# Patient Record
Sex: MALE | Race: OTHER | NOT HISPANIC OR LATINO | Employment: FULL TIME | URBAN - METROPOLITAN AREA
[De-identification: names, ages, dates, MRNs, and addresses within clinical notes are randomized per-mention and may not be internally consistent; named-entity substitution may affect disease eponyms.]

---

## 2017-02-21 RX ORDER — LOSARTAN POTASSIUM 50 MG/1
TABLET ORAL
Qty: 30 TAB | Refills: 9 | Status: SHIPPED | OUTPATIENT
Start: 2017-02-21 | End: 2017-10-17 | Stop reason: SDUPTHER

## 2017-03-13 DIAGNOSIS — I10 HTN (HYPERTENSION), BENIGN: ICD-10-CM

## 2017-03-13 RX ORDER — HYDROCHLOROTHIAZIDE 25 MG/1
25 TABLET ORAL DAILY
Qty: 30 TAB | Refills: 8 | Status: SHIPPED | OUTPATIENT
Start: 2017-03-13 | End: 2017-10-17 | Stop reason: SDUPTHER

## 2017-09-29 ENCOUNTER — HOSPITAL ENCOUNTER (OUTPATIENT)
Dept: LAB | Facility: MEDICAL CENTER | Age: 47
End: 2017-09-29
Attending: NURSE PRACTITIONER
Payer: COMMERCIAL

## 2017-09-29 DIAGNOSIS — I10 HTN (HYPERTENSION), BENIGN: ICD-10-CM

## 2017-09-29 LAB
ALBUMIN SERPL BCP-MCNC: 4.1 G/DL (ref 3.2–4.9)
ALBUMIN/GLOB SERPL: 1.4 G/DL
ALP SERPL-CCNC: 47 U/L (ref 30–99)
ALT SERPL-CCNC: 43 U/L (ref 2–50)
ANION GAP SERPL CALC-SCNC: 7 MMOL/L (ref 0–11.9)
AST SERPL-CCNC: 26 U/L (ref 12–45)
BILIRUB SERPL-MCNC: 0.4 MG/DL (ref 0.1–1.5)
BUN SERPL-MCNC: 13 MG/DL (ref 8–22)
CALCIUM SERPL-MCNC: 9.1 MG/DL (ref 8.5–10.5)
CHLORIDE SERPL-SCNC: 102 MMOL/L (ref 96–112)
CHOLEST SERPL-MCNC: 128 MG/DL (ref 100–199)
CO2 SERPL-SCNC: 28 MMOL/L (ref 20–33)
CREAT SERPL-MCNC: 1.03 MG/DL (ref 0.5–1.4)
GFR SERPL CREATININE-BSD FRML MDRD: >60 ML/MIN/1.73 M 2
GLOBULIN SER CALC-MCNC: 3 G/DL (ref 1.9–3.5)
GLUCOSE SERPL-MCNC: 84 MG/DL (ref 65–99)
HDLC SERPL-MCNC: 35 MG/DL
LDLC SERPL CALC-MCNC: 61 MG/DL
POTASSIUM SERPL-SCNC: 3.6 MMOL/L (ref 3.6–5.5)
PROT SERPL-MCNC: 7.1 G/DL (ref 6–8.2)
SODIUM SERPL-SCNC: 137 MMOL/L (ref 135–145)
TRIGL SERPL-MCNC: 158 MG/DL (ref 0–149)
TSH SERPL DL<=0.005 MIU/L-ACNC: 1.39 UIU/ML (ref 0.3–3.7)

## 2017-09-29 PROCEDURE — 36415 COLL VENOUS BLD VENIPUNCTURE: CPT

## 2017-09-29 PROCEDURE — 84443 ASSAY THYROID STIM HORMONE: CPT

## 2017-09-29 PROCEDURE — 82652 VIT D 1 25-DIHYDROXY: CPT

## 2017-09-29 PROCEDURE — 80061 LIPID PANEL: CPT

## 2017-09-29 PROCEDURE — 80053 COMPREHEN METABOLIC PANEL: CPT

## 2017-09-30 LAB — 1,25(OH)2D3 SERPL-MCNC: 36.5 PG/ML (ref 19.9–79.3)

## 2017-10-17 ENCOUNTER — OFFICE VISIT (OUTPATIENT)
Dept: MEDICAL GROUP | Facility: MEDICAL CENTER | Age: 47
End: 2017-10-17
Payer: COMMERCIAL

## 2017-10-17 VITALS
DIASTOLIC BLOOD PRESSURE: 78 MMHG | WEIGHT: 269 LBS | BODY MASS INDEX: 42.22 KG/M2 | OXYGEN SATURATION: 95 % | HEART RATE: 90 BPM | TEMPERATURE: 97.8 F | SYSTOLIC BLOOD PRESSURE: 118 MMHG | HEIGHT: 67 IN | RESPIRATION RATE: 16 BRPM

## 2017-10-17 DIAGNOSIS — I10 HTN (HYPERTENSION), BENIGN: ICD-10-CM

## 2017-10-17 DIAGNOSIS — E66.01 MORBID OBESITY WITH BMI OF 40.0-44.9, ADULT (HCC): ICD-10-CM

## 2017-10-17 DIAGNOSIS — Z23 NEED FOR TDAP VACCINATION: ICD-10-CM

## 2017-10-17 PROCEDURE — 90715 TDAP VACCINE 7 YRS/> IM: CPT | Performed by: NURSE PRACTITIONER

## 2017-10-17 PROCEDURE — 90471 IMMUNIZATION ADMIN: CPT | Performed by: NURSE PRACTITIONER

## 2017-10-17 PROCEDURE — 99213 OFFICE O/P EST LOW 20 MIN: CPT | Mod: 25 | Performed by: NURSE PRACTITIONER

## 2017-10-17 RX ORDER — HYDROCHLOROTHIAZIDE 25 MG/1
25 TABLET ORAL DAILY
Qty: 90 TAB | Refills: 3 | Status: SHIPPED | OUTPATIENT
Start: 2017-10-17 | End: 2018-11-02 | Stop reason: SDUPTHER

## 2017-10-17 RX ORDER — LOSARTAN POTASSIUM 50 MG/1
50 TABLET ORAL
Qty: 90 TAB | Refills: 3 | Status: SHIPPED | OUTPATIENT
Start: 2017-10-17 | End: 2018-10-24 | Stop reason: SDUPTHER

## 2017-10-17 RX ORDER — AMLODIPINE BESYLATE 10 MG/1
10 TABLET ORAL DAILY
Qty: 90 TAB | Refills: 3 | Status: SHIPPED | OUTPATIENT
Start: 2017-10-17 | End: 2018-11-02 | Stop reason: SDUPTHER

## 2017-10-17 ASSESSMENT — PATIENT HEALTH QUESTIONNAIRE - PHQ9: CLINICAL INTERPRETATION OF PHQ2 SCORE: 0

## 2017-10-17 NOTE — PROGRESS NOTES
"Subjective:      Jim Neves is a 47 y.o. male who presents with Annual Exam            HPI Jim NevesIs here today for yearly follow-up on hypertension.    Patient reports he feels well and has been taking his amlodipine, HCTZ, and losartan as prescribed. His blood pressure in the office today is normotensive and he believes it is good outside the office as well. His recent blood work also was good and his liver functions were no longer elevated and his potassium was low normal. Kidney functions were good. His cholesterol did show a mildly decreased HDL and mildly increased triglycerides with good LDL and total cholesterol. He admits he does not exercise and his BMI is at 42.        Social History   Substance Use Topics   • Smoking status: Never Smoker   • Smokeless tobacco: Never Used   • Alcohol use Yes      Comment: rare     Current Outpatient Prescriptions   Medication Sig Dispense Refill   • amlodipine (NORVASC) 10 MG Tab Take 1 Tab by mouth every day. 90 Tab 3   • hydrochlorothiazide (HYDRODIURIL) 25 MG Tab Take 1 Tab by mouth every day. 90 Tab 3   • losartan (COZAAR) 50 MG Tab Take 1 Tab by mouth every day. 90 Tab 3   • Cetirizine HCl 10 MG Cap Take  by mouth.     • tacrolimus (PROTOPIC) 0.1 % Ointment By derm 1 Tube 3     No current facility-administered medications for this visit.      Family History   Problem Relation Age of Onset   • Arthritis Mother    • Heart Disease Father    No past medical history on file.    Review of Systems   All other systems reviewed and are negative.         Objective:     /78   Pulse 90   Temp 36.6 °C (97.8 °F)   Resp 16   Ht 1.702 m (5' 7\")   Wt 122 kg (269 lb)   SpO2 95%   BMI 42.13 kg/m²      Physical Exam   Constitutional: He is oriented to person, place, and time. He appears well-developed and well-nourished. No distress.   HENT:   Head: Normocephalic and atraumatic.   Right Ear: External ear normal.   Left Ear: External ear normal.   Nose: Nose " normal.   Mouth/Throat: Oropharynx is clear and moist.   Eyes: Conjunctivae are normal. Right eye exhibits no discharge. Left eye exhibits no discharge.   Neck: Normal range of motion. Neck supple. No tracheal deviation present. No thyromegaly present.   Cardiovascular: Normal rate, regular rhythm and normal heart sounds.    No murmur heard.  Pulmonary/Chest: Effort normal and breath sounds normal. No respiratory distress. He has no wheezes. He has no rales.   Lymphadenopathy:     He has no cervical adenopathy.   Neurological: He is alert and oriented to person, place, and time. Coordination normal.   Skin: Skin is warm and dry. No rash noted. He is not diaphoretic. No erythema.   Psychiatric: He has a normal mood and affect. His behavior is normal. Judgment and thought content normal.   Nursing note and vitals reviewed.              Assessment/Plan:     1. HTN (hypertension), benign  Patient will continue on his 3 antihypertensive medicines and they were refilled today. I advised him the potassium rich foods because his potassium levels are low normal with his HCTZ. I advised him that he needs to lose weight and exercise or he will start having more problems as he ages. He will do his yearly blood work before his next visit.  - amlodipine (NORVASC) 10 MG Tab; Take 1 Tab by mouth every day.  Dispense: 90 Tab; Refill: 3  - hydrochlorothiazide (HYDRODIURIL) 25 MG Tab; Take 1 Tab by mouth every day.  Dispense: 90 Tab; Refill: 3  - COMP METABOLIC PANEL; Future  - LIPID PROFILE; Future  - TSH; Future  - CBC WITHOUT DIFFERENTIAL; Future    2. Need for Tdap vaccination  I have placed the below orders and discussed them with an approved delegating provider. The MA is performing the below orders under the direction of Dr. Salmeron    - Tdap =>8yo IM    3. Morbid obesity with BMI of 40.0-44.9, adult (CMS-[facility])    - Patient identified as having weight management issue.  Appropriate orders and counseling given.

## 2018-10-17 ENCOUNTER — HOSPITAL ENCOUNTER (OUTPATIENT)
Dept: LAB | Facility: MEDICAL CENTER | Age: 48
End: 2018-10-17
Attending: NURSE PRACTITIONER
Payer: COMMERCIAL

## 2018-10-17 DIAGNOSIS — I10 HTN (HYPERTENSION), BENIGN: ICD-10-CM

## 2018-10-17 LAB
ALBUMIN SERPL BCP-MCNC: 4.1 G/DL (ref 3.2–4.9)
ALBUMIN/GLOB SERPL: 1.3 G/DL
ALP SERPL-CCNC: 34 U/L (ref 30–99)
ALT SERPL-CCNC: 119 U/L (ref 2–50)
ANION GAP SERPL CALC-SCNC: 8 MMOL/L (ref 0–11.9)
AST SERPL-CCNC: 316 U/L (ref 12–45)
BILIRUB SERPL-MCNC: 0.6 MG/DL (ref 0.1–1.5)
BUN SERPL-MCNC: 20 MG/DL (ref 8–22)
CALCIUM SERPL-MCNC: 9.2 MG/DL (ref 8.5–10.5)
CHLORIDE SERPL-SCNC: 102 MMOL/L (ref 96–112)
CHOLEST SERPL-MCNC: 152 MG/DL (ref 100–199)
CO2 SERPL-SCNC: 30 MMOL/L (ref 20–33)
CREAT SERPL-MCNC: 1.23 MG/DL (ref 0.5–1.4)
FASTING STATUS PATIENT QL REPORTED: NORMAL
GLOBULIN SER CALC-MCNC: 3.2 G/DL (ref 1.9–3.5)
GLUCOSE SERPL-MCNC: 99 MG/DL (ref 65–99)
HDLC SERPL-MCNC: 37 MG/DL
LDLC SERPL CALC-MCNC: 72 MG/DL
POTASSIUM SERPL-SCNC: 3.8 MMOL/L (ref 3.6–5.5)
PROT SERPL-MCNC: 7.3 G/DL (ref 6–8.2)
SODIUM SERPL-SCNC: 140 MMOL/L (ref 135–145)
TRIGL SERPL-MCNC: 217 MG/DL (ref 0–149)
TSH SERPL DL<=0.005 MIU/L-ACNC: 1.51 UIU/ML (ref 0.38–5.33)

## 2018-10-17 PROCEDURE — 84443 ASSAY THYROID STIM HORMONE: CPT

## 2018-10-17 PROCEDURE — 80061 LIPID PANEL: CPT

## 2018-10-17 PROCEDURE — 80053 COMPREHEN METABOLIC PANEL: CPT

## 2018-10-17 PROCEDURE — 36415 COLL VENOUS BLD VENIPUNCTURE: CPT

## 2018-10-18 DIAGNOSIS — R74.01 ELEVATED TRANSAMINASE LEVEL: ICD-10-CM

## 2018-11-02 DIAGNOSIS — I10 HTN (HYPERTENSION), BENIGN: ICD-10-CM

## 2018-11-02 RX ORDER — AMLODIPINE BESYLATE 10 MG/1
TABLET ORAL
Qty: 30 TAB | Refills: 0 | Status: SHIPPED | OUTPATIENT
Start: 2018-11-02 | End: 2018-11-26 | Stop reason: SDUPTHER

## 2018-11-02 RX ORDER — HYDROCHLOROTHIAZIDE 25 MG/1
TABLET ORAL
Qty: 30 TAB | Refills: 0 | Status: SHIPPED | OUTPATIENT
Start: 2018-11-02 | End: 2018-11-26 | Stop reason: SDUPTHER

## 2018-11-26 DIAGNOSIS — I10 HTN (HYPERTENSION), BENIGN: ICD-10-CM

## 2018-11-26 RX ORDER — LOSARTAN POTASSIUM 50 MG/1
TABLET ORAL
Qty: 30 TAB | Refills: 0 | Status: SHIPPED | OUTPATIENT
Start: 2018-11-26 | End: 2018-12-12 | Stop reason: SDUPTHER

## 2018-11-26 RX ORDER — HYDROCHLOROTHIAZIDE 25 MG/1
25 TABLET ORAL
Qty: 30 TAB | Refills: 0 | Status: SHIPPED | OUTPATIENT
Start: 2018-11-26 | End: 2018-12-12 | Stop reason: SDUPTHER

## 2018-11-26 RX ORDER — AMLODIPINE BESYLATE 10 MG/1
10 TABLET ORAL
Qty: 30 TAB | Refills: 0 | Status: SHIPPED | OUTPATIENT
Start: 2018-11-26 | End: 2018-12-12 | Stop reason: SDUPTHER

## 2018-11-26 RX ORDER — LOSARTAN POTASSIUM 50 MG/1
50 TABLET ORAL
Qty: 30 TAB | Refills: 0 | Status: SHIPPED | OUTPATIENT
Start: 2018-11-26 | End: 2018-12-12

## 2018-12-07 ENCOUNTER — HOSPITAL ENCOUNTER (OUTPATIENT)
Dept: LAB | Facility: MEDICAL CENTER | Age: 48
End: 2018-12-07
Attending: NURSE PRACTITIONER
Payer: COMMERCIAL

## 2018-12-07 DIAGNOSIS — R74.01 ELEVATED TRANSAMINASE LEVEL: ICD-10-CM

## 2018-12-07 PROCEDURE — 80076 HEPATIC FUNCTION PANEL: CPT

## 2018-12-07 PROCEDURE — 36415 COLL VENOUS BLD VENIPUNCTURE: CPT

## 2018-12-07 PROCEDURE — 80074 ACUTE HEPATITIS PANEL: CPT

## 2018-12-08 LAB
ALBUMIN SERPL BCP-MCNC: 4.3 G/DL (ref 3.2–4.9)
ALP SERPL-CCNC: 41 U/L (ref 30–99)
ALT SERPL-CCNC: 43 U/L (ref 2–50)
AST SERPL-CCNC: 25 U/L (ref 12–45)
BILIRUB CONJ SERPL-MCNC: 0.1 MG/DL (ref 0.1–0.5)
BILIRUB INDIRECT SERPL-MCNC: 0.5 MG/DL (ref 0–1)
BILIRUB SERPL-MCNC: 0.6 MG/DL (ref 0.1–1.5)
HAV IGM SERPL QL IA: NEGATIVE
HBV CORE IGM SER QL: NEGATIVE
HBV SURFACE AG SER QL: NEGATIVE
HCV AB SER QL: NEGATIVE
PROT SERPL-MCNC: 7.4 G/DL (ref 6–8.2)

## 2018-12-12 ENCOUNTER — OFFICE VISIT (OUTPATIENT)
Dept: MEDICAL GROUP | Facility: MEDICAL CENTER | Age: 48
End: 2018-12-12
Payer: COMMERCIAL

## 2018-12-12 VITALS
BODY MASS INDEX: 42.22 KG/M2 | WEIGHT: 269 LBS | RESPIRATION RATE: 16 BRPM | HEART RATE: 82 BPM | SYSTOLIC BLOOD PRESSURE: 114 MMHG | HEIGHT: 67 IN | TEMPERATURE: 96.4 F | DIASTOLIC BLOOD PRESSURE: 64 MMHG | OXYGEN SATURATION: 100 %

## 2018-12-12 DIAGNOSIS — Z91.09 ENVIRONMENTAL ALLERGIES: ICD-10-CM

## 2018-12-12 DIAGNOSIS — I10 HTN (HYPERTENSION), BENIGN: ICD-10-CM

## 2018-12-12 DIAGNOSIS — R74.01 ELEVATED TRANSAMINASE LEVEL: ICD-10-CM

## 2018-12-12 DIAGNOSIS — E66.01 MORBID OBESITY WITH BMI OF 40.0-44.9, ADULT (HCC): ICD-10-CM

## 2018-12-12 DIAGNOSIS — Z00.00 ROUTINE GENERAL MEDICAL EXAMINATION AT A HEALTH CARE FACILITY: ICD-10-CM

## 2018-12-12 PROCEDURE — 99214 OFFICE O/P EST MOD 30 MIN: CPT | Performed by: NURSE PRACTITIONER

## 2018-12-12 RX ORDER — FLUTICASONE PROPIONATE 50 MCG
2 SPRAY, SUSPENSION (ML) NASAL DAILY
Qty: 16 G | Refills: 11 | Status: SHIPPED | OUTPATIENT
Start: 2018-12-12 | End: 2018-12-12 | Stop reason: SDUPTHER

## 2018-12-12 RX ORDER — AMLODIPINE BESYLATE 10 MG/1
10 TABLET ORAL
Qty: 90 TAB | Refills: 3 | Status: SHIPPED | OUTPATIENT
Start: 2018-12-12 | End: 2019-03-05 | Stop reason: SDUPTHER

## 2018-12-12 RX ORDER — LOSARTAN POTASSIUM 50 MG/1
50 TABLET ORAL
Qty: 90 TAB | Refills: 3 | Status: SHIPPED | OUTPATIENT
Start: 2018-12-12 | End: 2019-03-05 | Stop reason: SDUPTHER

## 2018-12-12 RX ORDER — HYDROCHLOROTHIAZIDE 25 MG/1
25 TABLET ORAL
Qty: 90 TAB | Refills: 3 | Status: SHIPPED | OUTPATIENT
Start: 2018-12-12 | End: 2019-03-05 | Stop reason: SDUPTHER

## 2018-12-12 RX ORDER — FLUTICASONE PROPIONATE 50 MCG
SPRAY, SUSPENSION (ML) NASAL
Qty: 3 BOTTLE | Refills: 11 | Status: SHIPPED | OUTPATIENT
Start: 2018-12-12 | End: 2020-06-21

## 2018-12-12 ASSESSMENT — ACTIVITIES OF DAILY LIVING (ADL): BATHING_REQUIRES_ASSISTANCE: 0

## 2018-12-12 ASSESSMENT — ENCOUNTER SYMPTOMS: GENERAL WELL-BEING: GOOD

## 2018-12-12 ASSESSMENT — PATIENT HEALTH QUESTIONNAIRE - PHQ9: CLINICAL INTERPRETATION OF PHQ2 SCORE: 0

## 2018-12-12 NOTE — PROGRESS NOTES
Subjective:      Jim Neves is a 48 y.o. male who presents with Annual Exam        CC: Patient here today for yearly follow-up as well as for history of elevated liver enzymes and hypertension and problem with allergies.    HPI Jim Neves        .1. Elevated transaminase level  Patient had previous lab work done in October which showed AST of 316 and ALT of 119.  He was advised to come in for follow-up on this and lab work and imaging was ordered.  He states he does have an appointment for a liver ultrasound still pending but his hepatitis panel came back negative and his hepatic function panel done just recently came back normal.  He does not know why his levels were elevated in October and he states he does not drink alcohol but is morbidly obese.  He states he did do some heavy exercise a few days prior to blood work.  He has no abdominal pain.    2. HTN (hypertension), benign  Patient needs refills on losartan, HCTZ and amlodipine which she has been using for a while for his blood pressure and it is well controlled in the office and he reports no side effects.  GFR and electrolytes have remained normal.    3. Routine general medical examination at a health care facility  Patient will need blood work done before his next visit in 1 year.    4. BMI 40.0-44.9, adult (HCC)  Patient continues to be morbidly obese and states he would be willing to talk with the dietitian for treatment.    5. Morbid obesity with BMI of 40.0-44.9, adult (HCC)  Patient morbidly obese.    6. Environmental allergies  Patient noted to be sniffling during the office visit and states he does tend to have sinus issues at different times of year.  He states his current symptoms have been present for about a month or so.  It is mostly sinus congestion and rhinorrhea.  He has had no pain in his sinuses.  He is treating with over-the-counter Zyrtec.  Current Outpatient Prescriptions   Medication Sig Dispense Refill   • losartan (COZAAR) 50  "MG Tab Take 1 Tab by mouth every day. 90 Tab 3   • hydroCHLOROthiazide (HYDRODIURIL) 25 MG Tab Take 1 Tab by mouth every day. 90 Tab 3   • amLODIPine (NORVASC) 10 MG Tab Take 1 Tab by mouth every day. 90 Tab 3   • fluticasone (FLONASE) 50 MCG/ACT nasal spray Spray 2 Sprays in nose every day. 16 g 11   • Cetirizine HCl 10 MG Cap Take  by mouth.       No current facility-administered medications for this visit.      Social History   Substance Use Topics   • Smoking status: Never Smoker   • Smokeless tobacco: Never Used   • Alcohol use Yes      Comment: rare     Family History   Problem Relation Age of Onset   • Arthritis Mother    • Heart Disease Father    History reviewed. No pertinent past medical history.    Review of Systems   HENT: Positive for congestion.    Endo/Heme/Allergies: Positive for environmental allergies.   All other systems reviewed and are negative.         Objective:     /64   Pulse 82   Temp (!) 35.8 °C (96.4 °F)   Resp 16   Ht 1.702 m (5' 7\")   Wt 122 kg (269 lb)   SpO2 100%   BMI 42.13 kg/m²      Physical Exam   Constitutional: He is oriented to person, place, and time. He appears well-developed and well-nourished. No distress.   HENT:   Head: Normocephalic and atraumatic.   Right Ear: External ear normal.   Left Ear: External ear normal.   Nose: Mucosal edema and rhinorrhea present.   Mouth/Throat: Oropharynx is clear and moist.   Eyes: Conjunctivae are normal. Right eye exhibits no discharge. Left eye exhibits no discharge.   Neck: Normal range of motion. Neck supple. No tracheal deviation present. No thyromegaly present.   Cardiovascular: Normal rate, regular rhythm and normal heart sounds.    No murmur heard.  Pulmonary/Chest: Effort normal and breath sounds normal. No respiratory distress. He has no wheezes. He has no rales.   Lymphadenopathy:     He has no cervical adenopathy.   Neurological: He is alert and oriented to person, place, and time. Coordination normal.   Skin: " Skin is warm and dry. No rash noted. He is not diaphoretic. No erythema.   Psychiatric: He has a normal mood and affect. His behavior is normal. Judgment and thought content normal.   Nursing note and vitals reviewed.              Assessment/Plan:     1. Elevated transaminase level  Patient's liver enzymes have returned to normal and I did recommend he continue his plan to have his ultrasound in case in the future they elevate again.  I advised him to avoid all hepatotoxins to prevent elevation in the future.  Fatty liver may play some part in this.  - COMP METABOLIC PANEL; Future    2. HTN (hypertension), benign  Patient doing well on his 3 medications and I will refill these today and have him do yearly blood work.  - losartan (COZAAR) 50 MG Tab; Take 1 Tab by mouth every day.  Dispense: 90 Tab; Refill: 3  - hydroCHLOROthiazide (HYDRODIURIL) 25 MG Tab; Take 1 Tab by mouth every day.  Dispense: 90 Tab; Refill: 3  - amLODIPine (NORVASC) 10 MG Tab; Take 1 Tab by mouth every day.  Dispense: 90 Tab; Refill: 3  - COMP METABOLIC PANEL; Future  - Lipid Profile; Future  - CBC WITHOUT DIFFERENTIAL; Future    3. Routine general medical examination at a health care facility  Patient will do his lab work before his yearly follow-up.  - COMP METABOLIC PANEL; Future  - Lipid Profile; Future  - CBC WITHOUT DIFFERENTIAL; Future    4. BMI 40.0-44.9, adult (HCC)  Patient will be referred to the weight management program if covered under his insurance.  - Patient identified as having weight management issue.  Appropriate orders and counseling given.  - REFERRAL TO [facility] IMPROVEMENT PROGRAMS (HIP) Services Requested: Weight Management Program; Reason for Visit: Overweight/Obesity; Future    5. Morbid obesity with BMI of 40.0-44.9, adult (HCC)  Patient would like to lose weight since it affects many aspects of his health but he is unsure if it is covered by his insurance.  - Patient identified as having weight management issue.   Appropriate orders and counseling given.  - REFERRAL TO [facility] IMPROVEMENT PROGRAMS (HIP) Services Requested: Weight Management Program; Reason for Visit: Overweight/Obesity; Future    6. Environmental allergies  I advised patient to start using Flonase nasal spray daily with his antihistamine because of the persisting sinus congestion and rhinorrhea despite use of just antihistamine.  - fluticasone (FLONASE) 50 MCG/ACT nasal spray; Spray 2 Sprays in nose every day.  Dispense: 16 g; Refill: 11

## 2018-12-16 ENCOUNTER — HOSPITAL ENCOUNTER (OUTPATIENT)
Dept: RADIOLOGY | Facility: MEDICAL CENTER | Age: 48
End: 2018-12-16
Attending: NURSE PRACTITIONER
Payer: COMMERCIAL

## 2018-12-16 DIAGNOSIS — R74.01 ELEVATED TRANSAMINASE LEVEL: ICD-10-CM

## 2018-12-16 PROCEDURE — 76705 ECHO EXAM OF ABDOMEN: CPT

## 2019-03-05 DIAGNOSIS — I10 HTN (HYPERTENSION), BENIGN: ICD-10-CM

## 2019-03-05 RX ORDER — AMLODIPINE BESYLATE 10 MG/1
10 TABLET ORAL
Qty: 90 TAB | Refills: 3 | Status: SHIPPED | OUTPATIENT
Start: 2019-03-05 | End: 2020-03-02

## 2019-03-05 RX ORDER — LOSARTAN POTASSIUM 50 MG/1
50 TABLET ORAL
Qty: 90 TAB | Refills: 3 | Status: SHIPPED | OUTPATIENT
Start: 2019-03-05 | End: 2020-03-02

## 2019-03-05 RX ORDER — HYDROCHLOROTHIAZIDE 25 MG/1
25 TABLET ORAL
Qty: 90 TAB | Refills: 3 | Status: SHIPPED | OUTPATIENT
Start: 2019-03-05 | End: 2019-06-19

## 2019-03-05 RX ORDER — HYDROCHLOROTHIAZIDE 25 MG/1
25 TABLET ORAL
Qty: 90 TAB | Refills: 3 | Status: SHIPPED | OUTPATIENT
Start: 2019-03-05 | End: 2019-03-05 | Stop reason: SDUPTHER

## 2019-06-19 ENCOUNTER — OFFICE VISIT (OUTPATIENT)
Dept: MEDICAL GROUP | Facility: MEDICAL CENTER | Age: 49
End: 2019-06-19
Payer: COMMERCIAL

## 2019-06-19 ENCOUNTER — HOSPITAL ENCOUNTER (OUTPATIENT)
Dept: LAB | Facility: MEDICAL CENTER | Age: 49
End: 2019-06-19
Attending: NURSE PRACTITIONER
Payer: COMMERCIAL

## 2019-06-19 VITALS
OXYGEN SATURATION: 96 % | BODY MASS INDEX: 40.81 KG/M2 | RESPIRATION RATE: 16 BRPM | HEIGHT: 67 IN | DIASTOLIC BLOOD PRESSURE: 72 MMHG | HEART RATE: 89 BPM | SYSTOLIC BLOOD PRESSURE: 124 MMHG | WEIGHT: 260 LBS

## 2019-06-19 DIAGNOSIS — I10 HTN (HYPERTENSION), BENIGN: ICD-10-CM

## 2019-06-19 DIAGNOSIS — Z87.39 H/O: GOUT: ICD-10-CM

## 2019-06-19 DIAGNOSIS — K76.9 LIVER LESION: ICD-10-CM

## 2019-06-19 DIAGNOSIS — M10.9 ACUTE GOUT INVOLVING TOE OF RIGHT FOOT, UNSPECIFIED CAUSE: ICD-10-CM

## 2019-06-19 DIAGNOSIS — R74.01 ELEVATED TRANSAMINASE LEVEL: ICD-10-CM

## 2019-06-19 LAB — URATE SERPL-MCNC: 8.9 MG/DL (ref 2.5–8.3)

## 2019-06-19 PROCEDURE — 84550 ASSAY OF BLOOD/URIC ACID: CPT

## 2019-06-19 PROCEDURE — 36415 COLL VENOUS BLD VENIPUNCTURE: CPT

## 2019-06-19 PROCEDURE — 99214 OFFICE O/P EST MOD 30 MIN: CPT | Performed by: NURSE PRACTITIONER

## 2019-06-19 RX ORDER — METHYLPREDNISOLONE 4 MG/1
TABLET ORAL
Qty: 21 TAB | Refills: 0 | Status: SHIPPED | OUTPATIENT
Start: 2019-06-19 | End: 2019-09-18

## 2019-06-19 RX ORDER — ALLOPURINOL 100 MG/1
100 TABLET ORAL DAILY
Qty: 30 TAB | Refills: 11 | Status: SHIPPED | OUTPATIENT
Start: 2019-06-19 | End: 2019-06-19 | Stop reason: SDUPTHER

## 2019-06-19 ASSESSMENT — PATIENT HEALTH QUESTIONNAIRE - PHQ9: CLINICAL INTERPRETATION OF PHQ2 SCORE: 0

## 2019-06-19 NOTE — PROGRESS NOTES
Subjective:      Jim Neves is a 48 y.o. male who presents with Gout        CC: Patient is here today mainly for gout concerns but also needs follow-up ultrasound and lab work.    HPI Jim Neves    1. Acute gout involving toe of right foot, unspecified cause  Patient reports he has history of gout which occurs a few times per year.  This is been going on for a while and he does not know what triggers it although he admits that it may be associated with his HCTZ.  He usually affects the joints of his feet on either side.  His current symptoms started about 2 to 3 days ago with swelling and tenderness in the right large toe at the base.  He has been using some over-the-counter medicines for treatment.  He does not know if his uric acid levels have been elevated in the past.    2. Liver lesion  Patient had liver ultrasound done in December because of elevated liver functions and the radiologist did recommend a follow-up ultrasound surveillance because of a small solid target appearing lesion in the right lobe of the liver.  Patient states he would be willing to go back for ultrasound.    3. Elevated transaminase level  Patient's previous lab work from October showed very high AST and ALT with negative testing for hepatitis and liver ultrasound showing fatty liver.  His repeat testing on a hepatic function panel was completely normal and he has not completed his labs ordered after that as of yet.    4. HTN (hypertension), benign  Blood pressure remains well controlled on his HCTZ, amlodipine, losartan combination.      Social History   Substance Use Topics   • Smoking status: Never Smoker   • Smokeless tobacco: Never Used   • Alcohol use Yes      Comment: rare     Current Outpatient Prescriptions   Medication Sig Dispense Refill   • methylPREDNISolone (MEDROL DOSEPAK) 4 MG Tablet Therapy Pack As directed on the packaging label. 21 Tab 0   • metoprolol (LOPRESSOR) 25 MG Tab Take 1 Tab by mouth 2 times a day. 60  "Tab 11   • losartan (COZAAR) 50 MG Tab Take 1 Tab by mouth every day. 90 Tab 3   • amLODIPine (NORVASC) 10 MG Tab Take 1 Tab by mouth every day. 90 Tab 3   • Cetirizine HCl 10 MG Cap Take  by mouth.     • fluticasone (FLONASE) 50 MCG/ACT nasal spray SHAKE LIQUID AND USE 2 SPRAYS IN EACH NOSTRIL EVERY DAY 3 Bottle 11     No current facility-administered medications for this visit.    History reviewed. No pertinent past medical history.   Family History   Problem Relation Age of Onset   • Arthritis Mother    • Heart Disease Father        Review of Systems   Musculoskeletal: Positive for joint pain.   All other systems reviewed and are negative.         Objective:     /72 (BP Location: Right arm, Patient Position: Sitting, BP Cuff Size: Adult)   Pulse 89   Resp 16   Ht 1.702 m (5' 7\")   Wt 117.9 kg (260 lb)   SpO2 96%   BMI 40.72 kg/m²      Physical Exam   Constitutional: He is oriented to person, place, and time. He appears well-developed and well-nourished. No distress.   HENT:   Head: Normocephalic and atraumatic.   Right Ear: External ear normal.   Left Ear: External ear normal.   Nose: Nose normal.   Mouth/Throat: Oropharynx is clear and moist.   Eyes: Conjunctivae are normal. Right eye exhibits no discharge. Left eye exhibits no discharge.   Neck: Normal range of motion. Neck supple. No tracheal deviation present. No thyromegaly present.   Cardiovascular: Normal rate, regular rhythm and normal heart sounds.    No murmur heard.  Pulmonary/Chest: Effort normal and breath sounds normal. No respiratory distress. He has no wheezes. He has no rales.   Musculoskeletal:   Tenderness at the base of the right large toe with no redness or obvious swelling.  Pulses are good and no ulceration present.   Lymphadenopathy:     He has no cervical adenopathy.   Neurological: He is alert and oriented to person, place, and time. Coordination normal.   Skin: Skin is warm and dry. No rash noted. He is not diaphoretic. No " erythema.   Psychiatric: He has a normal mood and affect. His behavior is normal. Judgment and thought content normal.   Nursing note and vitals reviewed.              Assessment/Plan:     1. Acute gout involving toe of right foot, unspecified cause  Patient gives history of gout and symptoms are consistent with possible gout so I will put him on a Medrol Dosepak.  He will go downstairs today to get a uric acid level.  He states he is not drinking alcohol except for rarely now and my concern is that his HCTZ may be making him more prone to this so we agreed to stop the medication and then I will recheck HCTZ again with his follow-up blood work in 2 months.  - URIC ACID; Future  - methylPREDNISolone (MEDROL DOSEPAK) 4 MG Tablet Therapy Pack; As directed on the packaging label.  Dispense: 21 Tab; Refill: 0    2. Liver lesion  Patient to get ultrasound to check this area and we will follow the radiologist advice.  - US-RUQ; Future    3. Elevated transaminase level  Patient reminded to do his lab work ordered in December.  - US-RUQ; Future    4. HTN (hypertension), benign  I am taking patient off HCTZ since he is reporting recurring gout and will try him on metoprolol twice a day instead.  I explained we need to watch his blood pressure and pulse rate on the new medicine.  If he is not getting adequate control I may increase his losartan.  - metoprolol (LOPRESSOR) 25 MG Tab; Take 1 Tab by mouth 2 times a day.  Dispense: 60 Tab; Refill: 11

## 2019-06-20 RX ORDER — ALLOPURINOL 100 MG/1
TABLET ORAL
Qty: 90 TAB | Refills: 3 | Status: SHIPPED | OUTPATIENT
Start: 2019-06-20 | End: 2019-08-01 | Stop reason: SDUPTHER

## 2019-06-27 DIAGNOSIS — Z87.39 H/O: GOUT: ICD-10-CM

## 2019-07-16 ENCOUNTER — HOSPITAL ENCOUNTER (OUTPATIENT)
Dept: RADIOLOGY | Facility: MEDICAL CENTER | Age: 49
End: 2019-07-16
Attending: NURSE PRACTITIONER
Payer: COMMERCIAL

## 2019-07-16 DIAGNOSIS — K76.9 LIVER LESION: ICD-10-CM

## 2019-07-16 DIAGNOSIS — R74.01 ELEVATED TRANSAMINASE LEVEL: ICD-10-CM

## 2019-07-16 PROCEDURE — 76705 ECHO EXAM OF ABDOMEN: CPT

## 2019-08-01 DIAGNOSIS — Z87.39 H/O: GOUT: ICD-10-CM

## 2019-08-01 RX ORDER — ALLOPURINOL 100 MG/1
100 TABLET ORAL
Qty: 90 TAB | Refills: 3 | Status: SHIPPED | OUTPATIENT
Start: 2019-08-01 | End: 2019-08-12 | Stop reason: SDUPTHER

## 2019-08-01 NOTE — TELEPHONE ENCOUNTER
Received fax requesting 90 day supply for allopurinol to [facility] vs local pharmacy (where rx was sent in June 2019). Pended medication with [facility].

## 2019-08-05 DIAGNOSIS — J06.9 UPPER RESPIRATORY TRACT INFECTION, UNSPECIFIED TYPE: ICD-10-CM

## 2019-08-05 RX ORDER — AZITHROMYCIN 250 MG/1
TABLET, FILM COATED ORAL
Qty: 6 TAB | Refills: 1 | Status: SHIPPED | OUTPATIENT
Start: 2019-08-05 | End: 2019-09-18

## 2019-08-12 DIAGNOSIS — Z87.39 H/O: GOUT: ICD-10-CM

## 2019-08-12 RX ORDER — ALLOPURINOL 100 MG/1
300 TABLET ORAL
Qty: 270 TAB | Refills: 3 | Status: SHIPPED | OUTPATIENT
Start: 2019-08-12 | End: 2019-09-10 | Stop reason: SDUPTHER

## 2019-09-10 DIAGNOSIS — Z87.39 H/O: GOUT: ICD-10-CM

## 2019-09-10 RX ORDER — ALLOPURINOL 100 MG/1
300 TABLET ORAL
Qty: 270 TAB | Refills: 3 | Status: SHIPPED | OUTPATIENT
Start: 2019-09-10 | End: 2019-09-12 | Stop reason: SDUPTHER

## 2019-09-12 RX ORDER — ALLOPURINOL 100 MG/1
300 TABLET ORAL
Qty: 270 TAB | Refills: 3 | Status: SHIPPED | OUTPATIENT
Start: 2019-09-12 | End: 2019-09-18 | Stop reason: SDUPTHER

## 2019-09-12 NOTE — TELEPHONE ENCOUNTER
Rx being requested to [facility] vs local pharmacy where it originally went. Med pended with pharmacy update.

## 2019-09-18 DIAGNOSIS — Z87.39 H/O: GOUT: ICD-10-CM

## 2019-09-18 RX ORDER — ALLOPURINOL 100 MG/1
300 TABLET ORAL
Qty: 270 TAB | Refills: 3 | Status: SHIPPED | OUTPATIENT
Start: 2019-09-18 | End: 2019-09-19 | Stop reason: SDUPTHER

## 2019-09-18 RX ORDER — ALLOPURINOL 100 MG/1
300 TABLET ORAL
Qty: 270 TAB | Refills: 3 | Status: SHIPPED | OUTPATIENT
Start: 2019-09-18 | End: 2019-09-18 | Stop reason: SDUPTHER

## 2019-09-18 NOTE — PROGRESS NOTES
90-day prescription for the allopurinol is sent to [facility] as requested by the patient.

## 2019-09-19 RX ORDER — ALLOPURINOL 100 MG/1
300 TABLET ORAL
Qty: 270 TAB | Refills: 3 | Status: ON HOLD | OUTPATIENT
Start: 2019-09-19 | End: 2020-07-01

## 2019-10-08 DIAGNOSIS — I10 HTN (HYPERTENSION), BENIGN: ICD-10-CM

## 2019-11-15 ENCOUNTER — HOSPITAL ENCOUNTER (OUTPATIENT)
Dept: LAB | Facility: MEDICAL CENTER | Age: 49
End: 2019-11-15
Attending: NURSE PRACTITIONER
Payer: COMMERCIAL

## 2019-11-15 DIAGNOSIS — I10 HTN (HYPERTENSION), BENIGN: ICD-10-CM

## 2019-11-15 DIAGNOSIS — M10.9 ACUTE GOUT INVOLVING TOE OF RIGHT FOOT, UNSPECIFIED CAUSE: ICD-10-CM

## 2019-11-15 DIAGNOSIS — Z00.00 ROUTINE GENERAL MEDICAL EXAMINATION AT A HEALTH CARE FACILITY: ICD-10-CM

## 2019-11-15 DIAGNOSIS — R74.01 ELEVATED TRANSAMINASE LEVEL: ICD-10-CM

## 2019-11-15 LAB
ALBUMIN SERPL BCP-MCNC: 4.2 G/DL (ref 3.2–4.9)
ALBUMIN/GLOB SERPL: 1.6 G/DL
ALP SERPL-CCNC: 56 U/L (ref 30–99)
ALT SERPL-CCNC: 35 U/L (ref 2–50)
ANION GAP SERPL CALC-SCNC: 7 MMOL/L (ref 0–11.9)
AST SERPL-CCNC: 20 U/L (ref 12–45)
BILIRUB SERPL-MCNC: 0.4 MG/DL (ref 0.1–1.5)
BUN SERPL-MCNC: 14 MG/DL (ref 8–22)
CALCIUM SERPL-MCNC: 9.1 MG/DL (ref 8.5–10.5)
CHLORIDE SERPL-SCNC: 105 MMOL/L (ref 96–112)
CHOLEST SERPL-MCNC: 139 MG/DL (ref 100–199)
CO2 SERPL-SCNC: 26 MMOL/L (ref 20–33)
CREAT SERPL-MCNC: 1.03 MG/DL (ref 0.5–1.4)
FASTING STATUS PATIENT QL REPORTED: NORMAL
GLOBULIN SER CALC-MCNC: 2.7 G/DL (ref 1.9–3.5)
GLUCOSE SERPL-MCNC: 96 MG/DL (ref 65–99)
HDLC SERPL-MCNC: 34 MG/DL
LDLC SERPL CALC-MCNC: 80 MG/DL
POTASSIUM SERPL-SCNC: 3.9 MMOL/L (ref 3.6–5.5)
PROT SERPL-MCNC: 6.9 G/DL (ref 6–8.2)
SODIUM SERPL-SCNC: 138 MMOL/L (ref 135–145)
TRIGL SERPL-MCNC: 124 MG/DL (ref 0–149)
URATE SERPL-MCNC: 4.7 MG/DL (ref 2.5–8.3)

## 2019-11-15 PROCEDURE — 80061 LIPID PANEL: CPT

## 2019-11-15 PROCEDURE — 36415 COLL VENOUS BLD VENIPUNCTURE: CPT

## 2019-11-15 PROCEDURE — 80053 COMPREHEN METABOLIC PANEL: CPT

## 2019-11-15 PROCEDURE — 84550 ASSAY OF BLOOD/URIC ACID: CPT

## 2019-11-19 ENCOUNTER — OFFICE VISIT (OUTPATIENT)
Dept: MEDICAL GROUP | Facility: MEDICAL CENTER | Age: 49
End: 2019-11-19
Payer: COMMERCIAL

## 2019-11-19 VITALS
OXYGEN SATURATION: 95 % | HEIGHT: 66 IN | SYSTOLIC BLOOD PRESSURE: 118 MMHG | WEIGHT: 266 LBS | DIASTOLIC BLOOD PRESSURE: 74 MMHG | HEART RATE: 80 BPM | BODY MASS INDEX: 42.75 KG/M2 | RESPIRATION RATE: 16 BRPM

## 2019-11-19 DIAGNOSIS — Z00.00 ROUTINE GENERAL MEDICAL EXAMINATION AT A HEALTH CARE FACILITY: ICD-10-CM

## 2019-11-19 DIAGNOSIS — Z87.39 H/O: GOUT: ICD-10-CM

## 2019-11-19 DIAGNOSIS — I10 HTN (HYPERTENSION), BENIGN: ICD-10-CM

## 2019-11-19 PROCEDURE — 99396 PREV VISIT EST AGE 40-64: CPT | Performed by: NURSE PRACTITIONER

## 2019-11-19 NOTE — PROGRESS NOTES
Subjective:      Jim Neves is a 49 y.o. male who presents with Annual Exam (reviw lab results)        CC: Patient here today for annual wellness visit.    HPI       1. 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  He reports no side effects from medicine and recent lab work is come back reassuring with lipid panel showing ASCVD score of only 2.2% and normal chemistry panel and GFR.    2. HTN (hypertension), benign  Blood pressure is controlled but patient admits today that he has been taking his metoprolol short acting only once a day by accident.  He states he does take his losartan and amlodipine correctly.    3. H/O: gout  Patient's previous uric acid levels were up to 8.9 and he states he is taking his 300 mg of allopurinol daily and subsequently his numbers improved to 4.7 and GFR continues to be normal.  History reviewed. No pertinent past medical history.  Social History     Socioeconomic History   • Marital status:      Spouse name: Not on file   • Number of children: Not on file   • Years of education: Not on file   • Highest education level: Not on file   Occupational History   • Not on file   Social Needs   • Financial resource strain: Not on file   • Food insecurity:     Worry: Not on file     Inability: Not on file   • Transportation needs:     Medical: Not on file     Non-medical: Not on file   Tobacco Use   • Smoking status: Never Smoker   • Smokeless tobacco: Never Used   Substance and Sexual Activity   • Alcohol use: Yes     Comment: rare   • Drug use: No   • Sexual activity: Yes     Partners: Female   Lifestyle   • Physical activity:     Days per week: Not on file     Minutes per session: Not on file   • Stress: Not on file   Relationships   • Social connections:     Talks on phone: Not on file     Gets together: Not on file     Attends [employer] service: Not on file     Active member of  "club or organization: Not on file     Attends meetings of clubs or organizations: Not on file     Relationship status: Not on file   • Intimate partner violence:     Fear of current or ex partner: Not on file     Emotionally abused: Not on file     Physically abused: Not on file     Forced sexual activity: Not on file   Other Topics Concern   • Not on file   Social History Narrative   • Not on file     Current Outpatient Medications   Medication Sig Dispense Refill   • metoprolol (LOPRESSOR) 25 MG Tab TAKE 1 TABLET BY MOUTH TWICE DAILY 180 Tab 2   • allopurinol (ZYLOPRIM) 100 MG Tab Take 3 Tabs by mouth every day. 270 Tab 3   • metoprolol (LOPRESSOR) 25 MG Tab TAKE 1 TABLET BY MOUTH TWICE DAILY 180 Tab 3   • losartan (COZAAR) 50 MG Tab Take 1 Tab by mouth every day. 90 Tab 3   • amLODIPine (NORVASC) 10 MG Tab Take 1 Tab by mouth every day. 90 Tab 3   • fluticasone (FLONASE) 50 MCG/ACT nasal spray SHAKE LIQUID AND USE 2 SPRAYS IN EACH NOSTRIL EVERY DAY 3 Bottle 11   • Cetirizine HCl 10 MG Cap Take  by mouth.       No current facility-administered medications for this visit.      Family History   Problem Relation Age of Onset   • Arthritis Mother    • Heart Disease Father          Review of Systems   All other systems reviewed and are negative.         Objective:     /74 (BP Location: Right arm, Patient Position: Sitting, BP Cuff Size: Adult)   Pulse 80   Resp 16   Ht 1.676 m (5' 6\")   Wt 120.7 kg (266 lb)   SpO2 95%   BMI 42.93 kg/m²      Physical Exam  Vitals signs and nursing note reviewed.   Constitutional:       General: He is not in acute distress.     Appearance: He is well-developed. He is not diaphoretic.   HENT:      Head: Normocephalic and atraumatic.      Right Ear: External ear normal.      Left Ear: External ear normal.      Nose: Nose normal.   Eyes:      General:         Right eye: No discharge.         Left eye: No discharge.      Conjunctiva/sclera: Conjunctivae normal.   Neck:      " Musculoskeletal: Normal range of motion and neck supple.      Thyroid: No thyromegaly.      Trachea: No tracheal deviation.   Cardiovascular:      Rate and Rhythm: Normal rate and regular rhythm.      Heart sounds: Normal heart sounds. No murmur.   Pulmonary:      Effort: Pulmonary effort is normal. No respiratory distress.      Breath sounds: Normal breath sounds. No wheezing or rales.   Abdominal:      General: Bowel sounds are normal. There is no distension.      Palpations: Abdomen is soft. There is no mass.      Tenderness: There is no tenderness. There is no guarding or rebound.      Hernia: No hernia is present.   Lymphadenopathy:      Cervical: No cervical adenopathy.   Skin:     General: Skin is warm and dry.      Findings: No erythema or rash.   Neurological:      Mental Status: He is alert and oriented to person, place, and time.      Coordination: Coordination normal.      Deep Tendon Reflexes:      Reflex Scores:       Patellar reflexes are 2+ on the right side and 2+ on the left side.  Psychiatric:         Behavior: Behavior normal.         Thought Content: Thought content normal.         Judgment: Judgment normal.                 Assessment/Plan:       1. Routine general medical examination at a health care facility  Patient doing well today and we reviewed his lab work which came back normal.  I did remind him he still needs to lose weight.  He will do lab work before his next visit  - Comp Metabolic Panel; Future  - URIC ACID; Future  - Lipid Profile; Future  - TSH; Future    2. HTN (hypertension), benign  Blood pressure doing well although I explained his metoprolol is short acting so does need to be used twice a day.  - Comp Metabolic Panel; Future  - URIC ACID; Future  - Lipid Profile; Future  - TSH; Future    3. H/O: gout  Uric acid levels have returned to normal with his allopurinol when he has had no side effects so he will continue this.  - URIC ACID; Future

## 2019-11-21 DIAGNOSIS — I10 HTN (HYPERTENSION), BENIGN: ICD-10-CM

## 2020-02-25 ENCOUNTER — OFFICE VISIT (OUTPATIENT)
Dept: MEDICAL GROUP | Facility: MEDICAL CENTER | Age: 50
End: 2020-02-25
Payer: COMMERCIAL

## 2020-02-25 VITALS
HEART RATE: 76 BPM | TEMPERATURE: 97.6 F | HEIGHT: 66 IN | SYSTOLIC BLOOD PRESSURE: 128 MMHG | WEIGHT: 271 LBS | BODY MASS INDEX: 43.55 KG/M2 | DIASTOLIC BLOOD PRESSURE: 72 MMHG | RESPIRATION RATE: 16 BRPM | OXYGEN SATURATION: 96 %

## 2020-02-25 DIAGNOSIS — M54.50 ACUTE LEFT-SIDED LOW BACK PAIN WITHOUT SCIATICA: ICD-10-CM

## 2020-02-25 PROCEDURE — 99214 OFFICE O/P EST MOD 30 MIN: CPT | Performed by: NURSE PRACTITIONER

## 2020-02-25 RX ORDER — MELOXICAM 15 MG/1
15 TABLET ORAL DAILY
Qty: 14 TAB | Refills: 0 | Status: SHIPPED | OUTPATIENT
Start: 2020-02-25 | End: 2020-02-26 | Stop reason: SDUPTHER

## 2020-02-25 RX ORDER — TIZANIDINE 4 MG/1
4 TABLET ORAL EVERY 6 HOURS PRN
Qty: 30 TAB | Refills: 0 | Status: SHIPPED | OUTPATIENT
Start: 2020-02-25 | End: 2020-06-21

## 2020-02-25 RX ORDER — MELOXICAM 15 MG/1
15 TABLET ORAL DAILY
Qty: 14 TAB | Refills: 0 | Status: SHIPPED | OUTPATIENT
Start: 2020-02-25 | End: 2020-02-25 | Stop reason: SDUPTHER

## 2020-02-25 ASSESSMENT — PATIENT HEALTH QUESTIONNAIRE - PHQ9: CLINICAL INTERPRETATION OF PHQ2 SCORE: 0

## 2020-02-25 ASSESSMENT — ENCOUNTER SYMPTOMS: BACK PAIN: 1

## 2020-02-25 NOTE — PROGRESS NOTES
Subjective:      Jim Neves is a 49 y.o. male who presents with Back Pain (lower back pain)        CC: Patient here today for left lower back pain.    HPI       1. Acute left-sided low back pain without sciatica  Patient comes in today because he is concerned about a possible kidney stone.  He states in 2003 he passed a kidney stone and had much pain at that time.  This time his pain is not as severe but he is still concerned about a possible stone.  He states symptoms started about 6 days ago in the left mid to lower back.  Since then the pain has lessened in severity but is still present.  He states it comes and goes and is worse with sitting to standing and certain movement.  There is no radiation of pain except occasionally slightly lower on the left side of the back but not into the legs.  There is no numbness or tingling.  He has not noticed any hematuria or change in urination.  He has had no abdominal pain.  He denies fever or chills.  History reviewed. No pertinent past medical history.  Social History     Socioeconomic History   • Marital status:      Spouse name: Not on file   • Number of children: Not on file   • Years of education: Not on file   • Highest education level: Not on file   Occupational History   • Not on file   Social Needs   • Financial resource strain: Not on file   • Food insecurity     Worry: Not on file     Inability: Not on file   • Transportation needs     Medical: Not on file     Non-medical: Not on file   Tobacco Use   • Smoking status: Never Smoker   • Smokeless tobacco: Never Used   Substance and Sexual Activity   • Alcohol use: Yes     Comment: rare   • Drug use: No   • Sexual activity: Yes     Partners: Female   Lifestyle   • Physical activity     Days per week: Not on file     Minutes per session: Not on file   • Stress: Not on file   Relationships   • Social connections     Talks on phone: Not on file     Gets together: Not on file     Attends Church service:  "Not on file     Active member of club or organization: Not on file     Attends meetings of clubs or organizations: Not on file     Relationship status: Not on file   • Intimate partner violence     Fear of current or ex partner: Not on file     Emotionally abused: Not on file     Physically abused: Not on file     Forced sexual activity: Not on file   Other Topics Concern   • Not on file   Social History Narrative   • Not on file     Current Outpatient Medications   Medication Sig Dispense Refill   • metoprolol (LOPRESSOR) 25 MG Tab TAKE 1 TABLET BY MOUTH TWICE DAILY 180 Tab 3   • allopurinol (ZYLOPRIM) 100 MG Tab Take 3 Tabs by mouth every day. 270 Tab 3   • losartan (COZAAR) 50 MG Tab Take 1 Tab by mouth every day. 90 Tab 3   • amLODIPine (NORVASC) 10 MG Tab Take 1 Tab by mouth every day. 90 Tab 3   • fluticasone (FLONASE) 50 MCG/ACT nasal spray SHAKE LIQUID AND USE 2 SPRAYS IN EACH NOSTRIL EVERY DAY 3 Bottle 11   • Cetirizine HCl 10 MG Cap Take  by mouth.       No current facility-administered medications for this visit.      Family History   Problem Relation Age of Onset   • Arthritis Mother    • Heart Disease Father          Review of Systems   Musculoskeletal: Positive for back pain.   All other systems reviewed and are negative.         Objective:     /72 (BP Location: Right arm, Patient Position: Sitting, BP Cuff Size: Adult)   Pulse 76   Temp 36.4 °C (97.6 °F) (Temporal)   Resp 16   Ht 1.676 m (5' 6\")   Wt 122.9 kg (271 lb)   SpO2 96%   BMI 43.74 kg/m²      Physical Exam  Vitals signs and nursing note reviewed.   Constitutional:       General: He is not in acute distress.     Appearance: He is well-developed. He is not diaphoretic.   HENT:      Head: Normocephalic and atraumatic.      Right Ear: External ear normal.      Left Ear: External ear normal.      Nose: Nose normal.   Eyes:      General:         Right eye: No discharge.         Left eye: No discharge.      Conjunctiva/sclera: " Conjunctivae normal.   Neck:      Musculoskeletal: Normal range of motion and neck supple.      Thyroid: No thyromegaly.      Trachea: No tracheal deviation.   Cardiovascular:      Rate and Rhythm: Normal rate and regular rhythm.      Heart sounds: Normal heart sounds. No murmur.   Pulmonary:      Effort: Pulmonary effort is normal. No respiratory distress.      Breath sounds: Normal breath sounds. No wheezing or rales.   Musculoskeletal:      Comments: Patient is pointing mostly to his left lumbar area as to where the pain is occurring but there is no CVA tenderness or lower back tenderness.  There is no radiation of pain.  Pain is elicited with bending at the waist and turning side to side.   Lymphadenopathy:      Cervical: No cervical adenopathy.   Skin:     General: Skin is warm and dry.      Findings: No erythema or rash.   Neurological:      Mental Status: He is alert and oriented to person, place, and time.      Coordination: Coordination normal.   Psychiatric:         Behavior: Behavior normal.         Thought Content: Thought content normal.         Judgment: Judgment normal.                 Assessment/Plan:     1. Acute left-sided low back pain without sciatica  Patient unfortunately was unable to provide us a urine sample despite multiple tries.  However, his symptoms sound more suspicious for musculoskeletal cause.  His pain is lower than the left flank and is precipitated with movement and bending.  I will therefore have him start on meloxicam and tizanidine.  I told him not to use the muscle relaxer when he drinks to be alert.  I did however order a CT for renal colic to do if symptoms become more like his previous renal colic symptoms from years ago or does not improve.  He is to keep well-hydrated.  He is to go to the ER should symptoms worsen or develops hematuria or fever.  - POCT Urinalysis  - CT-RENAL COLIC EVALUATION(A/P W/O); Future  - meloxicam (MOBIC) 15 MG tablet; Take 1 Tab by mouth every  day.  Dispense: 14 Tab; Refill: 0  - tizanidine (ZANAFLEX) 4 MG Tab; Take 1 Tab by mouth every 6 hours as needed.  Dispense: 30 Tab; Refill: 0

## 2020-02-26 DIAGNOSIS — M54.50 ACUTE LEFT-SIDED LOW BACK PAIN WITHOUT SCIATICA: ICD-10-CM

## 2020-02-26 RX ORDER — MELOXICAM 15 MG/1
15 TABLET ORAL DAILY
Qty: 14 TAB | Refills: 0 | Status: SHIPPED | OUTPATIENT
Start: 2020-02-26 | End: 2020-03-11 | Stop reason: SDUPTHER

## 2020-02-28 DIAGNOSIS — I10 HTN (HYPERTENSION), BENIGN: ICD-10-CM

## 2020-03-02 RX ORDER — AMLODIPINE BESYLATE 10 MG/1
TABLET ORAL
Qty: 90 TAB | Refills: 4 | Status: SHIPPED | OUTPATIENT
Start: 2020-03-02 | End: 2021-06-08 | Stop reason: SDUPTHER

## 2020-03-02 RX ORDER — LOSARTAN POTASSIUM 50 MG/1
TABLET ORAL
Qty: 90 TAB | Refills: 4 | Status: SHIPPED | OUTPATIENT
Start: 2020-03-02 | End: 2021-06-08 | Stop reason: SDUPTHER

## 2020-03-10 DIAGNOSIS — M54.50 ACUTE LEFT-SIDED LOW BACK PAIN WITHOUT SCIATICA: ICD-10-CM

## 2020-03-11 RX ORDER — MELOXICAM 15 MG/1
15 TABLET ORAL DAILY
Qty: 14 TAB | Refills: 0 | Status: SHIPPED | OUTPATIENT
Start: 2020-03-11 | End: 2020-06-21

## 2020-05-28 RX ORDER — ALBUTEROL SULFATE 90 UG/1
2 AEROSOL, METERED RESPIRATORY (INHALATION) EVERY 6 HOURS PRN
Qty: 8.5 G | Refills: 11 | Status: SHIPPED | OUTPATIENT
Start: 2020-05-28 | End: 2020-11-11

## 2020-06-20 ENCOUNTER — APPOINTMENT (OUTPATIENT)
Dept: RADIOLOGY | Facility: MEDICAL CENTER | Age: 50
DRG: 177 | End: 2020-06-20
Attending: EMERGENCY MEDICINE
Payer: COMMERCIAL

## 2020-06-20 ENCOUNTER — HOSPITAL ENCOUNTER (INPATIENT)
Facility: MEDICAL CENTER | Age: 50
LOS: 10 days | DRG: 177 | End: 2020-07-01
Attending: EMERGENCY MEDICINE | Admitting: HOSPITALIST
Payer: COMMERCIAL

## 2020-06-20 LAB
BASOPHILS # BLD AUTO: 0.3 % (ref 0–1.8)
BASOPHILS # BLD: 0.02 K/UL (ref 0–0.12)
COVID ORDER STATUS COVID19: NORMAL
EOSINOPHIL # BLD AUTO: 0.02 K/UL (ref 0–0.51)
EOSINOPHIL NFR BLD: 0.3 % (ref 0–6.9)
ERYTHROCYTE [DISTWIDTH] IN BLOOD BY AUTOMATED COUNT: 41.6 FL (ref 35.9–50)
HCT VFR BLD AUTO: 46.3 % (ref 42–52)
HGB BLD-MCNC: 15.2 G/DL (ref 14–18)
IMM GRANULOCYTES # BLD AUTO: 0.03 K/UL (ref 0–0.11)
IMM GRANULOCYTES NFR BLD AUTO: 0.5 % (ref 0–0.9)
LACTATE BLD-SCNC: 1.5 MMOL/L (ref 0.5–2)
LYMPHOCYTES # BLD AUTO: 1.52 K/UL (ref 1–4.8)
LYMPHOCYTES NFR BLD: 25.8 % (ref 22–41)
MCH RBC QN AUTO: 27.2 PG (ref 27–33)
MCHC RBC AUTO-ENTMCNC: 32.8 G/DL (ref 33.7–35.3)
MCV RBC AUTO: 83 FL (ref 81.4–97.8)
MONOCYTES # BLD AUTO: 0.5 K/UL (ref 0–0.85)
MONOCYTES NFR BLD AUTO: 8.5 % (ref 0–13.4)
NEUTROPHILS # BLD AUTO: 3.81 K/UL (ref 1.82–7.42)
NEUTROPHILS NFR BLD: 64.6 % (ref 44–72)
NRBC # BLD AUTO: 0 K/UL
NRBC BLD-RTO: 0 /100 WBC
PLATELET # BLD AUTO: 164 K/UL (ref 164–446)
PMV BLD AUTO: 9.4 FL (ref 9–12.9)
RBC # BLD AUTO: 5.58 M/UL (ref 4.7–6.1)
WBC # BLD AUTO: 5.9 K/UL (ref 4.8–10.8)

## 2020-06-20 PROCEDURE — 85652 RBC SED RATE AUTOMATED: CPT

## 2020-06-20 PROCEDURE — 83735 ASSAY OF MAGNESIUM: CPT

## 2020-06-20 PROCEDURE — C9803 HOPD COVID-19 SPEC COLLECT: HCPCS | Performed by: EMERGENCY MEDICINE

## 2020-06-20 PROCEDURE — 83605 ASSAY OF LACTIC ACID: CPT

## 2020-06-20 PROCEDURE — 94760 N-INVAS EAR/PLS OXIMETRY 1: CPT

## 2020-06-20 PROCEDURE — 84484 ASSAY OF TROPONIN QUANT: CPT

## 2020-06-20 PROCEDURE — 82728 ASSAY OF FERRITIN: CPT

## 2020-06-20 PROCEDURE — 93005 ELECTROCARDIOGRAM TRACING: CPT | Performed by: EMERGENCY MEDICINE

## 2020-06-20 PROCEDURE — 85610 PROTHROMBIN TIME: CPT

## 2020-06-20 PROCEDURE — 84100 ASSAY OF PHOSPHORUS: CPT

## 2020-06-20 PROCEDURE — 85025 COMPLETE CBC W/AUTO DIFF WBC: CPT

## 2020-06-20 PROCEDURE — 85730 THROMBOPLASTIN TIME PARTIAL: CPT

## 2020-06-20 PROCEDURE — 84145 PROCALCITONIN (PCT): CPT

## 2020-06-20 PROCEDURE — 85379 FIBRIN DEGRADATION QUANT: CPT

## 2020-06-20 PROCEDURE — 71045 X-RAY EXAM CHEST 1 VIEW: CPT

## 2020-06-20 PROCEDURE — 36415 COLL VENOUS BLD VENIPUNCTURE: CPT

## 2020-06-20 PROCEDURE — 83615 LACTATE (LD) (LDH) ENZYME: CPT

## 2020-06-20 PROCEDURE — 82550 ASSAY OF CK (CPK): CPT

## 2020-06-20 PROCEDURE — 83880 ASSAY OF NATRIURETIC PEPTIDE: CPT

## 2020-06-20 PROCEDURE — 99285 EMERGENCY DEPT VISIT HI MDM: CPT

## 2020-06-20 PROCEDURE — 83520 IMMUNOASSAY QUANT NOS NONAB: CPT

## 2020-06-20 PROCEDURE — 86140 C-REACTIVE PROTEIN: CPT

## 2020-06-20 PROCEDURE — 87040 BLOOD CULTURE FOR BACTERIA: CPT

## 2020-06-20 PROCEDURE — U0004 COV-19 TEST NON-CDC HGH THRU: HCPCS

## 2020-06-20 PROCEDURE — 80053 COMPREHEN METABOLIC PANEL: CPT

## 2020-06-20 RX ORDER — DOXYCYCLINE 100 MG/1
100 TABLET ORAL ONCE
Status: COMPLETED | OUTPATIENT
Start: 2020-06-20 | End: 2020-06-21

## 2020-06-20 RX ORDER — ALBUTEROL SULFATE 90 UG/1
2 AEROSOL, METERED RESPIRATORY (INHALATION) ONCE
Status: COMPLETED | OUTPATIENT
Start: 2020-06-20 | End: 2020-06-20

## 2020-06-20 RX ADMIN — ALBUTEROL SULFATE 2 PUFF: 90 AEROSOL, METERED RESPIRATORY (INHALATION) at 23:45

## 2020-06-20 ASSESSMENT — PAIN SCALES - WONG BAKER: WONGBAKER_NUMERICALRESPONSE: HURTS JUST A LITTLE BIT

## 2020-06-21 PROBLEM — A41.9 SEPSIS (HCC): Status: ACTIVE | Noted: 2020-06-21

## 2020-06-21 PROBLEM — J45.901 REACTIVE AIRWAY DISEASE WITH ACUTE EXACERBATION: Status: ACTIVE | Noted: 2020-06-21

## 2020-06-21 PROBLEM — J96.01 ACUTE HYPOXEMIC RESPIRATORY FAILURE (HCC): Status: ACTIVE | Noted: 2020-06-21

## 2020-06-21 PROBLEM — J12.82 PNEUMONIA DUE TO COVID-19 VIRUS: Status: ACTIVE | Noted: 2020-06-21

## 2020-06-21 PROBLEM — U07.1 PNEUMONIA DUE TO COVID-19 VIRUS: Status: ACTIVE | Noted: 2020-06-21

## 2020-06-21 PROBLEM — R79.89 ABNORMAL LFTS: Status: ACTIVE | Noted: 2020-06-21

## 2020-06-21 PROBLEM — E87.1 HYPONATREMIA: Status: ACTIVE | Noted: 2020-06-21

## 2020-06-21 LAB
ABO GROUP BLD: NORMAL
ALBUMIN SERPL BCP-MCNC: 3.3 G/DL (ref 3.2–4.9)
ALBUMIN SERPL BCP-MCNC: 3.9 G/DL (ref 3.2–4.9)
ALBUMIN/GLOB SERPL: 0.9 G/DL
ALBUMIN/GLOB SERPL: 1.1 G/DL
ALP SERPL-CCNC: 42 U/L (ref 30–99)
ALP SERPL-CCNC: 50 U/L (ref 30–99)
ALT SERPL-CCNC: 47 U/L (ref 2–50)
ALT SERPL-CCNC: 53 U/L (ref 2–50)
ANION GAP SERPL CALC-SCNC: 14 MMOL/L (ref 7–16)
ANION GAP SERPL CALC-SCNC: 16 MMOL/L (ref 7–16)
APTT PPP: 32.3 SEC (ref 24.7–36)
AST SERPL-CCNC: 49 U/L (ref 12–45)
AST SERPL-CCNC: 55 U/L (ref 12–45)
BILIRUB SERPL-MCNC: 0.2 MG/DL (ref 0.1–1.5)
BILIRUB SERPL-MCNC: 0.4 MG/DL (ref 0.1–1.5)
BUN SERPL-MCNC: 17 MG/DL (ref 8–22)
BUN SERPL-MCNC: 20 MG/DL (ref 8–22)
CALCIUM SERPL-MCNC: 8.2 MG/DL (ref 8.5–10.5)
CALCIUM SERPL-MCNC: 8.4 MG/DL (ref 8.5–10.5)
CHLORIDE SERPL-SCNC: 98 MMOL/L (ref 96–112)
CHLORIDE SERPL-SCNC: 99 MMOL/L (ref 96–112)
CK SERPL-CCNC: 556 U/L (ref 0–154)
CO2 SERPL-SCNC: 16 MMOL/L (ref 20–33)
CO2 SERPL-SCNC: 20 MMOL/L (ref 20–33)
CREAT SERPL-MCNC: 0.99 MG/DL (ref 0.5–1.4)
CREAT SERPL-MCNC: 1.26 MG/DL (ref 0.5–1.4)
CRP SERPL HS-MCNC: 4.14 MG/DL (ref 0–0.75)
D DIMER PPP IA.FEU-MCNC: 0.75 UG/ML (FEU) (ref 0–0.5)
EKG IMPRESSION: NORMAL
ERYTHROCYTE [SEDIMENTATION RATE] IN BLOOD BY WESTERGREN METHOD: 20 MM/HOUR (ref 0–15)
FERRITIN SERPL-MCNC: 3152 NG/ML (ref 22–322)
GLOBULIN SER CALC-MCNC: 3.5 G/DL (ref 1.9–3.5)
GLOBULIN SER CALC-MCNC: 3.7 G/DL (ref 1.9–3.5)
GLUCOSE SERPL-MCNC: 109 MG/DL (ref 65–99)
GLUCOSE SERPL-MCNC: 110 MG/DL (ref 65–99)
INR PPP: 0.88 (ref 0.87–1.13)
LACTATE BLD-SCNC: 1.4 MMOL/L (ref 0.5–2)
LACTATE BLD-SCNC: 1.5 MMOL/L (ref 0.5–2)
LDH SERPL L TO P-CCNC: 370 U/L (ref 107–266)
MAGNESIUM SERPL-MCNC: 2 MG/DL (ref 1.5–2.5)
NT-PROBNP SERPL IA-MCNC: 13 PG/ML (ref 0–125)
PHOSPHATE SERPL-MCNC: 4.1 MG/DL (ref 2.5–4.5)
POTASSIUM SERPL-SCNC: 3.9 MMOL/L (ref 3.6–5.5)
POTASSIUM SERPL-SCNC: 4.1 MMOL/L (ref 3.6–5.5)
PROCALCITONIN SERPL-MCNC: 0.41 NG/ML
PROT SERPL-MCNC: 7 G/DL (ref 6–8.2)
PROT SERPL-MCNC: 7.4 G/DL (ref 6–8.2)
PROTHROMBIN TIME: 12.2 SEC (ref 12–14.6)
RH BLD: NORMAL
SARS-COV-2 RNA RESP QL NAA+PROBE: DETECTED
SODIUM SERPL-SCNC: 129 MMOL/L (ref 135–145)
SODIUM SERPL-SCNC: 134 MMOL/L (ref 135–145)
SPECIMEN SOURCE: ABNORMAL
TROPONIN T SERPL-MCNC: 14 NG/L (ref 6–19)

## 2020-06-21 PROCEDURE — 700105 HCHG RX REV CODE 258: Performed by: INTERNAL MEDICINE

## 2020-06-21 PROCEDURE — 80053 COMPREHEN METABOLIC PANEL: CPT

## 2020-06-21 PROCEDURE — 700102 HCHG RX REV CODE 250 W/ 637 OVERRIDE(OP): Performed by: HOSPITALIST

## 2020-06-21 PROCEDURE — 700105 HCHG RX REV CODE 258: Performed by: EMERGENCY MEDICINE

## 2020-06-21 PROCEDURE — A9270 NON-COVERED ITEM OR SERVICE: HCPCS | Performed by: HOSPITALIST

## 2020-06-21 PROCEDURE — 86900 BLOOD TYPING SEROLOGIC ABO: CPT

## 2020-06-21 PROCEDURE — 94640 AIRWAY INHALATION TREATMENT: CPT

## 2020-06-21 PROCEDURE — 700111 HCHG RX REV CODE 636 W/ 250 OVERRIDE (IP): Performed by: EMERGENCY MEDICINE

## 2020-06-21 PROCEDURE — A9270 NON-COVERED ITEM OR SERVICE: HCPCS | Performed by: EMERGENCY MEDICINE

## 2020-06-21 PROCEDURE — 700102 HCHG RX REV CODE 250 W/ 637 OVERRIDE(OP): Performed by: INTERNAL MEDICINE

## 2020-06-21 PROCEDURE — 36430 TRANSFUSION BLD/BLD COMPNT: CPT

## 2020-06-21 PROCEDURE — 86901 BLOOD TYPING SEROLOGIC RH(D): CPT

## 2020-06-21 PROCEDURE — P9017 PLASMA 1 DONOR FRZ W/IN 8 HR: HCPCS

## 2020-06-21 PROCEDURE — 36415 COLL VENOUS BLD VENIPUNCTURE: CPT

## 2020-06-21 PROCEDURE — 770021 HCHG ROOM/CARE - ISO PRIVATE

## 2020-06-21 PROCEDURE — 700105 HCHG RX REV CODE 258: Performed by: HOSPITALIST

## 2020-06-21 PROCEDURE — 99255 IP/OBS CONSLTJ NEW/EST HI 80: CPT | Mod: CS | Performed by: INTERNAL MEDICINE

## 2020-06-21 PROCEDURE — 99223 1ST HOSP IP/OBS HIGH 75: CPT | Mod: CS | Performed by: HOSPITALIST

## 2020-06-21 PROCEDURE — 96365 THER/PROPH/DIAG IV INF INIT: CPT

## 2020-06-21 PROCEDURE — 94760 N-INVAS EAR/PLS OXIMETRY 1: CPT

## 2020-06-21 PROCEDURE — 700111 HCHG RX REV CODE 636 W/ 250 OVERRIDE (IP): Performed by: HOSPITALIST

## 2020-06-21 PROCEDURE — A9270 NON-COVERED ITEM OR SERVICE: HCPCS | Performed by: INTERNAL MEDICINE

## 2020-06-21 PROCEDURE — 83605 ASSAY OF LACTIC ACID: CPT

## 2020-06-21 PROCEDURE — 700102 HCHG RX REV CODE 250 W/ 637 OVERRIDE(OP): Performed by: EMERGENCY MEDICINE

## 2020-06-21 PROCEDURE — 99233 SBSQ HOSP IP/OBS HIGH 50: CPT | Mod: CS | Performed by: HOSPITALIST

## 2020-06-21 RX ORDER — AMLODIPINE BESYLATE 5 MG/1
10 TABLET ORAL
Status: DISCONTINUED | OUTPATIENT
Start: 2020-06-21 | End: 2020-07-01 | Stop reason: HOSPADM

## 2020-06-21 RX ORDER — ONDANSETRON 2 MG/ML
4 INJECTION INTRAMUSCULAR; INTRAVENOUS EVERY 6 HOURS PRN
Status: DISCONTINUED | OUTPATIENT
Start: 2020-06-21 | End: 2020-07-01 | Stop reason: HOSPADM

## 2020-06-21 RX ORDER — LOSARTAN POTASSIUM 50 MG/1
50 TABLET ORAL
Status: DISCONTINUED | OUTPATIENT
Start: 2020-06-21 | End: 2020-07-01 | Stop reason: HOSPADM

## 2020-06-21 RX ORDER — AZITHROMYCIN 500 MG/5ML
500 INJECTION, POWDER, LYOPHILIZED, FOR SOLUTION INTRAVENOUS EVERY 24 HOURS
Status: DISCONTINUED | OUTPATIENT
Start: 2020-06-21 | End: 2020-06-21

## 2020-06-21 RX ORDER — ONDANSETRON 4 MG/1
4 TABLET, ORALLY DISINTEGRATING ORAL EVERY 6 HOURS PRN
Status: DISCONTINUED | OUTPATIENT
Start: 2020-06-21 | End: 2020-07-01 | Stop reason: HOSPADM

## 2020-06-21 RX ORDER — ALBUTEROL SULFATE 90 UG/1
2 AEROSOL, METERED RESPIRATORY (INHALATION)
Status: DISCONTINUED | OUTPATIENT
Start: 2020-06-21 | End: 2020-06-25

## 2020-06-21 RX ORDER — FLUTICASONE PROPIONATE 50 MCG
1 SPRAY, SUSPENSION (ML) NASAL
COMMUNITY

## 2020-06-21 RX ORDER — AZITHROMYCIN 250 MG/1
500 TABLET, FILM COATED ORAL DAILY
Status: COMPLETED | OUTPATIENT
Start: 2020-06-21 | End: 2020-06-23

## 2020-06-21 RX ORDER — SODIUM CHLORIDE 9 MG/ML
INJECTION, SOLUTION INTRAVENOUS
Status: ACTIVE
Start: 2020-06-21 | End: 2020-06-22

## 2020-06-21 RX ORDER — ACETAMINOPHEN 325 MG/1
650 TABLET ORAL EVERY 4 HOURS PRN
Status: DISCONTINUED | OUTPATIENT
Start: 2020-06-21 | End: 2020-07-01 | Stop reason: HOSPADM

## 2020-06-21 RX ADMIN — CEFTRIAXONE SODIUM 2000 MG: 2 INJECTION, POWDER, FOR SOLUTION INTRAMUSCULAR; INTRAVENOUS at 17:20

## 2020-06-21 RX ADMIN — AZITHROMYCIN MONOHYDRATE 500 MG: 250 TABLET ORAL at 02:02

## 2020-06-21 RX ADMIN — DOXYCYCLINE 100 MG: 100 TABLET, FILM COATED ORAL at 00:28

## 2020-06-21 RX ADMIN — REMDESIVIR 200 MG: 5 INJECTION INTRAVENOUS at 12:26

## 2020-06-21 RX ADMIN — ACETAMINOPHEN 650 MG: 325 TABLET, FILM COATED ORAL at 15:54

## 2020-06-21 RX ADMIN — METOPROLOL TARTRATE 25 MG: 25 TABLET, FILM COATED ORAL at 17:20

## 2020-06-21 RX ADMIN — METOPROLOL TARTRATE 25 MG: 25 TABLET, FILM COATED ORAL at 08:06

## 2020-06-21 RX ADMIN — CEFTRIAXONE SODIUM 2 G: 2 INJECTION, POWDER, FOR SOLUTION INTRAMUSCULAR; INTRAVENOUS at 00:31

## 2020-06-21 RX ADMIN — ACETAMINOPHEN 650 MG: 325 TABLET, FILM COATED ORAL at 20:52

## 2020-06-21 RX ADMIN — ACETAMINOPHEN 650 MG: 325 TABLET, FILM COATED ORAL at 03:00

## 2020-06-21 RX ADMIN — ENOXAPARIN SODIUM 40 MG: 100 INJECTION SUBCUTANEOUS at 05:47

## 2020-06-21 RX ADMIN — ALBUTEROL SULFATE 2 PUFF: 90 AEROSOL, METERED RESPIRATORY (INHALATION) at 14:34

## 2020-06-21 RX ADMIN — ALBUTEROL SULFATE 2 PUFF: 90 AEROSOL, METERED RESPIRATORY (INHALATION) at 20:24

## 2020-06-21 ASSESSMENT — ENCOUNTER SYMPTOMS
FLANK PAIN: 0
HEMOPTYSIS: 0
SENSORY CHANGE: 0
SPEECH CHANGE: 0
BRUISES/BLEEDS EASILY: 0
NAUSEA: 0
WEAKNESS: 0
DIZZINESS: 0
CHILLS: 1
DIARRHEA: 0
PALPITATIONS: 0
EYE DISCHARGE: 0
SHORTNESS OF BREATH: 1
DOUBLE VISION: 0
HEARTBURN: 0
MYALGIAS: 0
BLURRED VISION: 0
SPUTUM PRODUCTION: 1
FOCAL WEAKNESS: 0
MYALGIAS: 1
SORE THROAT: 0
HEADACHES: 0
FEVER: 1
VOMITING: 0
HALLUCINATIONS: 0
ABDOMINAL PAIN: 0
FEVER: 0
BACK PAIN: 0
LOSS OF CONSCIOUSNESS: 0
CHILLS: 0
COUGH: 1
DEPRESSION: 0
SPUTUM PRODUCTION: 0

## 2020-06-21 ASSESSMENT — LIFESTYLE VARIABLES
ALCOHOL_USE: YES
TOTAL SCORE: 0
ON A TYPICAL DAY WHEN YOU DRINK ALCOHOL HOW MANY DRINKS DO YOU HAVE: 0
TOTAL SCORE: 0
CONSUMPTION TOTAL: NEGATIVE
HAVE YOU EVER FELT YOU SHOULD CUT DOWN ON YOUR DRINKING: NO
DOES PATIENT WANT TO STOP DRINKING: NO
AVERAGE NUMBER OF DAYS PER WEEK YOU HAVE A DRINK CONTAINING ALCOHOL: 0
EVER HAD A DRINK FIRST THING IN THE MORNING TO STEADY YOUR NERVES TO GET RID OF A HANGOVER: NO
TOTAL SCORE: 0
HOW MANY TIMES IN THE PAST YEAR HAVE YOU HAD 5 OR MORE DRINKS IN A DAY: 0
SUBSTANCE_ABUSE: 0
EVER FELT BAD OR GUILTY ABOUT YOUR DRINKING: NO
HAVE PEOPLE ANNOYED YOU BY CRITICIZING YOUR DRINKING: NO

## 2020-06-21 ASSESSMENT — COGNITIVE AND FUNCTIONAL STATUS - GENERAL
SUGGESTED CMS G CODE MODIFIER MOBILITY: CH
DAILY ACTIVITIY SCORE: 24
SUGGESTED CMS G CODE MODIFIER DAILY ACTIVITY: CH
MOBILITY SCORE: 24

## 2020-06-21 ASSESSMENT — FIBROSIS 4 INDEX: FIB4 SCORE: 2.17

## 2020-06-21 ASSESSMENT — PATIENT HEALTH QUESTIONNAIRE - PHQ9
1. LITTLE INTEREST OR PLEASURE IN DOING THINGS: NOT AT ALL
SUM OF ALL RESPONSES TO PHQ9 QUESTIONS 1 AND 2: 0
SUM OF ALL RESPONSES TO PHQ9 QUESTIONS 1 AND 2: 0
2. FEELING DOWN, DEPRESSED, IRRITABLE, OR HOPELESS: NOT AT ALL
1. LITTLE INTEREST OR PLEASURE IN DOING THINGS: NOT AT ALL
2. FEELING DOWN, DEPRESSED, IRRITABLE, OR HOPELESS: NOT AT ALL

## 2020-06-21 NOTE — PROGRESS NOTES
Monitor summary: SR 90-92, AZ 0.20, QRS 0.08, QT 0.40, with no ectopy per strip from monitor room.

## 2020-06-21 NOTE — ASSESSMENT & PLAN NOTE
DC tele  Wean 02 as tolerated  Cont with empiric IV abx   Follow up covid 19 labs ordered   Blood cx neg  Remdesivir D2  S/p convalescent plasma 6/21  ID following   Clinically is improved    6/23 DDimer>1: increaseing crp, change to full dose anticoagulation    6/24 increasing sob with tachypnea, on 4L face mask sat 90%  ABG 7.41/ PO2 20  pulm consulted, Dr. Bautista, appreciate input  - CT PE ordered    6/25 sob improving, now neutropenic  Improving CRP and Ddimer  -LDH elevated, monitor  On decadron per pulm x 10 days or dc  - repeat convalescent serum, ID to assist  - on Remdesivir    Per pulm, ABG, PO2 reviewed    6/26 monitor CRP, d-dimer and LDH to 48 hours  Respiratory symptoms improving, intermittently requiring 4 to 6 L oxygen supplementation  Clinically improving  Monitor closely  Encourage I-S use  Status post plasma x2, completed Remdesivir on June 25 6/27 decreasing CRP, d-dimer, CPK and LDH  Still requiring 6 L oxygen supplementation, continue prone positioning    6/28 improving, 4L    6/29 improving inflammatory markers, mild leukocytosis today  No new signs of infection  Encourage I-S use  On room air    6/30 leukocytosis, follow-up CBC in a.m.

## 2020-06-21 NOTE — CARE PLAN
Problem: Communication  Goal: The ability to communicate needs accurately and effectively will improve  Outcome: PROGRESSING AS EXPECTED     Problem: Safety  Goal: Will remain free from injury  Outcome: PROGRESSING AS EXPECTED     Problem: Pain Management  Goal: Pain level will decrease to patient's comfort goal  Outcome: PROGRESSING AS EXPECTED     Problem: Respiratory:  Goal: Respiratory status will improve  Outcome: PROGRESSING SLOWER THAN EXPECTED

## 2020-06-21 NOTE — H&P
Hospital Medicine History & Physical Note    Date of Service  6/21/2020    Primary Care Physician  LC Miller    Code Status  Full Code    Chief Complaint  Chief Complaint   Patient presents with   • Shortness of Breath   • Fever     PT reported that after returning from trip to [place] that he has had a fever and SOB without relief from OTC meds.  PT also with Covid test done on 6/15       History of Presenting Illness  49 y.o. male who presented 6/20/2020 with past medical history of hypertension who presents with cough and shortness of breath.  This patient recently had COVID-19 test done on 6/15/20 which was negative.  He does however have sick contacts in his family members.  Who have tested positive for COVID-19.  He has had worsening shortness of breath and cough.  No relief with over-the-counter medications.  Progressively worsening over the last 5 days.  Otherwise no known alleviating or exacerbating factors to his symptoms.  In the emergency department is tested positive for COVID-19 will be admitted to the hospital for further management.    Review of Systems  Review of Systems   Constitutional: Positive for chills, fever and malaise/fatigue.   HENT: Negative for congestion, hearing loss and tinnitus.    Eyes: Negative for blurred vision, double vision and discharge.   Respiratory: Positive for cough, sputum production and shortness of breath. Negative for hemoptysis.    Cardiovascular: Negative for chest pain, palpitations and leg swelling.   Gastrointestinal: Negative for abdominal pain, heartburn, nausea and vomiting.   Genitourinary: Negative for dysuria and flank pain.   Musculoskeletal: Negative for joint pain and myalgias.   Skin: Negative for rash.   Neurological: Negative for dizziness, sensory change, speech change, focal weakness and weakness.   Endo/Heme/Allergies: Negative for environmental allergies. Does not bruise/bleed easily.   Psychiatric/Behavioral: Negative for  depression, hallucinations and substance abuse.       Past Medical History  Obesity, htn    Surgical History  Reviewed and not pertinent    Family History  family history includes Arthritis in his mother; Heart Disease in his father.     Social History   reports that he has never smoked. He has never used smokeless tobacco. He reports current alcohol use. He reports that he does not use drugs.    Allergies  No Known Allergies    Medications  Prior to Admission Medications   Prescriptions Last Dose Informant Patient Reported? Taking?   Cetirizine HCl 10 MG Cap 6/20/2020 at AM Family Member Yes No   Sig: Take 10 mg by mouth every day.   albuterol 108 (90 Base) MCG/ACT Aero Soln inhalation aerosol PRN at PRN Family Member No No   Sig: Inhale 2 Puffs by mouth every 6 hours as needed.   allopurinol (ZYLOPRIM) 100 MG Tab 6/20/2020 at AM Family Member No No   Sig: Take 3 Tabs by mouth every day.   amLODIPine (NORVASC) 10 MG Tab 6/20/2020 at AM Family Member No No   Sig: TAKE 1 TABLET DAILY   fluticasone (FLONASE) 50 MCG/ACT nasal spray PRN at PRN Family Member Yes Yes   Sig: Spray 1 Spray in nose.   losartan (COZAAR) 50 MG Tab 6/20/2020 at AM Family Member No No   Sig: TAKE 1 TABLET DAILY   metoprolol (LOPRESSOR) 25 MG Tab 6/21/2020 at AM Family Member No No   Sig: TAKE 1 TABLET BY MOUTH TWICE DAILY      Facility-Administered Medications: None       Physical Exam  Temp:  [39 °C (102.2 °F)] 39 °C (102.2 °F)  Pulse:  [84] 84  Resp:  [20] 20  BP: (118)/(72) 118/72  SpO2:  [87 %] 87 %    Physical Exam  Vitals signs reviewed.   Constitutional:       General: He is not in acute distress.     Appearance: He is ill-appearing.   HENT:      Head: Normocephalic and atraumatic.      Nose: No congestion.      Mouth/Throat:      Mouth: Mucous membranes are dry.   Eyes:      Extraocular Movements: Extraocular movements intact.      Pupils: Pupils are equal, round, and reactive to light.   Neck:      Musculoskeletal: Neck supple.    Cardiovascular:      Rate and Rhythm: Normal rate and regular rhythm.      Pulses: Normal pulses.      Heart sounds: Normal heart sounds.   Pulmonary:      Effort: Pulmonary effort is normal. No respiratory distress.      Breath sounds: Rhonchi present.   Abdominal:      General: Bowel sounds are normal. There is no distension.      Palpations: Abdomen is soft.      Tenderness: There is no abdominal tenderness.   Musculoskeletal:         General: No swelling.   Skin:     General: Skin is warm and dry.      Capillary Refill: Capillary refill takes 2 to 3 seconds.   Neurological:      General: No focal deficit present.      Mental Status: He is alert and oriented to person, place, and time. Mental status is at baseline.   Psychiatric:         Mood and Affect: Mood normal.         Behavior: Behavior normal.         Thought Content: Thought content normal.         Judgment: Judgment normal.         Laboratory:  Recent Labs     06/20/20  2321   WBC 5.9   RBC 5.58   HEMOGLOBIN 15.2   HEMATOCRIT 46.3   MCV 83.0   MCH 27.2   MCHC 32.8*   RDW 41.6   PLATELETCT 164   MPV 9.4     Recent Labs     06/20/20  2321   SODIUM 134*   POTASSIUM 3.9   CHLORIDE 98   CO2 20   GLUCOSE 110*   BUN 20   CREATININE 1.26   CALCIUM 8.4*     Recent Labs     06/20/20  2321   ALTSGPT 53*   ASTSGOT 55*   ALKPHOSPHAT 50   TBILIRUBIN 0.4   GLUCOSE 110*     Recent Labs     06/20/20  2321   APTT 32.3   INR 0.88     Recent Labs     06/20/20  2321   NTPROBNP 13         Recent Labs     06/20/20  2321   TROPONINT 14       Imaging:  DX-CHEST-PORTABLE (1 VIEW)   Final Result         Patchy bilateral lower lung opacities, atelectasis or multifocal pneumonia.            Assessment/Plan:  Anticipate greater than 2 midnight hospital stay    * Pneumonia due to COVID-19 virus  Assessment & Plan  Admit to telemetry   Wean 02 as tolerated  Cont with empiric IV abx   Follow up covid 19 labs ordered   Prn tylenol ordered  Contact precautions       Abnormal  LFTs  Assessment & Plan  Mild, likely due to covid   Cont ot trend    Hyponatremia  Assessment & Plan  Mild, cont to trend    Acute hypoxemic respiratory failure (HCC)  Assessment & Plan  Wean 02 as tolerated    BMI 40.0-44.9, adult (HCC)- (present on admission)  Assessment & Plan  Encourage weight loss    HTN (hypertension), benign- (present on admission)  Assessment & Plan  Resume home CCB, BB and arb     Ppx: lovenox

## 2020-06-21 NOTE — PROGRESS NOTES
PRIOR TO administering remdesivir, I informed the patient or parent/caregiver of the following information:  ·        I provided them the  “Fact Sheet for Patients and Parents/Caregivers”  ·        I informed them of therapeutic alternatives to remdesivir and the risks and benefits of those alternatives  ·        I informed them that they have the option to accept or refuse remdesivir  ·        I informed them that remdesivir is not FDA approved, but that it is authorized for use under emergency by the FDA  ·        I informed them of the known risks and benefits of remdesivir and discussed the extent to which such risks and benefits are unknown  All information provided and communicated was consistent with the “Fact Sheet for Patients and Parents/Caregivers”.

## 2020-06-21 NOTE — CONSULTS
INFECTIOUS DISEASES INPATIENT CONSULT NOTE     Date of Service: 6/21/2020    Consult Requested By: Martin Mata D.O.    Reason for Consultation: COVID-19 pneumonia    History of Present Illness:   Jim Neves is a 49 y.o. man with a history of seasonal allergies and hypertension admitted 6/20/2020 for worsening shortness of breath and hypoxia.  Patient recently traveled with family to [place] over a week ago.  He was in his usual state of health until approximately 1 week ago when he started to develop fevers and myalgias.  Over the course of this past week, his fevers persisted and he developed a worsening shortness of breath with a nonproductive cough.  His wife purchased a pulse oximeter and he was noted to be satting 88% on room air.  Denies any loss of smell or taste.  His family members who are also in [place] have been ill with similar symptoms and several of his family members have tested positive for COVID-19 and are hospitalized.  Given his worsening symptoms, he presented to the ED for further evaluation and management.  On presentation, he was febrile to 102.2 and a normal white count.  Chest x-ray showed patchy bilateral lower lung opacities.  COVID-19 testing was positive.  He was also found to have an elevated inflammatory markers.  Hypoxic on room air and placed on 4 L nasal cannula.  This morning his oxygen requirements have increased to 5.  He continues to have significant labored breathing exacerbated with minimal exertion.  Patient was started on antibiotics given an elevated procalcitonin.  Infectious disease service consulted for remdesivir and convalescent plasma.      Review of Systems   Constitutional: Positive for fever and malaise/fatigue.   Respiratory: Positive for cough and shortness of breath. Negative for hemoptysis.    Cardiovascular: Negative for chest pain.   Gastrointestinal: Negative for abdominal pain, diarrhea, nausea and vomiting.   Genitourinary:  Negative for dysuria.   Musculoskeletal: Positive for myalgias.   Neurological: Negative for dizziness and headaches.   All other systems reviewed and are negative.      PMH:   Seasonal allergies  Hypertension    PSH:  Lasik surgery    FAMILY HX:  Family History   Problem Relation Age of Onset   • Arthritis Mother    • Heart Disease Father        SOCIAL HX:  Social History     Socioeconomic History   • Marital status:      Spouse name: Not on file   • Number of children: Not on file   • Years of education: Not on file   • Highest education level: Not on file   Occupational History   • Not on file   Social Needs   • Financial resource strain: Not on file   • Food insecurity     Worry: Not on file     Inability: Not on file   • Transportation needs     Medical: Not on file     Non-medical: Not on file   Tobacco Use   • Smoking status: Never Smoker   • Smokeless tobacco: Never Used   Substance and Sexual Activity   • Alcohol use: Yes     Comment: rare   • Drug use: No   • Sexual activity: Yes     Partners: Female   Lifestyle   • Physical activity     Days per week: Not on file     Minutes per session: Not on file   • Stress: Not on file   Relationships   • Social connections     Talks on phone: Not on file     Gets together: Not on file     Attends [employer] service: Not on file     Active member of club or organization: Not on file     Attends meetings of clubs or organizations: Not on file     Relationship status: Not on file   • Intimate partner violence     Fear of current or ex partner: Not on file     Emotionally abused: Not on file     Physically abused: Not on file     Forced sexual activity: Not on file   Other Topics Concern   • Not on file   Social History Narrative   • Not on file     Social History     Tobacco Use   Smoking Status Never Smoker   Smokeless Tobacco Never Used     Social History     Substance and Sexual Activity   Alcohol Use Yes    Comment: rare       Allergies/Intolerances:  No Known  "Allergies    History reviewed with the patient     Other Current Medications:    Current Facility-Administered Medications:   •  ondansetron (ZOFRAN) syringe/vial injection 4 mg, 4 mg, Intravenous, Q6HRS PRN, Murali Ohara M.D.  •  ondansetron (ZOFRAN ODT) dispertab 4 mg, 4 mg, Oral, Q6HRS PRN, Murali Ohara M.D.  •  enoxaparin (LOVENOX) inj 40 mg, 40 mg, Subcutaneous, DAILY, Murali Ohara M.D., 40 mg at 20 0547  •  cefTRIAXone (ROCEPHIN) 2,000 mg in  mL IVPB, 2,000 mg, Intravenous, Q24HRS, Murali Ohara M.D.  •  amLODIPine (NORVASC) tablet 10 mg, 10 mg, Oral, Q DAY, Martin Mata D.O., Stopped at 20 0800  •  losartan (COZAAR) tablet 50 mg, 50 mg, Oral, Q DAY, Martin Mata D.O., Stopped at 20 0800  •  metoprolol (LOPRESSOR) tablet 25 mg, 25 mg, Oral, TWICE DAILY, Martin Mata D.O., 25 mg at 20 0806  •  acetaminophen (TYLENOL) tablet 650 mg, 650 mg, Oral, Q4HRS PRN, Murali Ohara M.D., 650 mg at 20 0300  •  azithromycin (ZITHROMAX) tablet 500 mg, 500 mg, Oral, DAILY, Murali Ohara M.D., 500 mg at 20 0202  •  albuterol inhaler 2 Puff, 2 Puff, Inhalation, Q4HRS (RT), Martin Mata D.O.  [unfilled]    Most Recent Vital Signs:  /67   Pulse 95   Temp 37.6 °C (99.7 °F) (Temporal)   Resp 20   Ht 1.676 m (5' 6\")   Wt 122.2 kg (269 lb 8 oz)   SpO2 90%   BMI 43.50 kg/m²   Temp  Av.7 °C (99.9 °F)  Min: 36.8 °C (98.2 °F)  Max: 39 °C (102.2 °F)    Physical Exam:  General: well nourished, diaphoretic, ill-appearing, no acute distress, obese  HEENT: sclera anicteric, PERRL, extraocular muscles intact, mucous membranes moist, oropharynx clear and moist, no oral lesions or exudate  Neck: supple, no lymphadenopathy  Chest: Coarse breath sounds bilaterally, no rales, rhonchi or wheezes, slightly tachypneic with conversation  Cardiac: Tachycardic rate and rhythm, normal S1 S2, no murmurs, rubs or " "gallops  Abdomen: + bowel sounds, soft, non-tender, non-distended, no hepatosplenomegaly  Extremities: WWP, no edema, 2+ pedal pulses  Skin: warm and dry, no rashes or worrisome lesions  Neuro: Alert and oriented times 3, non-focal exam, speech fluent, full range of motion to bilateral upper and lower extremities  Psych: normal mood and behavior, pleasant; memory intact, normal judgement    Pertinent Lab Results:  Recent Labs     06/20/20 2321   WBC 5.9      Recent Labs     06/20/20 2321   HEMOGLOBIN 15.2   HEMATOCRIT 46.3   MCV 83.0   MCH 27.2   PLATELETCT 164         Recent Labs     06/20/20 2321   SODIUM 134*   POTASSIUM 3.9   CHLORIDE 98   CO2 20   CREATININE 1.26        Recent Labs     06/20/20 2321   ALBUMIN 3.9        Pertinent Micro:  Results     Procedure Component Value Units Date/Time    SARS-CoV-2, PCR (In-House) [[id number]]  (Abnormal) Collected:  06/20/20 2325    Order Status:  Completed Updated:  06/21/20 0041     SARS-CoV-2 Source NP Swab     SARS-CoV-2 by PCR DETECTED     Comment: **The [facility] GeneXpert Xpress SARS-CoV-2 Test has been made available for  use under the Emergency Use Authorization (EUA) only.         Narrative:       Droplet, Contact, and Eye Protection  Rule-out COVID-19 panel, includes droplet/contact/eye  isolation order.  Check influenza to order this test only if  specifically indicated.  Expected Turn around time?->Rush (Cepheid 2-4 hours)    Blood Culture [[id number]] Collected:  06/20/20 2321    Order Status:  Completed Specimen:  Blood from Peripheral Updated:  06/21/20 0010    Narrative:       Droplet, Contact, and Eye Protection  1 of 2 for Blood Culture x 2 sites order. Per Hospital  Policy: Only change Specimen Src: to \"Line\" if specified by  physician order.    Blood Culture [[id number]] Collected:  06/20/20 2321    Order Status:  Completed Specimen:  Blood from Peripheral Updated:  06/21/20 0009    Narrative:       Droplet, Contact, and Eye Protection  2 of 2 blood " "culture x2  Sites order. Per Hospital Policy:  Only change Specimen Src: to \"Line\" if specified by physician  order.    COVID/SARS CoV-2 PCR [[id number]] Collected:  06/20/20 2325    Order Status:  Completed Specimen:  Respirate from Nasopharyngeal Updated:  06/20/20 2333     COVID Order Status Received    Narrative:       Droplet, Contact, and Eye Protection  Rule-out COVID-19 panel, includes droplet/contact/eye  isolation order.  Check influenza to order this test only if  specifically indicated.  Expected Turn around time?->Rush (Cepheid 2-4 hours)        No results found for: BLOODCULTU, BLDCULT, BCHOLD     Studies:  Dx-chest-portable (1 View)    Result Date: 6/20/2020 6/20/2020 11:28 PM HISTORY/REASON FOR EXAM:  Shortness of Breath Fever. Suspect possible Covid-19 exposure rule out TECHNIQUE/EXAM DESCRIPTION AND NUMBER OF VIEWS: Single portable view of the chest. COMPARISON: None FINDINGS: Patchy bilateral lower lung opacities No pleural effusion. No pneumothorax. Normal cardiopericardial silhouette.     Patchy bilateral lower lung opacities, atelectasis or multifocal pneumonia.      IMPRESSION:   1.  Acute hypoxic respiratory failure    2.  COVID-19 pneumonia  3.  Hypertension      PLAN:   Jim Neves is a 49 y.o. man with a history of hypertension admitted for progressive shortness of breath and fevers.  Patient found to be hypoxic with bilateral lung opacities in the setting of positive COVID-19 test.  Patient's oxygen requirements have increased overnight.  He is a candidate for both remdesivir and convalescent plasma.    -Continue supportive care.  Self proning, oxygen supplementation, judicious use of IV fluids  -Discussed extensively with patient regarding remdesivir. Patient agrees and verbal consent obtained..  Will start IV remdesivir 200mg x1, followed by 100 mg daily for a 5-day course.  -Monitor LFTs  -Convalescent plasma discussed extensively with the patient.  Patient signed consent and a copy " given to the patient with an additional copy placed in the patient's chart.  -Agree with empiric 5-day course of antibiotics given elevated procalcitonin.    -Inflammatory markers:  CRP: 4.14  Ferritin:3152  LDH: 370  D-dimer: 0.75  Procalcitonin: 0.41  IL-6: Pending  Will recheck above labs and monitor every 48 hours       Plan of care discussed with DAVID Mata D.O.. Will continue to follow    Yolis Lazo M.D.      Please note that this dictation was created using voice recognition software. I have worked with technical experts from [employer] to optimize the interface.  I have made every reasonable attempt to correct obvious errors, but there may be errors of grammar and possibly content that I did not discover before finalizing the note.  w

## 2020-06-21 NOTE — PROGRESS NOTES
2 person skin check done with JOANNA Harrison.     Devices in place: O2 tubing, telemetry monitoring   Skin intact, no pressure injuries noted.   Wound consult: N/A     Pt up independent and able to turn self, educated on no need to wear mask in room to avoid injury to ears, silicone oxygen tubing in place.

## 2020-06-21 NOTE — ASSESSMENT & PLAN NOTE
Pt states he only has sx's with exercise or with his allergies.  Start scheduled BD therapy  Holding on steroids but will have a low thershold to initiate if any worsening of his COVID disease or worsening of his resp exam

## 2020-06-21 NOTE — PROGRESS NOTES
"Convalescent plasma consent    Jim Neves was consented in person to discuss Jim Neves's ([facility] patient code [id number]) enrollment into \"EAP to Convalescent Plasma for the Treatment of Patient's with COVID-19\", Protocol #20-488853 given. He was found to meet all inclusion and no exclusion criteria for enrollment. Jim Neves consented to participation in the study, signed and dated/timed the informed consent form. I signed and dated/timed the consent form in return and a copy was placed in Jim Neves's chart. All study related procedures (administration of convalescent plasma) will take place after the patient signed the informed consent.     Jim Merinos information was entered into [facility] RedCAP website and [facility] transfusion services were contacted by phone regarding the request who will communicate with [facility] whether a compatible convalescent plasma donation is available in the area for administration. [facility] Research coordinator was included in [facility] enrollment and will coordinate follow-up assessments and  with the submitting PI/Physician in compliance with the protocol procedures.  A signed copy of the consent was provided to the patient as well.     "

## 2020-06-21 NOTE — DIETARY
NUTRITION SERVICES: Pt with morbid obesity as evidenced by BMI >40 (43.5). Weight loss counseling not appropriate in acute care setting. Recommend referral to outpatient nutrition services for weight management after D/C, if pt desires.

## 2020-06-21 NOTE — ED PROVIDER NOTES
ED Provider Note    CHIEF COMPLAINT  Chief Complaint   Patient presents with   • Shortness of Breath   • Fever     PT reported that after returning from trip to [place] that he has had a fever and SOB without relief from OTC meds.  PT also with Covid test done on 6/15       HPI  Jim Neves is a 49 y.o. male who presents to the emergency department chief complaint of worsening shortness of breath.  The patient states that he recently did some traveling with his family and his mother and sister who live in [place] have been positive for COVID his mother was admitted to the hospital few days ago.  He has had upper respiratory symptoms since Sunday's are about 6 days.  He endorses body aches nasal congestion fevers and chills states that fevers are currently getting a little better in the sense when he thought however the shortness of breath is getting worse as well as the cough.  He does not smoke he is not on oxygen at home and states that he thought he was getting some exercise-induced asthma so his primary care provider prescribed him an albuterol but he is never used it.    REVIEW OF SYSTEMS  Positives as above. Pertinent negatives include chest pain nausea vomiting diarrhea abdominal pain flank pain dysuria hematuria easy bleeding or bruising weakness numbness or tingling lower extremity edema rash  All other review of systems are negative    PAST MEDICAL HISTORY       SOCIAL HISTORY  Social History     Tobacco Use   • Smoking status: Never Smoker   • Smokeless tobacco: Never Used   Substance and Sexual Activity   • Alcohol use: Yes     Comment: rare   • Drug use: No   • Sexual activity: Yes     Partners: Female       SURGICAL HISTORY  patient denies any surgical history    CURRENT MEDICATIONS  Home Medications    **Home medications have not yet been reviewed for this encounter**         ALLERGIES  No Known Allergies    PHYSICAL EXAM  VITAL SIGNS: /72   Pulse 84   Temp (!) 39 °C (102.2 °F) (Oral)  Would recommend trying Cymbalta 30 mg daily for follow up in 4 weeks.   "  Resp 20   Ht 1.676 m (5' 6\")   Wt 120.2 kg (265 lb)   SpO2 (!) 87%   BMI 42.77 kg/m²    Pulse ox interpretation: I interpret this pulse ox as normal.  Constitutional: Alert in no apparent distress.  Obese male  HENT: Normocephalic atraumatic, MMM  Eyes: PER, Conjunctiva normal, Non-icteric.   Neck: Normal range of motion, No tenderness, Supple, No stridor.   Cardiovascular: Regular rate and rhythm, no murmurs.   Thorax & Lungs: Diminished breath sounds at the bases with faint wheezing no respiratory distress, No chest tenderness.   Abdomen: Bowel sounds normal, Soft, No tenderness, No pulsatile masses. No peritoneal signs.  Skin: Warm, Dry, No erythema, No rash.   Back: No bony tenderness, No CVA tenderness.   Extremities/MSK: Intact distal pulses, No edema, No tenderness, No cyanosis, no major deformities noted  Neurologic: Alert and oriented x3, No focal deficits noted.       DIFFERENTIAL DIAGNOSIS AND WORK UP PLAN    This is a 49 y.o. male who presents with likely COVID infection he was tested earlier this week as an outpatient but has not received the results as of yet.  We will treat him with handheld albuterol he has been hypoxic for EMS and was on oxygen prior to arrival on my evaluation he satting around 90% on room air without severe respiratory distress.  Will observe him after albuterol will see if he requires oxygen.    DIAGNOSTIC STUDIES / PROCEDURES    EKG  Results for orders placed or performed during the hospital encounter of 20   EKG   Result Value Ref Range    Report       [facility] Emergency Dept.    Test Date:  2020  Pt Name:    LISSY ASTUDILLO               Department: ER  MRN:        [id number]                      Room:       [facility]  Gender:     Male                         Technician: [id number]  :        1970                   Requested By:ER TRIAGE PROTOCOL  Order #:    [id number]                    Reading MD: Celeste Seals, " "MD    Measurements  Intervals                                Axis  Rate:       92                           P:          21  SD:         148                          QRS:        96  QRSD:       92                           T:          46  QT:         356  QTc:        441    Interpretive Statements  Sinus rhythm at a rate of 92 no ST elevations or ST depressions no abnormal T    wave inversions no pathognomonic Q waves normal intervals normal axis  No previous ECG available for comparison  Electronically Signed On 6- 1:05:14 PDT by Celeste Seals MD         LABS  Pertinent Lab Findings  COVID order set sent patient CBC within normal limits, CMP with mildly elevated LFTs ferritin level elevated CPK mildly elevated d-dimer also mildly elevated CRP elevated LDH elevated COVID is detected      RADIOLOGY  DX-CHEST-PORTABLE (1 VIEW)   Final Result         Patchy bilateral lower lung opacities, atelectasis or multifocal pneumonia.        The radiologist's interpretation of all radiological studies have been reviewed by me.      COURSE & MEDICAL DECISION MAKING  Pertinent Labs & Imaging studies reviewed. (See chart for details)    12:01 AM  I reassessed patient at the bedside he is requiring nasal cannula oxygenation secondary to desaturation, he is not in severe respiratory distress but understands that he will be hospitalized.  His COVID order set is still resulting.  Secondary to concern for sepsis he was treated with IV antibiotics    I spoke with Dr. Ohara with the hospitalist service and he is accepted the patient    /86   Pulse 84   Temp 36.8 °C (98.2 °F) (Temporal)   Resp 18   Ht 1.676 m (5' 6\")   Wt 120.2 kg (265 lb)   SpO2 96%   BMI 42.77 kg/m²       I verified that the patient was wearing a mask and I was wearing appropriate PPE every time I entered the room. The patient's mask was on the patient at all times during my encounter except for a brief view of the " oropharynx.    DISPOSITION:  Patient will be evaluated for hospitalization by Dr Ohara in guarded condition.        FINAL IMPRESSION  1. COVID 19 pneumonia  2. Hypoxia      Electronically signed by: Celeste Seals M.D., 6/20/2020 11:24 PM    This dictation has been created using voice recognition software and/or scribes. The accuracy of the dictation is limited by the abilities of the software and the expertise of the scribes. I expect there may be some errors of grammar and possibly content. I made every attempt to manually correct the errors within my dictation. However, errors related to voice recognition software and/or scribes may still exist and should be interpreted within the appropriate context.

## 2020-06-21 NOTE — ED TRIAGE NOTES
"Chief Complaint   Patient presents with   • Shortness of Breath   • Fever     PT reported that after returning from trip to [place] that he has had a fever and SOB without relief from OTC meds.  PT also with Covid test done on 6/15   Blood Pressure 118/72   Pulse 84   Temperature (Abnormal) 39 °C (102.2 °F) (Oral)   Respiration 20   Height 1.676 m (5' 6\")   Weight 120.2 kg (265 lb)   Oxygen Saturation (Abnormal) 87%   Body Mass Index 42.77 kg/m²     "

## 2020-06-21 NOTE — PROGRESS NOTES
Assumed care of patient at 0130. Report received from ED RN and pt transported to the floor on tele monitor. A/O x4. Educated on room, oxygen, call light use, fall risk, and plan of care. Bed locked and low, pt ambulating independently. Needs addressed.

## 2020-06-21 NOTE — ASSESSMENT & PLAN NOTE
Mild, likely due to covid   Cont to trend    6/27 increasing LFT, monitor closely    6/28 increasing LFT, monitor  S/p RDV    6/29 improving LFT  We will monitor every 48 hours    6/30 improving, monitor

## 2020-06-21 NOTE — PROGRESS NOTES
[facility] Medicine Daily Progress Note    Date of Service  6/21/2020    Chief Complaint  49 y.o. male admitted 6/20/2020 with fever and SOB    Hospital Course    PMHx HTN.  Presenting with cough and SOB.  outpt COVID neg on 6/15.  He does however have known + exposure to family members.  Sx's developed and worsened over 5 days. In ED temp 102 and 87% on RA.   CXR showing B patchy infiltrates.  Admitted on C3/Zithro for community acquired pneumonia and acute hypoxic resp failure      Interval Problem Update  O2 demand 5 LNC, down from 6 LNC on admission  Pt states he feels the same, still very SOB especially when he tries to walk.  cont's to have body aches and HA.  No N or V.      Consultants/Specialty      Code Status  full    Disposition  Cont in house    Review of Systems  Review of Systems   Constitutional: Positive for malaise/fatigue. Negative for chills and fever.   HENT: Negative for nosebleeds and sore throat.    Eyes: Negative for blurred vision and double vision.   Respiratory: Positive for cough and shortness of breath. Negative for sputum production.    Cardiovascular: Negative for chest pain, palpitations and leg swelling.   Gastrointestinal: Negative for abdominal pain, diarrhea, nausea and vomiting.   Genitourinary: Negative for dysuria and urgency.   Musculoskeletal: Positive for myalgias. Negative for back pain.   Skin: Negative for rash.   Neurological: Negative for dizziness, loss of consciousness and headaches.        Physical Exam  Temp:  [36.8 °C (98.2 °F)-39 °C (102.2 °F)] 37.9 °C (100.3 °F)  Pulse:  [84-91] 91  Resp:  [18-20] 18  BP: (100-118)/(59-86) 100/59  SpO2:  [87 %-96 %] 91 %    Physical Exam  Vitals signs reviewed.   Constitutional:       General: He is not in acute distress.     Appearance: He is well-developed. He is not diaphoretic.   HENT:      Head: Normocephalic and atraumatic.   Eyes:      Conjunctiva/sclera: Conjunctivae normal.   Neck:      Vascular: No JVD.   Cardiovascular:       Rate and Rhythm: Normal rate.      Heart sounds: No murmur. No gallop.    Pulmonary:      Effort: Pulmonary effort is normal. No respiratory distress.      Breath sounds: No stridor. No wheezing or rales.      Comments: Diminished breath sounds  Abdominal:      Palpations: Abdomen is soft.      Tenderness: There is no abdominal tenderness. There is no guarding or rebound.   Musculoskeletal:         General: No tenderness.      Right lower leg: No edema.      Left lower leg: No edema.   Skin:     General: Skin is warm and dry.      Findings: No rash.   Neurological:      Mental Status: He is oriented to person, place, and time.   Psychiatric:         Thought Content: Thought content normal.         Fluids    Intake/Output Summary (Last 24 hours) at 6/21/2020 0607  Last data filed at 6/21/2020 0135  Gross per 24 hour   Intake 800 ml   Output --   Net 800 ml       Laboratory  Recent Labs     06/20/20  2321   WBC 5.9   RBC 5.58   HEMOGLOBIN 15.2   HEMATOCRIT 46.3   MCV 83.0   MCH 27.2   MCHC 32.8*   RDW 41.6   PLATELETCT 164   MPV 9.4     Recent Labs     06/20/20  2321   SODIUM 134*   POTASSIUM 3.9   CHLORIDE 98   CO2 20   GLUCOSE 110*   BUN 20   CREATININE 1.26   CALCIUM 8.4*     Recent Labs     06/20/20  2321   APTT 32.3   INR 0.88               Imaging  DX-CHEST-PORTABLE (1 VIEW)   Final Result         Patchy bilateral lower lung opacities, atelectasis or multifocal pneumonia.           Assessment/Plan  * Pneumonia due to COVID-19 virus  Assessment & Plan  DC tele  Wean 02 as tolerated  Cont with empiric IV abx   Follow up covid 19 labs ordered   Follow cultures  Prn tylenol ordered  Contact precautions   Low threshold for steroids in setting of reactive airway disease      Reactive airway disease with acute exacerbation  Assessment & Plan  Pt states he only has sx's with exercise or with his allergies.  Start scheduled BD therapy  Holding on steroids but will have a low thershold to initiate if any worsening of  his COVID disease or worsening of his resp exam    Abnormal LFTs  Assessment & Plan  Mild, likely due to covid   Cont ot trend    Hyponatremia  Assessment & Plan  Mild, cont to trend    Acute hypoxemic respiratory failure (HCC)  Assessment & Plan  Wean 02 as tolerated  COVID +, Hx reactive airway disease, possible bacterial superinfection    BMI 40.0-44.9, adult (HCC)- (present on admission)  Assessment & Plan  Encourage weight loss    HTN (hypertension), benign- (present on admission)  Assessment & Plan  Resume home CCB, BB and ARB with parameters       VTE prophylaxis: enoxaparin

## 2020-06-22 LAB
ALBUMIN SERPL BCP-MCNC: 3.9 G/DL (ref 3.2–4.9)
ALBUMIN/GLOB SERPL: 1.2 G/DL
ALP SERPL-CCNC: 45 U/L (ref 30–99)
ALT SERPL-CCNC: 45 U/L (ref 2–50)
ANION GAP SERPL CALC-SCNC: 13 MMOL/L (ref 7–16)
AST SERPL-CCNC: 45 U/L (ref 12–45)
BASOPHILS # BLD AUTO: 0.4 % (ref 0–1.8)
BASOPHILS # BLD: 0.02 K/UL (ref 0–0.12)
BILIRUB SERPL-MCNC: 0.3 MG/DL (ref 0.1–1.5)
BUN SERPL-MCNC: 18 MG/DL (ref 8–22)
CALCIUM SERPL-MCNC: 8.4 MG/DL (ref 8.5–10.5)
CHLORIDE SERPL-SCNC: 100 MMOL/L (ref 96–112)
CO2 SERPL-SCNC: 23 MMOL/L (ref 20–33)
CREAT SERPL-MCNC: 1.14 MG/DL (ref 0.5–1.4)
EOSINOPHIL # BLD AUTO: 0.01 K/UL (ref 0–0.51)
EOSINOPHIL NFR BLD: 0.2 % (ref 0–6.9)
ERYTHROCYTE [DISTWIDTH] IN BLOOD BY AUTOMATED COUNT: 42.5 FL (ref 35.9–50)
GLOBULIN SER CALC-MCNC: 3.3 G/DL (ref 1.9–3.5)
GLUCOSE SERPL-MCNC: 112 MG/DL (ref 65–99)
HCT VFR BLD AUTO: 43.7 % (ref 42–52)
HGB BLD-MCNC: 13.9 G/DL (ref 14–18)
IMM GRANULOCYTES # BLD AUTO: 0.02 K/UL (ref 0–0.11)
IMM GRANULOCYTES NFR BLD AUTO: 0.4 % (ref 0–0.9)
LYMPHOCYTES # BLD AUTO: 1.37 K/UL (ref 1–4.8)
LYMPHOCYTES NFR BLD: 28 % (ref 22–41)
MCH RBC QN AUTO: 27 PG (ref 27–33)
MCHC RBC AUTO-ENTMCNC: 31.8 G/DL (ref 33.7–35.3)
MCV RBC AUTO: 84.9 FL (ref 81.4–97.8)
MONOCYTES # BLD AUTO: 0.48 K/UL (ref 0–0.85)
MONOCYTES NFR BLD AUTO: 9.8 % (ref 0–13.4)
NEUTROPHILS # BLD AUTO: 2.99 K/UL (ref 1.82–7.42)
NEUTROPHILS NFR BLD: 61.2 % (ref 44–72)
NRBC # BLD AUTO: 0 K/UL
NRBC BLD-RTO: 0 /100 WBC
PLATELET # BLD AUTO: 184 K/UL (ref 164–446)
PMV BLD AUTO: 10 FL (ref 9–12.9)
POTASSIUM SERPL-SCNC: 4.1 MMOL/L (ref 3.6–5.5)
PROT SERPL-MCNC: 7.2 G/DL (ref 6–8.2)
RBC # BLD AUTO: 5.15 M/UL (ref 4.7–6.1)
SODIUM SERPL-SCNC: 136 MMOL/L (ref 135–145)
WBC # BLD AUTO: 4.9 K/UL (ref 4.8–10.8)

## 2020-06-22 PROCEDURE — 770021 HCHG ROOM/CARE - ISO PRIVATE

## 2020-06-22 PROCEDURE — A9270 NON-COVERED ITEM OR SERVICE: HCPCS | Performed by: HOSPITALIST

## 2020-06-22 PROCEDURE — A9270 NON-COVERED ITEM OR SERVICE: HCPCS | Performed by: INTERNAL MEDICINE

## 2020-06-22 PROCEDURE — 700102 HCHG RX REV CODE 250 W/ 637 OVERRIDE(OP): Performed by: HOSPITALIST

## 2020-06-22 PROCEDURE — 700111 HCHG RX REV CODE 636 W/ 250 OVERRIDE (IP): Performed by: HOSPITALIST

## 2020-06-22 PROCEDURE — 700105 HCHG RX REV CODE 258: Performed by: INTERNAL MEDICINE

## 2020-06-22 PROCEDURE — 700105 HCHG RX REV CODE 258

## 2020-06-22 PROCEDURE — 94760 N-INVAS EAR/PLS OXIMETRY 1: CPT

## 2020-06-22 PROCEDURE — 99233 SBSQ HOSP IP/OBS HIGH 50: CPT | Mod: CS | Performed by: INTERNAL MEDICINE

## 2020-06-22 PROCEDURE — 700102 HCHG RX REV CODE 250 W/ 637 OVERRIDE(OP): Performed by: INTERNAL MEDICINE

## 2020-06-22 PROCEDURE — 99232 SBSQ HOSP IP/OBS MODERATE 35: CPT | Mod: CS | Performed by: HOSPITALIST

## 2020-06-22 PROCEDURE — 36415 COLL VENOUS BLD VENIPUNCTURE: CPT

## 2020-06-22 PROCEDURE — 94640 AIRWAY INHALATION TREATMENT: CPT

## 2020-06-22 PROCEDURE — 80053 COMPREHEN METABOLIC PANEL: CPT

## 2020-06-22 PROCEDURE — 85025 COMPLETE CBC W/AUTO DIFF WBC: CPT

## 2020-06-22 PROCEDURE — 700111 HCHG RX REV CODE 636 W/ 250 OVERRIDE (IP): Performed by: INTERNAL MEDICINE

## 2020-06-22 RX ORDER — SODIUM CHLORIDE 9 MG/ML
INJECTION, SOLUTION INTRAVENOUS
Status: COMPLETED
Start: 2020-06-22 | End: 2020-06-22

## 2020-06-22 RX ADMIN — ALBUTEROL SULFATE 2 PUFF: 90 AEROSOL, METERED RESPIRATORY (INHALATION) at 14:44

## 2020-06-22 RX ADMIN — SODIUM CHLORIDE 40 ML: 9 INJECTION, SOLUTION INTRAVENOUS at 17:45

## 2020-06-22 RX ADMIN — AMPICILLIN AND SULBACTAM 3 G: 2; 1 INJECTION, POWDER, FOR SOLUTION INTRAVENOUS at 17:29

## 2020-06-22 RX ADMIN — METOPROLOL TARTRATE 25 MG: 25 TABLET, FILM COATED ORAL at 04:52

## 2020-06-22 RX ADMIN — ALBUTEROL SULFATE 2 PUFF: 90 AEROSOL, METERED RESPIRATORY (INHALATION) at 07:16

## 2020-06-22 RX ADMIN — METOPROLOL TARTRATE 25 MG: 25 TABLET, FILM COATED ORAL at 17:28

## 2020-06-22 RX ADMIN — ALBUTEROL SULFATE 2 PUFF: 90 AEROSOL, METERED RESPIRATORY (INHALATION) at 20:50

## 2020-06-22 RX ADMIN — AZITHROMYCIN MONOHYDRATE 500 MG: 250 TABLET ORAL at 04:52

## 2020-06-22 RX ADMIN — AMLODIPINE BESYLATE 10 MG: 5 TABLET ORAL at 04:52

## 2020-06-22 RX ADMIN — REMDESIVIR 100 MG: 5 INJECTION INTRAVENOUS at 13:40

## 2020-06-22 RX ADMIN — LOSARTAN POTASSIUM 50 MG: 50 TABLET, FILM COATED ORAL at 04:51

## 2020-06-22 RX ADMIN — ACETAMINOPHEN 650 MG: 325 TABLET, FILM COATED ORAL at 04:52

## 2020-06-22 RX ADMIN — ACETAMINOPHEN 650 MG: 325 TABLET, FILM COATED ORAL at 21:30

## 2020-06-22 RX ADMIN — ALBUTEROL SULFATE 2 PUFF: 90 AEROSOL, METERED RESPIRATORY (INHALATION) at 10:24

## 2020-06-22 RX ADMIN — ENOXAPARIN SODIUM 40 MG: 100 INJECTION SUBCUTANEOUS at 04:52

## 2020-06-22 ASSESSMENT — ENCOUNTER SYMPTOMS
CHILLS: 0
DIAPHORESIS: 1
COUGH: 1
VOMITING: 0
LOSS OF CONSCIOUSNESS: 0
HEMOPTYSIS: 0
FEVER: 0
BACK PAIN: 0
ABDOMINAL PAIN: 0
NAUSEA: 0
BLURRED VISION: 0
MYALGIAS: 0
DIZZINESS: 0
PALPITATIONS: 0
MYALGIAS: 1
SHORTNESS OF BREATH: 1
DOUBLE VISION: 0
SPUTUM PRODUCTION: 0
DIARRHEA: 0
HEADACHES: 0
FEVER: 1
SORE THROAT: 0

## 2020-06-22 NOTE — CARE PLAN
Problem: Bronchoconstriction:  Goal: Improve in air movement and diminished wheezing  Outcome: PROGRESSING AS EXPECTED   Albuterol MDI Q4

## 2020-06-22 NOTE — CARE PLAN
Assumed day shift care at start of shift  Patient a+o x 4  Denies pain  Denies sob / +mckeon / 93% on 4lpm of oxygen via oxymask / ls dim throughout / IS up to 1250ml  Patient encouraged to cough and deep breath  No needs at this time, wctm    Fall precautions/hourly rounding maintained, call light within reach and functioning, all items within reach.  Patient encouraged to call for assistance, poc reviewed with patient, ?'s/concerns answered.       Problem: Infection  Goal: Will remain free from infection  Outcome: PROGRESSING AS EXPECTED     Problem: Knowledge Deficit  Goal: Knowledge of disease process/condition, treatment plan, diagnostic tests, and medications will improve  Outcome: PROGRESSING AS EXPECTED  Goal: Knowledge of the prescribed therapeutic regimen will improve  Outcome: PROGRESSING AS EXPECTED     Problem: Pain Management  Goal: Pain level will decrease to patient's comfort goal  Outcome: PROGRESSING AS EXPECTED     Problem: Respiratory:  Goal: Respiratory status will improve  Outcome: PROGRESSING AS EXPECTED

## 2020-06-22 NOTE — CARE PLAN
Problem: Bronchoconstriction:  Goal: Improve in air movement and diminished wheezing  Outcome: PROGRESSING AS EXPECTED

## 2020-06-22 NOTE — PROGRESS NOTES
Infectious Disease Progress Note    Author: Yolis Lazo M.D. Date & Time of service: 2020  8:50 AM    Chief Complaint:  Follow-up for COVID-19 pneumonia    Interval History:   T-max 99.5 no CBC done today.  Fever curve slightly improved.  Down to 4 L nasal cannula.  Received convalescent plasma and first dose of remdesivir yesterday and tolerated them.  Feels as if his fevers are improving.  Still struggles with shortness of breath    Labs Reviewed and Medications Reviewed.    Review of Systems:  Review of Systems   Constitutional: Positive for diaphoresis, fever and malaise/fatigue.   Respiratory: Positive for cough and shortness of breath. Negative for hemoptysis.    Cardiovascular: Negative for chest pain.   Gastrointestinal: Negative for abdominal pain, diarrhea, nausea and vomiting.   Musculoskeletal: Positive for myalgias.   Neurological: Negative for dizziness and headaches.       Hemodynamics:  Temp (24hrs), Av.4 °C (99.3 °F), Min:35.9 °C (96.6 °F), Max:38 °C (100.4 °F)  Temperature: 35.9 °C (96.6 °F)  Pulse  Av.1  Min: 84  Max: 107   Blood Pressure: 103/59       Physical Exam:  Physical Exam  Constitutional:       Appearance: He is obese. He is ill-appearing.   HENT:      Mouth/Throat:      Mouth: Mucous membranes are moist.      Pharynx: No oropharyngeal exudate.   Eyes:      Extraocular Movements: Extraocular movements intact.      Conjunctiva/sclera: Conjunctivae normal.      Pupils: Pupils are equal, round, and reactive to light.   Neck:      Musculoskeletal: Normal range of motion and neck supple.   Cardiovascular:      Rate and Rhythm: Regular rhythm. Tachycardia present.   Pulmonary:      Effort: Pulmonary effort is normal.   Abdominal:      Palpations: Abdomen is soft.      Tenderness: There is no abdominal tenderness.   Musculoskeletal: Normal range of motion.   Skin:     General: Skin is warm and dry.   Neurological:      General: No focal deficit present.      Mental  Status: He is alert and oriented to person, place, and time.      Cranial Nerves: No cranial nerve deficit.   Psychiatric:         Mood and Affect: Mood normal.         Behavior: Behavior normal.         Meds:    Current Facility-Administered Medications:   •  ondansetron  •  ondansetron  •  enoxaparin  •  amLODIPine  •  losartan  •  metoprolol  •  acetaminophen  •  azithromycin  •  albuterol  •  [COMPLETED] Remdesivir in NS IVPB **FOLLOWED BY** Remdesivir in NS IVPB  •  ampicillin-sulbactam (UNASYN) IV    Labs:  Recent Labs     06/20/20 2321   WBC 5.9   RBC 5.58   HEMOGLOBIN 15.2   HEMATOCRIT 46.3   MCV 83.0   MCH 27.2   RDW 41.6   PLATELETCT 164   MPV 9.4   NEUTSPOLYS 64.60   LYMPHOCYTES 25.80   MONOCYTES 8.50   EOSINOPHILS 0.30   BASOPHILS 0.30     Recent Labs     06/20/20 2321 06/21/20 0339   SODIUM 134* 129*   POTASSIUM 3.9 4.1   CHLORIDE 98 99   CO2 20 16*   GLUCOSE 110* 109*   BUN 20 17   CPKTOTAL 556*  --      Recent Labs     06/20/20 2321 06/21/20 0339   ALBUMIN 3.9 3.3   TBILIRUBIN 0.4 0.2   ALKPHOSPHAT 50 42   TOTPROTEIN 7.4 7.0   ALTSGPT 53* 47   ASTSGOT 55* 49*   CREATININE 1.26 0.99       Imaging:  Dx-chest-portable (1 View)    Result Date: 6/20/2020 6/20/2020 11:28 PM HISTORY/REASON FOR EXAM:  Shortness of Breath Fever. Suspect possible Covid-19 exposure rule out TECHNIQUE/EXAM DESCRIPTION AND NUMBER OF VIEWS: Single portable view of the chest. COMPARISON: None FINDINGS: Patchy bilateral lower lung opacities No pleural effusion. No pneumothorax. Normal cardiopericardial silhouette.     Patchy bilateral lower lung opacities, atelectasis or multifocal pneumonia.      Micro:  Results     Procedure Component Value Units Date/Time    Blood Culture [[id number]] Collected:  06/20/20 2321    Order Status:  Completed Specimen:  Blood from Peripheral Updated:  06/22/20 0726     Significant Indicator NEG     Source BLD     Site PERIPHERAL     Culture Result No Growth  Note: Blood cultures are incubated for  "5 days and  are monitored continuously.Positive blood cultures  are called to the RN and reported as soon as  they are identified.      Narrative:       Droplet, Contact, and Eye Protection  2 of 2 blood culture x2  Sites order. Per Hospital Policy:  Only change Specimen Src: to \"Line\" if specified by physician  order.  No site indicated    Blood Culture [[id number]] Collected:  06/20/20 2321    Order Status:  Completed Specimen:  Blood from Peripheral Updated:  06/22/20 0726     Significant Indicator NEG     Source BLD     Site PERIPHERAL     Culture Result No Growth  Note: Blood cultures are incubated for 5 days and  are monitored continuously.Positive blood cultures  are called to the RN and reported as soon as  they are identified.      Narrative:       Droplet, Contact, and Eye Protection  1 of 2 for Blood Culture x 2 sites order. Per Hospital  Policy: Only change Specimen Src: to \"Line\" if specified by  physician order.  No site indicated    SARS-CoV-2, PCR (In-House) [[id number]]  (Abnormal) Collected:  06/20/20 2325    Order Status:  Completed Updated:  06/21/20 0041     SARS-CoV-2 Source NP Swab     SARS-CoV-2 by PCR DETECTED     Comment: **[facility] GeneXpert Xpress SARS-CoV-2 Test has been made available for  use under the Emergency Use Authorization (EUA) only.         Narrative:       Droplet, Contact, and Eye Protection  Rule-out COVID-19 panel, includes droplet/contact/eye  isolation order.  Check influenza to order this test only if  specifically indicated.  Expected Turn around time?->Rush (Cepheid 2-4 hours)    COVID/SARS CoV-2 PCR [[id number]] Collected:  06/20/20 2325    Order Status:  Completed Specimen:  Respirate from Nasopharyngeal Updated:  06/20/20 2333     COVID Order Status Received    Narrative:       Droplet, Contact, and Eye Protection  Rule-out COVID-19 panel, includes droplet/contact/eye  isolation order.  Check influenza to order this test only if  specifically indicated.  Expected " Turn around time?->Rush (Cepheid 2-4 hours)          Assessment:  Active Hospital Problems    Diagnosis   • *Pneumonia due to COVID-19 virus [U07.1, J12.89]   • Acute hypoxemic respiratory failure (HCC) [J96.01]   • Hyponatremia [E87.1]   • Abnormal LFTs [R94.5]   • Reactive airway disease with acute exacerbation [J45.901]   • HTN (hypertension), benign [I10]   • BMI 40.0-44.9, adult (HCC) [Z68.41]       Plan:  Acute hypoxic respiratory failure  COVID-19 pneumonia  Fever curve improving  Now on 4 L nasal cannula    -Continue supportive care.  Self proning, oxygen supplementation, judicious use of IV fluids  -Continue IV remdesivir 200mg x1, followed by 100 mg daily for a 5-day course.  Stop date 6/26/2020  -Monitor LFTs.  Mildly elevated  -Convalescent plasma given on 6/21/2020  -Follow blood cultures from 6/20, no growth till date  -Agree with 5-day course of empiric antibiotics given elevated procalcitonin.    -Inflammatory markers:  CRP: 4.14  Ferritin:3152  LDH: 370  D-dimer: 0.75  Procalcitonin: 0.41  IL-6: Pending    Will recheck above labs and monitor every 48 hours     Hypertension    Discussed with internal medicine/Dr. Vincent

## 2020-06-22 NOTE — PROGRESS NOTES
[facility] Medicine Daily Progress Note    Date of Service  6/22/2020    Chief Complaint  49 y.o. male admitted 6/20/2020 with fever and SOB    Hospital Course    PMHx HTN.  Presenting with cough and SOB.  outpt COVID neg on 6/15.  He does however have known + exposure to family members.  Sx's developed and worsened over 5 days. In ED temp 102 and 87% on RA.   CXR showing B patchy infiltrates.  Admitted on C3/Zithro for community acquired pneumonia and acute hypoxic resp failure      Interval Problem Update  Feels a little better today.  Thinks that his fever is broken.  Still short of breath, thinks perhaps a little bit less than yesterday.  Dry cough continues.    Consultants/Specialty  ID    Code Status  full    Disposition  Cont in house    Review of Systems  Review of Systems   Constitutional: Positive for malaise/fatigue. Negative for chills and fever.   HENT: Negative for nosebleeds and sore throat.    Eyes: Negative for blurred vision and double vision.   Respiratory: Positive for cough and shortness of breath. Negative for sputum production.    Cardiovascular: Negative for chest pain, palpitations and leg swelling.   Gastrointestinal: Negative for abdominal pain, diarrhea, nausea and vomiting.   Genitourinary: Negative for dysuria and urgency.   Musculoskeletal: Negative for back pain and myalgias.   Skin: Negative for rash.   Neurological: Negative for dizziness, loss of consciousness and headaches.        Physical Exam  Temp:  [36.9 °C (98.5 °F)-38 °C (100.4 °F)] 37.5 °C (99.5 °F)  Pulse:  [] 107  Resp:  [18-20] 18  BP: (105-130)/(65-78) 130/65  SpO2:  [90 %-97 %] 93 %    Physical Exam  Vitals signs reviewed.   Constitutional:       General: He is not in acute distress.     Appearance: He is well-developed. He is not diaphoretic.   HENT:      Head: Normocephalic and atraumatic.   Eyes:      Conjunctiva/sclera: Conjunctivae normal.   Neck:      Vascular: No JVD.   Cardiovascular:      Rate and Rhythm:  Normal rate.      Heart sounds: No murmur. No gallop.    Pulmonary:      Effort: Pulmonary effort is normal. No respiratory distress.      Breath sounds: No stridor. No wheezing or rales.      Comments: Diminished breath sounds  Abdominal:      Palpations: Abdomen is soft.      Tenderness: There is no abdominal tenderness. There is no guarding or rebound.   Musculoskeletal:         General: No tenderness.      Right lower leg: No edema.      Left lower leg: No edema.   Skin:     General: Skin is warm and dry.      Findings: No rash.   Neurological:      General: No focal deficit present.      Mental Status: He is oriented to person, place, and time. Mental status is at baseline.   Psychiatric:         Thought Content: Thought content normal.         Fluids    Intake/Output Summary (Last 24 hours) at 6/22/2020 0605  Last data filed at 6/21/2020 2000  Gross per 24 hour   Intake 587 ml   Output --   Net 587 ml       Laboratory  Recent Labs     06/20/20  2321   WBC 5.9   RBC 5.58   HEMOGLOBIN 15.2   HEMATOCRIT 46.3   MCV 83.0   MCH 27.2   MCHC 32.8*   RDW 41.6   PLATELETCT 164   MPV 9.4     Recent Labs     06/20/20  2321 06/21/20  0339   SODIUM 134* 129*   POTASSIUM 3.9 4.1   CHLORIDE 98 99   CO2 20 16*   GLUCOSE 110* 109*   BUN 20 17   CREATININE 1.26 0.99   CALCIUM 8.4* 8.2*     Recent Labs     06/20/20  2321   APTT 32.3   INR 0.88               Imaging  DX-CHEST-PORTABLE (1 VIEW)   Final Result         Patchy bilateral lower lung opacities, atelectasis or multifocal pneumonia.           Assessment/Plan  * Pneumonia due to COVID-19 virus  Assessment & Plan  DC tele  Wean 02 as tolerated  Cont with empiric IV abx   Follow up covid 19 labs ordered   Follow cultures  Remdesivir D2  S/p convalescent plasma 6/21  ID following   Clinically is improved  DDimer<1: prphylactic dosing enoxaparin      Reactive airway disease with acute exacerbation  Assessment & Plan  Pt states he only has sx's with exercise or with his  allergies.  Start scheduled BD therapy  Holding on steroids but will have a low thershold to initiate if any worsening of his COVID disease or worsening of his resp exam    Abnormal LFTs  Assessment & Plan  Mild, likely due to covid   Cont ot trend    Hyponatremia  Assessment & Plan  Mild, cont to trend    Acute hypoxemic respiratory failure (HCC)  Assessment & Plan  Wean 02 as tolerated  COVID +, Hx reactive airway disease, possible bacterial superinfection    BMI 40.0-44.9, adult (HCC)- (present on admission)  Assessment & Plan  Encourage weight loss    HTN (hypertension), benign- (present on admission)  Assessment & Plan  Resume home CCB, BB and ARB with parameters       VTE prophylaxis: enoxaparin

## 2020-06-23 LAB
ALBUMIN SERPL BCP-MCNC: 3.6 G/DL (ref 3.2–4.9)
ALBUMIN/GLOB SERPL: 1.1 G/DL
ALP SERPL-CCNC: 41 U/L (ref 30–99)
ALT SERPL-CCNC: 38 U/L (ref 2–50)
ANION GAP SERPL CALC-SCNC: 14 MMOL/L (ref 7–16)
APTT PPP: 27.8 SEC (ref 24.7–36)
AST SERPL-CCNC: 43 U/L (ref 12–45)
BASOPHILS # BLD AUTO: 0.2 % (ref 0–1.8)
BASOPHILS # BLD: 0.01 K/UL (ref 0–0.12)
BILIRUB CONJ SERPL-MCNC: <0.2 MG/DL (ref 0.1–0.5)
BILIRUB INDIRECT SERPL-MCNC: NORMAL MG/DL (ref 0–1)
BILIRUB SERPL-MCNC: 0.4 MG/DL (ref 0.1–1.5)
BUN SERPL-MCNC: 21 MG/DL (ref 8–22)
CALCIUM SERPL-MCNC: 8.3 MG/DL (ref 8.5–10.5)
CHLORIDE SERPL-SCNC: 100 MMOL/L (ref 96–112)
CK SERPL-CCNC: 823 U/L (ref 0–154)
CO2 SERPL-SCNC: 21 MMOL/L (ref 20–33)
CREAT SERPL-MCNC: 1.06 MG/DL (ref 0.5–1.4)
CRP SERPL HS-MCNC: 4.27 MG/DL (ref 0–0.75)
D DIMER PPP IA.FEU-MCNC: 1.1 UG/ML (FEU) (ref 0–0.5)
EOSINOPHIL # BLD AUTO: 0 K/UL (ref 0–0.51)
EOSINOPHIL NFR BLD: 0 % (ref 0–6.9)
ERYTHROCYTE [DISTWIDTH] IN BLOOD BY AUTOMATED COUNT: 42.7 FL (ref 35.9–50)
FERRITIN SERPL-MCNC: 3132 NG/ML (ref 22–322)
FIBRINOGEN PPP-MCNC: 532 MG/DL (ref 215–460)
GLOBULIN SER CALC-MCNC: 3.2 G/DL (ref 1.9–3.5)
GLUCOSE SERPL-MCNC: 113 MG/DL (ref 65–99)
HCT VFR BLD AUTO: 40.8 % (ref 42–52)
HGB BLD-MCNC: 13.1 G/DL (ref 14–18)
IL6 SERPL-MCNC: <2 PG/ML
IMM GRANULOCYTES # BLD AUTO: 0.03 K/UL (ref 0–0.11)
IMM GRANULOCYTES NFR BLD AUTO: 0.6 % (ref 0–0.9)
INR PPP: 1.01 (ref 0.87–1.13)
LDH SERPL L TO P-CCNC: 401 U/L (ref 107–266)
LYMPHOCYTES # BLD AUTO: 1.12 K/UL (ref 1–4.8)
LYMPHOCYTES NFR BLD: 22.2 % (ref 22–41)
MAGNESIUM SERPL-MCNC: 2.1 MG/DL (ref 1.5–2.5)
MCH RBC QN AUTO: 27 PG (ref 27–33)
MCHC RBC AUTO-ENTMCNC: 32.1 G/DL (ref 33.7–35.3)
MCV RBC AUTO: 84.1 FL (ref 81.4–97.8)
MONOCYTES # BLD AUTO: 0.45 K/UL (ref 0–0.85)
MONOCYTES NFR BLD AUTO: 8.9 % (ref 0–13.4)
NEUTROPHILS # BLD AUTO: 3.44 K/UL (ref 1.82–7.42)
NEUTROPHILS NFR BLD: 68.1 % (ref 44–72)
NRBC # BLD AUTO: 0 K/UL
NRBC BLD-RTO: 0 /100 WBC
NT-PROBNP SERPL IA-MCNC: 11 PG/ML (ref 0–125)
PLATELET # BLD AUTO: 168 K/UL (ref 164–446)
PMV BLD AUTO: 9.7 FL (ref 9–12.9)
POTASSIUM SERPL-SCNC: 4 MMOL/L (ref 3.6–5.5)
PROT SERPL-MCNC: 6.8 G/DL (ref 6–8.2)
PROTHROMBIN TIME: 13.6 SEC (ref 12–14.6)
RBC # BLD AUTO: 4.85 M/UL (ref 4.7–6.1)
SODIUM SERPL-SCNC: 135 MMOL/L (ref 135–145)
TROPONIN T SERPL-MCNC: 12 NG/L (ref 6–19)
WBC # BLD AUTO: 5.1 K/UL (ref 4.8–10.8)

## 2020-06-23 PROCEDURE — 82248 BILIRUBIN DIRECT: CPT

## 2020-06-23 PROCEDURE — 700111 HCHG RX REV CODE 636 W/ 250 OVERRIDE (IP): Performed by: HOSPITALIST

## 2020-06-23 PROCEDURE — 83880 ASSAY OF NATRIURETIC PEPTIDE: CPT

## 2020-06-23 PROCEDURE — A9270 NON-COVERED ITEM OR SERVICE: HCPCS | Performed by: HOSPITALIST

## 2020-06-23 PROCEDURE — A9270 NON-COVERED ITEM OR SERVICE: HCPCS | Performed by: INTERNAL MEDICINE

## 2020-06-23 PROCEDURE — 83735 ASSAY OF MAGNESIUM: CPT

## 2020-06-23 PROCEDURE — 86140 C-REACTIVE PROTEIN: CPT

## 2020-06-23 PROCEDURE — 83520 IMMUNOASSAY QUANT NOS NONAB: CPT

## 2020-06-23 PROCEDURE — 80053 COMPREHEN METABOLIC PANEL: CPT

## 2020-06-23 PROCEDURE — 94760 N-INVAS EAR/PLS OXIMETRY 1: CPT

## 2020-06-23 PROCEDURE — 99232 SBSQ HOSP IP/OBS MODERATE 35: CPT | Mod: CS | Performed by: INTERNAL MEDICINE

## 2020-06-23 PROCEDURE — 99233 SBSQ HOSP IP/OBS HIGH 50: CPT | Mod: CS | Performed by: INTERNAL MEDICINE

## 2020-06-23 PROCEDURE — 83615 LACTATE (LD) (LDH) ENZYME: CPT

## 2020-06-23 PROCEDURE — 700102 HCHG RX REV CODE 250 W/ 637 OVERRIDE(OP): Performed by: HOSPITALIST

## 2020-06-23 PROCEDURE — 700111 HCHG RX REV CODE 636 W/ 250 OVERRIDE (IP): Performed by: INTERNAL MEDICINE

## 2020-06-23 PROCEDURE — 700105 HCHG RX REV CODE 258: Performed by: INTERNAL MEDICINE

## 2020-06-23 PROCEDURE — 82728 ASSAY OF FERRITIN: CPT

## 2020-06-23 PROCEDURE — 770021 HCHG ROOM/CARE - ISO PRIVATE

## 2020-06-23 PROCEDURE — 85379 FIBRIN DEGRADATION QUANT: CPT

## 2020-06-23 PROCEDURE — 94640 AIRWAY INHALATION TREATMENT: CPT

## 2020-06-23 PROCEDURE — 700102 HCHG RX REV CODE 250 W/ 637 OVERRIDE(OP): Performed by: INTERNAL MEDICINE

## 2020-06-23 PROCEDURE — 85025 COMPLETE CBC W/AUTO DIFF WBC: CPT

## 2020-06-23 PROCEDURE — 85730 THROMBOPLASTIN TIME PARTIAL: CPT

## 2020-06-23 PROCEDURE — 85384 FIBRINOGEN ACTIVITY: CPT

## 2020-06-23 PROCEDURE — 82550 ASSAY OF CK (CPK): CPT

## 2020-06-23 PROCEDURE — 85610 PROTHROMBIN TIME: CPT

## 2020-06-23 PROCEDURE — 84484 ASSAY OF TROPONIN QUANT: CPT

## 2020-06-23 PROCEDURE — 36415 COLL VENOUS BLD VENIPUNCTURE: CPT

## 2020-06-23 RX ADMIN — ACETAMINOPHEN 650 MG: 325 TABLET, FILM COATED ORAL at 15:53

## 2020-06-23 RX ADMIN — ALBUTEROL SULFATE 2 PUFF: 90 AEROSOL, METERED RESPIRATORY (INHALATION) at 15:00

## 2020-06-23 RX ADMIN — ENOXAPARIN SODIUM 80 MG: 80 INJECTION SUBCUTANEOUS at 13:21

## 2020-06-23 RX ADMIN — AMPICILLIN AND SULBACTAM 3 G: 2; 1 INJECTION, POWDER, FOR SOLUTION INTRAVENOUS at 00:13

## 2020-06-23 RX ADMIN — ALBUTEROL SULFATE 2 PUFF: 90 AEROSOL, METERED RESPIRATORY (INHALATION) at 06:28

## 2020-06-23 RX ADMIN — METOPROLOL TARTRATE 25 MG: 25 TABLET, FILM COATED ORAL at 05:32

## 2020-06-23 RX ADMIN — AMPICILLIN AND SULBACTAM 3 G: 2; 1 INJECTION, POWDER, FOR SOLUTION INTRAVENOUS at 23:35

## 2020-06-23 RX ADMIN — ALBUTEROL SULFATE 2 PUFF: 90 AEROSOL, METERED RESPIRATORY (INHALATION) at 19:00

## 2020-06-23 RX ADMIN — REMDESIVIR 100 MG: 5 INJECTION INTRAVENOUS at 13:21

## 2020-06-23 RX ADMIN — ENOXAPARIN SODIUM 40 MG: 100 INJECTION SUBCUTANEOUS at 05:33

## 2020-06-23 RX ADMIN — ENOXAPARIN SODIUM 120 MG: 120 INJECTION SUBCUTANEOUS at 18:36

## 2020-06-23 RX ADMIN — AMLODIPINE BESYLATE 10 MG: 5 TABLET ORAL at 05:32

## 2020-06-23 RX ADMIN — ALBUTEROL SULFATE 2 PUFF: 90 AEROSOL, METERED RESPIRATORY (INHALATION) at 10:50

## 2020-06-23 RX ADMIN — AMPICILLIN AND SULBACTAM 3 G: 2; 1 INJECTION, POWDER, FOR SOLUTION INTRAVENOUS at 11:40

## 2020-06-23 RX ADMIN — AMPICILLIN AND SULBACTAM 3 G: 2; 1 INJECTION, POWDER, FOR SOLUTION INTRAVENOUS at 05:33

## 2020-06-23 RX ADMIN — AMPICILLIN AND SULBACTAM 3 G: 2; 1 INJECTION, POWDER, FOR SOLUTION INTRAVENOUS at 18:36

## 2020-06-23 RX ADMIN — LOSARTAN POTASSIUM 50 MG: 50 TABLET, FILM COATED ORAL at 05:32

## 2020-06-23 RX ADMIN — METOPROLOL TARTRATE 25 MG: 25 TABLET, FILM COATED ORAL at 18:36

## 2020-06-23 RX ADMIN — AZITHROMYCIN MONOHYDRATE 500 MG: 250 TABLET ORAL at 05:32

## 2020-06-23 ASSESSMENT — ENCOUNTER SYMPTOMS
SORE THROAT: 0
MYALGIAS: 0
MYALGIAS: 1
WEAKNESS: 1
BACK PAIN: 0
HEMOPTYSIS: 0
DIAPHORESIS: 0
DEPRESSION: 0
SPUTUM PRODUCTION: 0
COUGH: 1
DOUBLE VISION: 0
HEADACHES: 0
NAUSEA: 0
VOMITING: 0
CHILLS: 0
ABDOMINAL PAIN: 0
SHORTNESS OF BREATH: 1
DIZZINESS: 0
DIARRHEA: 0
FEVER: 0
PALPITATIONS: 0

## 2020-06-23 NOTE — CARE PLAN
Problem: Communication  Goal: The ability to communicate needs accurately and effectively will improve  Outcome: PROGRESSING AS EXPECTED   Pt whiteboard updated on plan of care  Pt encouraged to call for assistance  Pt encouraged to voice any concerns or questions regarding care plan  Pt updated on plan of care as it develops and changes    Problem: Safety  Goal: Will remain free from injury  Outcome: PROGRESSING AS EXPECTED  Goal: Will remain free from falls  Outcome: PROGRESSING AS EXPECTED   Treaded socks in use  Call light within reach and patient calls appropriately  Medication education provided prior to administration  Medications administered as ordered  Bed alarm on and bed locked in lowest position

## 2020-06-23 NOTE — PROGRESS NOTES
Received report from day shift  RNEli. Assumed care of pt. Pt reports c/o mild back/body aches at this time. Updated pt on plan of care. Pt resting comfortably up in chair. Continuous pulse ox in place. VSS . Educated on use of call light. Hourly rounding and continuous monitoring in place.

## 2020-06-23 NOTE — RESPIRATORY CARE
COPD EDUCATION by COPD CLINICAL EDUCATOR  6/23/2020 at 9:38 AM by Rosalinda Loya, RRT     Patient reviewed by COPD education team. Patient does not have a history or diagnosis of COPD and is a non-smoker, therefore does not qualify for the COPD program.

## 2020-06-23 NOTE — PROGRESS NOTES
[facility] Medicine Daily Progress Note    Date of Service  6/23/2020    Chief Complaint  49 y.o. male admitted 6/20/2020 with fever and SOB    Hospital Course    PMHx HTN.  Presenting with cough and SOB.  outpt COVID neg on 6/15.  He does however have known + exposure to family members.  Sx's developed and worsened over 5 days. In ED temp 102 and 87% on RA.   CXR showing B patchy infiltrates.  Admitted on C3/Zithro for community acquired pneumonia and acute hypoxic resp failure      Interval Problem Update    Patient reports ongoing shortness of breath, slightly improving  Generalized weakness  Multiple family members with COVID positive, hospitalized  Patient reports poor appetite  Denies any calf pain  Consultants/Specialty  ID    Code Status  full    Disposition  Started on full dose anticoagulation, continue to monitor  TBD  Review of Systems  Review of Systems   Constitutional: Positive for malaise/fatigue. Negative for chills, diaphoresis and fever.   HENT: Negative for nosebleeds and sore throat.    Eyes: Negative for double vision.   Respiratory: Positive for cough and shortness of breath. Negative for sputum production.    Cardiovascular: Negative for chest pain, palpitations and leg swelling.   Gastrointestinal: Negative for abdominal pain, diarrhea, nausea and vomiting.   Genitourinary: Negative for dysuria and urgency.   Musculoskeletal: Negative for back pain and myalgias.   Skin: Negative for rash.   Neurological: Positive for weakness. Negative for headaches.   Psychiatric/Behavioral: Negative for depression.        Physical Exam  Temp:  [36.2 °C (97.1 °F)-37.4 °C (99.3 °F)] 36.4 °C (97.6 °F)  Pulse:  [] 87  Resp:  [18-20] 18  BP: (107-119)/(64-66) 107/64  SpO2:  [92 %-98 %] 92 %    Physical Exam  Vitals signs reviewed.   Constitutional:       General: He is not in acute distress.     Appearance: He is well-developed. He is not toxic-appearing or diaphoretic.   HENT:      Head: Normocephalic and  atraumatic.   Eyes:      Conjunctiva/sclera: Conjunctivae normal.   Neck:      Vascular: No JVD.   Cardiovascular:      Rate and Rhythm: Normal rate.      Heart sounds: No murmur. No gallop.    Pulmonary:      Effort: Pulmonary effort is normal. No respiratory distress.      Breath sounds: No stridor. No wheezing, rhonchi or rales.      Comments: Diminished breath sounds  Chest:      Chest wall: No tenderness.   Abdominal:      General: There is no distension.      Palpations: Abdomen is soft.      Tenderness: There is no rebound.   Musculoskeletal:         General: No swelling or tenderness.   Skin:     General: Skin is warm and dry.   Neurological:      General: No focal deficit present.      Mental Status: He is oriented to person, place, and time. Mental status is at baseline.      Sensory: No sensory deficit.      Motor: Weakness present.   Psychiatric:         Thought Content: Thought content normal.         Fluids    Intake/Output Summary (Last 24 hours) at 6/23/2020 1458  Last data filed at 6/23/2020 1300  Gross per 24 hour   Intake 720 ml   Output --   Net 720 ml       Laboratory  Recent Labs     06/20/20 2321 06/22/20  1211 06/23/20  0321   WBC 5.9 4.9 5.1   RBC 5.58 5.15 4.85   HEMOGLOBIN 15.2 13.9* 13.1*   HEMATOCRIT 46.3 43.7 40.8*   MCV 83.0 84.9 84.1   MCH 27.2 27.0 27.0   MCHC 32.8* 31.8* 32.1*   RDW 41.6 42.5 42.7   PLATELETCT 164 184 168   MPV 9.4 10.0 9.7     Recent Labs     06/21/20  0339 06/22/20  1211 06/23/20  0321   SODIUM 129* 136 135   POTASSIUM 4.1 4.1 4.0   CHLORIDE 99 100 100   CO2 16* 23 21   GLUCOSE 109* 112* 113*   BUN 17 18 21   CREATININE 0.99 1.14 1.06   CALCIUM 8.2* 8.4* 8.3*     Recent Labs     06/20/20  2321 06/23/20  0321   APTT 32.3 27.8   INR 0.88 1.01               Imaging  DX-CHEST-PORTABLE (1 VIEW)   Final Result         Patchy bilateral lower lung opacities, atelectasis or multifocal pneumonia.           Assessment/Plan  * Pneumonia due to COVID-19 virus  Assessment &  Plan  DC tele  Wean 02 as tolerated  Cont with empiric IV abx   Follow up covid 19 labs ordered   Blood cx neg  Remdesivir D2  S/p convalescent plasma 6/21  ID following   Clinically is improved    6/23 DDimer>1: increaseing crp, change to full dose anticoagulation      Reactive airway disease with acute exacerbation  Assessment & Plan  Pt states he only has sx's with exercise or with his allergies.  Start scheduled BD therapy  Holding on steroids but will have a low thershold to initiate if any worsening of his COVID disease or worsening of his resp exam    Abnormal LFTs  Assessment & Plan  Mild, likely due to covid   Cont ot trend    Hyponatremia  Assessment & Plan  Mild, cont to trend    Acute hypoxemic respiratory failure (HCC)  Assessment & Plan  Wean 02 as tolerated  COVID +, Hx reactive airway disease, possible bacterial superinfection    BMI 40.0-44.9, adult (HCC)- (present on admission)  Assessment & Plan  Encourage weight loss    HTN (hypertension), benign- (present on admission)  Assessment & Plan  Resume home CCB, BB and ARB with parameters       VTE prophylaxis: enoxaparin

## 2020-06-23 NOTE — PROGRESS NOTES
Infectious Disease Progress Note    Author: Yolis Lazo M.D. Date & Time of service: 2020  8:59 AM    Chief Complaint:  Follow-up for COVID-19 pneumonia    Interval History:   T-max 99.5 no CBC done today.  Fever curve slightly improved.  Down to 4 L nasal cannula.  Received convalescent plasma and first dose of remdesivir yesterday and tolerated them.  Feels as if his fevers are improving.  Still struggles with shortness of breath   afebrile WBC 5.1, oxygen requirements stable.  States his fevers have resolved.  He continues to have shortness of breath with minimal exertion.  His nonproductive cough is also more consistent.  No diarrhea.    Labs Reviewed and Medications Reviewed.    Review of Systems:  Review of Systems   Constitutional: Positive for malaise/fatigue. Negative for diaphoresis and fever.   Respiratory: Positive for cough and shortness of breath. Negative for hemoptysis.         With minimal exertion   Cardiovascular: Negative for chest pain.   Gastrointestinal: Negative for abdominal pain, diarrhea, nausea and vomiting.   Musculoskeletal: Positive for myalgias.   Neurological: Negative for dizziness and headaches.       Hemodynamics:  Temp (24hrs), Av.8 °C (98.2 °F), Min:36.2 °C (97.1 °F), Max:37.4 °C (99.3 °F)  Temperature: 36.4 °C (97.6 °F)  Pulse  Av  Min: 71  Max: 107   Blood Pressure: 107/64       Physical Exam:  Physical Exam  Constitutional:       Appearance: He is obese. He is ill-appearing.   HENT:      Mouth/Throat:      Mouth: Mucous membranes are moist.      Pharynx: No oropharyngeal exudate.   Eyes:      Extraocular Movements: Extraocular movements intact.      Conjunctiva/sclera: Conjunctivae normal.      Pupils: Pupils are equal, round, and reactive to light.   Neck:      Musculoskeletal: Normal range of motion and neck supple.   Cardiovascular:      Rate and Rhythm: Regular rhythm. Tachycardia present.   Pulmonary:      Effort: Pulmonary effort is normal.    Abdominal:      Palpations: Abdomen is soft.      Tenderness: There is no abdominal tenderness.   Musculoskeletal: Normal range of motion.   Skin:     General: Skin is warm and dry.   Neurological:      General: No focal deficit present.      Mental Status: He is alert and oriented to person, place, and time.      Cranial Nerves: No cranial nerve deficit.   Psychiatric:         Mood and Affect: Mood normal.         Behavior: Behavior normal.         Meds:    Current Facility-Administered Medications:   •  ondansetron  •  ondansetron  •  enoxaparin  •  amLODIPine  •  losartan  •  metoprolol  •  acetaminophen  •  albuterol  •  [COMPLETED] Remdesivir in NS IVPB **FOLLOWED BY** Remdesivir in NS IVPB  •  ampicillin-sulbactam (UNASYN) IV    Labs:  Recent Labs     06/20/20 2321 06/22/20 1211 06/23/20  0321   WBC 5.9 4.9 5.1   RBC 5.58 5.15 4.85   HEMOGLOBIN 15.2 13.9* 13.1*   HEMATOCRIT 46.3 43.7 40.8*   MCV 83.0 84.9 84.1   MCH 27.2 27.0 27.0   RDW 41.6 42.5 42.7   PLATELETCT 164 184 168   MPV 9.4 10.0 9.7   NEUTSPOLYS 64.60 61.20 68.10   LYMPHOCYTES 25.80 28.00 22.20   MONOCYTES 8.50 9.80 8.90   EOSINOPHILS 0.30 0.20 0.00   BASOPHILS 0.30 0.40 0.20     Recent Labs     06/20/20 2321 06/21/20 0339 06/22/20 1211 06/23/20  0321   SODIUM 134* 129* 136 135   POTASSIUM 3.9 4.1 4.1 4.0   CHLORIDE 98 99 100 100   CO2 20 16* 23 21   GLUCOSE 110* 109* 112* 113*   BUN 20 17 18 21   CPKTOTAL 556*  --   --  823*     Recent Labs     06/21/20 0339 06/22/20 1211 06/23/20  0321   ALBUMIN 3.3 3.9 3.6   TBILIRUBIN 0.2 0.3 0.4   ALKPHOSPHAT 42 45 41   TOTPROTEIN 7.0 7.2 6.8   ALTSGPT 47 45 38   ASTSGOT 49* 45 43   CREATININE 0.99 1.14 1.06       Imaging:  Dx-chest-portable (1 View)    Result Date: 6/20/2020 6/20/2020 11:28 PM HISTORY/REASON FOR EXAM:  Shortness of Breath Fever. Suspect possible Covid-19 exposure rule out TECHNIQUE/EXAM DESCRIPTION AND NUMBER OF VIEWS: Single portable view of the chest. COMPARISON: None FINDINGS:  "Patchy bilateral lower lung opacities No pleural effusion. No pneumothorax. Normal cardiopericardial silhouette.     Patchy bilateral lower lung opacities, atelectasis or multifocal pneumonia.      Micro:  Results     Procedure Component Value Units Date/Time    Blood Culture [[id number]] Collected:  06/20/20 2321    Order Status:  Completed Specimen:  Blood from Peripheral Updated:  06/22/20 0726     Significant Indicator NEG     Source BLD     Site PERIPHERAL     Culture Result No Growth  Note: Blood cultures are incubated for 5 days and  are monitored continuously.Positive blood cultures  are called to the RN and reported as soon as  they are identified.      Narrative:       Droplet, Contact, and Eye Protection  2 of 2 blood culture x2  Sites order. Per Hospital Policy:  Only change Specimen Src: to \"Line\" if specified by physician  order.  No site indicated    Blood Culture [[id number]] Collected:  06/20/20 2321    Order Status:  Completed Specimen:  Blood from Peripheral Updated:  06/22/20 0726     Significant Indicator NEG     Source BLD     Site PERIPHERAL     Culture Result No Growth  Note: Blood cultures are incubated for 5 days and  are monitored continuously.Positive blood cultures  are called to the RN and reported as soon as  they are identified.      Narrative:       Droplet, Contact, and Eye Protection  1 of 2 for Blood Culture x 2 sites order. Per Hospital  Policy: Only change Specimen Src: to \"Line\" if specified by  physician order.  No site indicated    SARS-CoV-2, PCR (In-House) [[id number]]  (Abnormal) Collected:  06/20/20 2325    Order Status:  Completed Updated:  06/21/20 0041     SARS-CoV-2 Source NP Swab     SARS-CoV-2 by PCR DETECTED     Comment: **The [employer] GeneXpert Xpress SARS-CoV-2 Test has been made available for  use under the Emergency Use Authorization (EUA) only.         Narrative:       Droplet, Contact, and Eye Protection  Rule-out COVID-19 panel, includes " droplet/contact/eye  isolation order.  Check influenza to order this test only if  specifically indicated.  Expected Turn around time?->Rush (Cepheid 2-4 hours)    COVID/SARS CoV-2 PCR [[id number]] Collected:  06/20/20 2325    Order Status:  Completed Specimen:  Respirate from Nasopharyngeal Updated:  06/20/20 2333     COVID Order Status Received    Narrative:       Droplet, Contact, and Eye Protection  Rule-out COVID-19 panel, includes droplet/contact/eye  isolation order.  Check influenza to order this test only if  specifically indicated.  Expected Turn around time?->Rush (Cepheid 2-4 hours)          Assessment:  Active Hospital Problems    Diagnosis   • *Pneumonia due to COVID-19 virus [U07.1, J12.89]   • Acute hypoxemic respiratory failure (HCC) [J96.01]   • Hyponatremia [E87.1]   • Abnormal LFTs [R94.5]   • Reactive airway disease with acute exacerbation [J45.901]   • HTN (hypertension), benign [I10]   • BMI 40.0-44.9, adult (HCC) [Z68.41]       Plan:  Acute hypoxic respiratory failure  COVID-19 pneumonia  Fevers improved  on 4 L nasal cannula-no changes    -Continue supportive care.  Self proning, oxygen supplementation, judicious use of IV fluids  -Continue IV remdesivir 200mg x1, followed by 100 mg daily for a 5-day course.  Stop date 6/26/2020  -Monitor LFTs.  Normal   -Convalescent plasma given on 6/21/2020  -Follow blood cultures from 6/20, no growth till date  -Agree with 5-day course of empiric antibiotics given elevated procalcitonin.  -Recommend therapeutic anticoagulation given elevated d-dimer and increased risk for thromboembolic disease in COVID-19    -Inflammatory markers:  CRP: 4.14--> 4.27  Ferritin:3152--> 3132  LDH: 370--> 401  D-dimer: 0.75--> 1.10  Procalcitonin: 0.41  IL-6: Pending    Will recheck above labs and monitor every 48 hours     Hypertension    Discussed with internal medicine/Dr. Hull

## 2020-06-24 ENCOUNTER — APPOINTMENT (OUTPATIENT)
Dept: RADIOLOGY | Facility: MEDICAL CENTER | Age: 50
DRG: 177 | End: 2020-06-24
Attending: INTERNAL MEDICINE
Payer: COMMERCIAL

## 2020-06-24 LAB
ALBUMIN SERPL BCP-MCNC: 3.4 G/DL (ref 3.2–4.9)
ALBUMIN/GLOB SERPL: 1.1 G/DL
ALP SERPL-CCNC: 42 U/L (ref 30–99)
ALT SERPL-CCNC: 41 U/L (ref 2–50)
ANION GAP SERPL CALC-SCNC: 11 MMOL/L (ref 7–16)
AST SERPL-CCNC: 51 U/L (ref 12–45)
BARCODED ABORH UBTYP: 7300
BARCODED ABORH UBTYP: 7300
BARCODED PRD CODE UBPRD: NORMAL
BARCODED PRD CODE UBPRD: NORMAL
BARCODED UNIT NUM UBUNT: NORMAL
BARCODED UNIT NUM UBUNT: NORMAL
BASE EXCESS BLDA CALC-SCNC: -1 MMOL/L (ref -4–3)
BASOPHILS # BLD AUTO: 0.2 % (ref 0–1.8)
BASOPHILS # BLD: 0.01 K/UL (ref 0–0.12)
BILIRUB SERPL-MCNC: 0.4 MG/DL (ref 0.1–1.5)
BODY TEMPERATURE: 36.5 CENTIGRADE
BUN SERPL-MCNC: 18 MG/DL (ref 8–22)
CALCIUM SERPL-MCNC: 8.1 MG/DL (ref 8.5–10.5)
CHLORIDE SERPL-SCNC: 102 MMOL/L (ref 96–112)
CO2 SERPL-SCNC: 22 MMOL/L (ref 20–33)
COMPONENT F 8504F: NORMAL
COMPONENT F 8504F: NORMAL
CREAT SERPL-MCNC: 0.84 MG/DL (ref 0.5–1.4)
EKG IMPRESSION: NORMAL
EOSINOPHIL # BLD AUTO: 0.02 K/UL (ref 0–0.51)
EOSINOPHIL NFR BLD: 0.4 % (ref 0–6.9)
ERYTHROCYTE [DISTWIDTH] IN BLOOD BY AUTOMATED COUNT: 42.2 FL (ref 35.9–50)
GLOBULIN SER CALC-MCNC: 3.1 G/DL (ref 1.9–3.5)
GLUCOSE SERPL-MCNC: 112 MG/DL (ref 65–99)
HCO3 BLDA-SCNC: 23 MMOL/L (ref 17–25)
HCT VFR BLD AUTO: 38.4 % (ref 42–52)
HGB BLD-MCNC: 13 G/DL (ref 14–18)
IMM GRANULOCYTES # BLD AUTO: 0.04 K/UL (ref 0–0.11)
IMM GRANULOCYTES NFR BLD AUTO: 0.8 % (ref 0–0.9)
LYMPHOCYTES # BLD AUTO: 1.11 K/UL (ref 1–4.8)
LYMPHOCYTES NFR BLD: 23.5 % (ref 22–41)
MCH RBC QN AUTO: 28.4 PG (ref 27–33)
MCHC RBC AUTO-ENTMCNC: 33.9 G/DL (ref 33.7–35.3)
MCV RBC AUTO: 84 FL (ref 81.4–97.8)
MONOCYTES # BLD AUTO: 0.38 K/UL (ref 0–0.85)
MONOCYTES NFR BLD AUTO: 8.1 % (ref 0–13.4)
NEUTROPHILS # BLD AUTO: 3.16 K/UL (ref 1.82–7.42)
NEUTROPHILS NFR BLD: 67 % (ref 44–72)
NRBC # BLD AUTO: 0 K/UL
NRBC BLD-RTO: 0 /100 WBC
PCO2 BLDA: 38.8 MMHG (ref 26–37)
PCO2 TEMP ADJ BLDA: 38 MMHG (ref 26–37)
PH BLDA: 7.4 [PH] (ref 7.4–7.5)
PH TEMP ADJ BLDA: 7.41 [PH] (ref 7.4–7.5)
PLATELET # BLD AUTO: 188 K/UL (ref 164–446)
PMV BLD AUTO: 10.4 FL (ref 9–12.9)
PO2 BLDA: 20.7 MMHG (ref 64–87)
PO2 TEMP ADJ BLDA: 20 MMHG (ref 64–87)
POTASSIUM SERPL-SCNC: 4 MMOL/L (ref 3.6–5.5)
PRODUCT TYPE UPROD: NORMAL
PRODUCT TYPE UPROD: NORMAL
PROT SERPL-MCNC: 6.5 G/DL (ref 6–8.2)
RBC # BLD AUTO: 4.57 M/UL (ref 4.7–6.1)
SAO2 % BLDA: 31.1 % (ref 93–99)
SODIUM SERPL-SCNC: 135 MMOL/L (ref 135–145)
UNIT STATUS USTAT: NORMAL
UNIT STATUS USTAT: NORMAL
WBC # BLD AUTO: 4.7 K/UL (ref 4.8–10.8)

## 2020-06-24 PROCEDURE — 99253 IP/OBS CNSLTJ NEW/EST LOW 45: CPT | Mod: CS | Performed by: INTERNAL MEDICINE

## 2020-06-24 PROCEDURE — 700102 HCHG RX REV CODE 250 W/ 637 OVERRIDE(OP): Performed by: INTERNAL MEDICINE

## 2020-06-24 PROCEDURE — 85025 COMPLETE CBC W/AUTO DIFF WBC: CPT

## 2020-06-24 PROCEDURE — 94640 AIRWAY INHALATION TREATMENT: CPT

## 2020-06-24 PROCEDURE — 700102 HCHG RX REV CODE 250 W/ 637 OVERRIDE(OP): Performed by: HOSPITALIST

## 2020-06-24 PROCEDURE — 770021 HCHG ROOM/CARE - ISO PRIVATE

## 2020-06-24 PROCEDURE — 82803 BLOOD GASES ANY COMBINATION: CPT

## 2020-06-24 PROCEDURE — A9270 NON-COVERED ITEM OR SERVICE: HCPCS | Performed by: INTERNAL MEDICINE

## 2020-06-24 PROCEDURE — A9270 NON-COVERED ITEM OR SERVICE: HCPCS | Performed by: HOSPITALIST

## 2020-06-24 PROCEDURE — 36415 COLL VENOUS BLD VENIPUNCTURE: CPT

## 2020-06-24 PROCEDURE — 93005 ELECTROCARDIOGRAM TRACING: CPT | Performed by: INTERNAL MEDICINE

## 2020-06-24 PROCEDURE — 99233 SBSQ HOSP IP/OBS HIGH 50: CPT | Mod: CS | Performed by: INTERNAL MEDICINE

## 2020-06-24 PROCEDURE — 36430 TRANSFUSION BLD/BLD COMPNT: CPT

## 2020-06-24 PROCEDURE — 94760 N-INVAS EAR/PLS OXIMETRY 1: CPT

## 2020-06-24 PROCEDURE — 700111 HCHG RX REV CODE 636 W/ 250 OVERRIDE (IP): Performed by: INTERNAL MEDICINE

## 2020-06-24 PROCEDURE — 80053 COMPREHEN METABOLIC PANEL: CPT

## 2020-06-24 PROCEDURE — P9017 PLASMA 1 DONOR FRZ W/IN 8 HR: HCPCS

## 2020-06-24 PROCEDURE — 93010 ELECTROCARDIOGRAM REPORT: CPT | Performed by: INTERNAL MEDICINE

## 2020-06-24 PROCEDURE — 700105 HCHG RX REV CODE 258: Performed by: INTERNAL MEDICINE

## 2020-06-24 RX ORDER — DEXAMETHASONE 6 MG/1
6 TABLET ORAL DAILY
Status: DISCONTINUED | OUTPATIENT
Start: 2020-06-24 | End: 2020-07-01 | Stop reason: HOSPADM

## 2020-06-24 RX ADMIN — AMPICILLIN AND SULBACTAM 3 G: 2; 1 INJECTION, POWDER, FOR SOLUTION INTRAVENOUS at 05:32

## 2020-06-24 RX ADMIN — AMPICILLIN AND SULBACTAM 3 G: 2; 1 INJECTION, POWDER, FOR SOLUTION INTRAVENOUS at 16:37

## 2020-06-24 RX ADMIN — ENOXAPARIN SODIUM 120 MG: 120 INJECTION SUBCUTANEOUS at 05:32

## 2020-06-24 RX ADMIN — AMPICILLIN AND SULBACTAM 3 G: 2; 1 INJECTION, POWDER, FOR SOLUTION INTRAVENOUS at 12:01

## 2020-06-24 RX ADMIN — DEXAMETHASONE 6 MG: 6 TABLET ORAL at 16:39

## 2020-06-24 RX ADMIN — LOSARTAN POTASSIUM 50 MG: 50 TABLET, FILM COATED ORAL at 05:32

## 2020-06-24 RX ADMIN — ALBUTEROL SULFATE 2 PUFF: 90 AEROSOL, METERED RESPIRATORY (INHALATION) at 09:40

## 2020-06-24 RX ADMIN — METOPROLOL TARTRATE 25 MG: 25 TABLET, FILM COATED ORAL at 16:42

## 2020-06-24 RX ADMIN — REMDESIVIR 100 MG: 5 INJECTION INTRAVENOUS at 13:18

## 2020-06-24 RX ADMIN — ENOXAPARIN SODIUM 120 MG: 120 INJECTION SUBCUTANEOUS at 16:44

## 2020-06-24 RX ADMIN — METOPROLOL TARTRATE 25 MG: 25 TABLET, FILM COATED ORAL at 05:32

## 2020-06-24 ASSESSMENT — ENCOUNTER SYMPTOMS
DIZZINESS: 0
HEADACHES: 0
NERVOUS/ANXIOUS: 0
CHILLS: 0
PALPITATIONS: 1
SPUTUM PRODUCTION: 0
HEMOPTYSIS: 0
VOMITING: 0
DEPRESSION: 0
ABDOMINAL PAIN: 0
FEVER: 0
WEAKNESS: 1
COUGH: 1
NAUSEA: 0
PSYCHIATRIC NEGATIVE: 1
EYES NEGATIVE: 1
NEUROLOGICAL NEGATIVE: 1
GASTROINTESTINAL NEGATIVE: 1
DIARRHEA: 0
MYALGIAS: 1
BACK PAIN: 0
PALPITATIONS: 0
MYALGIAS: 0
SHORTNESS OF BREATH: 1
DIAPHORESIS: 0

## 2020-06-24 NOTE — PROGRESS NOTES
Pedrito from Lab called with critical result of PO2 of 20.7 and temp adjusted = 20 at 1250. Critical lab result read back to Pedrito.   Dr. Hull paged. Waiting for call back.    Dr. Hull returned call at 1258 - dr. Hull had seen abg result   cta ordered

## 2020-06-24 NOTE — DIETARY
Nutrition Services: Update   Day 3 of admit.  Jim Neves is a 49 y.o. male with admitting DX of Acute hypoxemic respiratory failure.    Pt is on a regular diet, where PO intake varies from <25%-75%; average ~25-50%.  To ensure nutrition optimization in the setting of COVID19, additional snacks and oral nutrition supplements reviewed.  RD will continue to monitor for adequate intake.     -Clinical picture reviewed.  Remains on 5L nasal cannula without changes.  -Energy noted to be improving but poor appetite remains.  +Generalized weakness.  No significant GI distress noted.  convalescent plasma given 6/21.    -Labs: Glucose: 112, AST: 51.  6/23 CRP 4/27 w/ slight increase from previous.  6/23  increased from 370 taken on 6/20.    -Meds: Remdesivir.  Other meds reviewed.  -LBM: 6/23 - large/soft.    Malnutrition risk:  Pt does not meet malnutrition criteria per ASPEN Guidelines at this time however will continue to monitor.     Recommendations/Plan:  1. Boost Plus 1x/day, Magic Cups 1x/day.   2. Encourage intake of meals and supplements.  Continue to document % consumed under ADLs.   3. Monitor weight - continue to obtain wts to monitor trends.     RD follows

## 2020-06-24 NOTE — PROGRESS NOTES
Infectious Disease Progress Note    Author: Yolis Lazo M.D. Date & Time of service: 2020  8:53 AM    Chief Complaint:  Follow-up for COVID-19 pneumonia    Interval History:   T-max 99.5 no CBC done today.  Fever curve slightly improved.  Down to 4 L nasal cannula.  Received convalescent plasma and first dose of remdesivir yesterday and tolerated them.  Feels as if his fevers are improving.  Still struggles with shortness of breath   afebrile WBC 5.1, oxygen requirements stable.  States his fevers have resolved.  He continues to have shortness of breath with minimal exertion.  His nonproductive cough is also more consistent.  No diarrhea.   afebrile WBC 4.7.  Remains on 5 L nasal cannula without any changes. Breathing slowly improving and he states he has more energy. Poor appetite      Labs Reviewed and Medications Reviewed.    Review of Systems:  Review of Systems   Constitutional: Positive for malaise/fatigue. Negative for diaphoresis and fever.   Respiratory: Positive for cough and shortness of breath. Negative for hemoptysis.         Slowly improving   Cardiovascular: Negative for chest pain.   Gastrointestinal: Negative for abdominal pain, diarrhea, nausea and vomiting.   Musculoskeletal: Positive for myalgias.   Neurological: Negative for dizziness and headaches.       Hemodynamics:  Temp (24hrs), Av.2 °C (98.9 °F), Min:36.6 °C (97.9 °F), Max:37.8 °C (100.1 °F)  Temperature: 37.1 °C (98.8 °F)  Pulse  Av.4  Min: 71  Max: 107   Blood Pressure: 134/68       Physical Exam:  Physical Exam  Constitutional:       Appearance: He is obese. He is ill-appearing.   HENT:      Mouth/Throat:      Mouth: Mucous membranes are moist.      Pharynx: No oropharyngeal exudate.   Eyes:      Extraocular Movements: Extraocular movements intact.      Conjunctiva/sclera: Conjunctivae normal.      Pupils: Pupils are equal, round, and reactive to light.   Neck:      Musculoskeletal: Normal range of  motion and neck supple.   Cardiovascular:      Rate and Rhythm: Regular rhythm. Tachycardia present.   Pulmonary:      Effort: Pulmonary effort is normal.   Abdominal:      Palpations: Abdomen is soft.      Tenderness: There is no abdominal tenderness.   Musculoskeletal: Normal range of motion.   Skin:     General: Skin is warm and dry.   Neurological:      General: No focal deficit present.      Mental Status: He is alert and oriented to person, place, and time.      Cranial Nerves: No cranial nerve deficit.   Psychiatric:         Mood and Affect: Mood normal.         Behavior: Behavior normal.         Meds:    Current Facility-Administered Medications:   •  enoxaparin (LOVENOX) injection  •  ondansetron  •  ondansetron  •  amLODIPine  •  losartan  •  metoprolol  •  acetaminophen  •  albuterol  •  [COMPLETED] Remdesivir in NS IVPB **FOLLOWED BY** Remdesivir in NS IVPB  •  ampicillin-sulbactam (UNASYN) IV    Labs:  Recent Labs     06/22/20 1211 06/23/20 0321 06/24/20 0224   WBC 4.9 5.1 4.7*   RBC 5.15 4.85 4.57*   HEMOGLOBIN 13.9* 13.1* 13.0*   HEMATOCRIT 43.7 40.8* 38.4*   MCV 84.9 84.1 84.0   MCH 27.0 27.0 28.4   RDW 42.5 42.7 42.2   PLATELETCT 184 168 188   MPV 10.0 9.7 10.4   NEUTSPOLYS 61.20 68.10 67.00   LYMPHOCYTES 28.00 22.20 23.50   MONOCYTES 9.80 8.90 8.10   EOSINOPHILS 0.20 0.00 0.40   BASOPHILS 0.40 0.20 0.20     Recent Labs     06/22/20  1211 06/23/20 0321 06/24/20 0224   SODIUM 136 135 135   POTASSIUM 4.1 4.0 4.0   CHLORIDE 100 100 102   CO2 23 21 22   GLUCOSE 112* 113* 112*   BUN 18 21 18   CPKTOTAL  --  823*  --      Recent Labs     06/22/20 1211 06/23/20 0321 06/24/20 0224   ALBUMIN 3.9 3.6 3.4   TBILIRUBIN 0.3 0.4 0.4   ALKPHOSPHAT 45 41 42   TOTPROTEIN 7.2 6.8 6.5   ALTSGPT 45 38 41   ASTSGOT 45 43 51*   CREATININE 1.14 1.06 0.84       Imaging:  Dx-chest-portable (1 View)    Result Date: 6/20/2020 6/20/2020 11:28 PM HISTORY/REASON FOR EXAM:  Shortness of Breath Fever. Suspect possible  "Covid-19 exposure rule out TECHNIQUE/EXAM DESCRIPTION AND NUMBER OF VIEWS: Single portable view of the chest. COMPARISON: None FINDINGS: Patchy bilateral lower lung opacities No pleural effusion. No pneumothorax. Normal cardiopericardial silhouette.     Patchy bilateral lower lung opacities, atelectasis or multifocal pneumonia.      Micro:  Results     Procedure Component Value Units Date/Time    Blood Culture [[id number]] Collected:  06/20/20 2321    Order Status:  Completed Specimen:  Blood from Peripheral Updated:  06/22/20 0726     Significant Indicator NEG     Source BLD     Site PERIPHERAL     Culture Result No Growth  Note: Blood cultures are incubated for 5 days and  are monitored continuously.Positive blood cultures  are called to the RN and reported as soon as  they are identified.      Narrative:       Droplet, Contact, and Eye Protection  2 of 2 blood culture x2  Sites order. Per Hospital Policy:  Only change Specimen Src: to \"Line\" if specified by physician  order.  No site indicated    Blood Culture [[id number]] Collected:  06/20/20 2321    Order Status:  Completed Specimen:  Blood from Peripheral Updated:  06/22/20 0726     Significant Indicator NEG     Source BLD     Site PERIPHERAL     Culture Result No Growth  Note: Blood cultures are incubated for 5 days and  are monitored continuously.Positive blood cultures  are called to the RN and reported as soon as  they are identified.      Narrative:       Droplet, Contact, and Eye Protection  1 of 2 for Blood Culture x 2 sites order. Per Hospital  Policy: Only change Specimen Src: to \"Line\" if specified by  physician order.  No site indicated    SARS-CoV-2, PCR (In-House) [[id number]]  (Abnormal) Collected:  06/20/20 2325    Order Status:  Completed Updated:  06/21/20 0041     SARS-CoV-2 Source NP Swab     SARS-CoV-2 by PCR DETECTED     Comment: **The [employer] GeneXpert Xpress SARS-CoV-2 Test has been made available for  use under the Emergency Use " Authorization (EUA) only.         Narrative:       Droplet, Contact, and Eye Protection  Rule-out COVID-19 panel, includes droplet/contact/eye  isolation order.  Check influenza to order this test only if  specifically indicated.  Expected Turn around time?->Rush (Cepheid 2-4 hours)    COVID/SARS CoV-2 PCR [[id number]] Collected:  06/20/20 2325    Order Status:  Completed Specimen:  Respirate from Nasopharyngeal Updated:  06/20/20 2333     COVID Order Status Received    Narrative:       Droplet, Contact, and Eye Protection  Rule-out COVID-19 panel, includes droplet/contact/eye  isolation order.  Check influenza to order this test only if  specifically indicated.  Expected Turn around time?->Rush (Cepheid 2-4 hours)          Assessment:  Active Hospital Problems    Diagnosis   • *Pneumonia due to COVID-19 virus [U07.1, J12.89]   • Acute hypoxemic respiratory failure (HCC) [J96.01]   • Hyponatremia [E87.1]   • Abnormal LFTs [R94.5]   • Reactive airway disease with acute exacerbation [J45.901]   • HTN (hypertension), benign [I10]   • BMI 40.0-44.9, adult (HCC) [Z68.41]       Plan:  Acute hypoxic respiratory failure  COVID-19 pneumonia  Fevers improved  On 4-5 L nasal cannula-no changes    -Continue supportive care.  Self proning, oxygen supplementation, judicious use of IV fluids  -Continue IV remdesivir 200mg x1, followed by 100 mg daily for a 5-day course.  Stop date 6/26/2020  -Monitor LFTs.  AST mildly elevated today  -Convalescent plasma given on 6/21/2020  -Follow blood cultures from 6/20, no growth till date  -Agree with 5-day course of empiric antibiotics given elevated procalcitonin. Stop date 06/25/2020  -Continue therapeutic anticoagulation given elevated d-dimer and increased risk for thromboembolic disease in COVID-19    -Inflammatory markers:  CRP: 4.14--> 4.27  Ferritin:3152--> 3132  LDH: 370--> 401  D-dimer: 0.75--> 1.10  Procalcitonin: 0.41  IL-6: < 2.0    Will recheck above labs and monitor every 48  hours     Hypertension    I have performed a physical exam and reviewed and updated ROS and plan today 6/24/2020.  In review of yesterday's note 6/23/2020, there are no changes except as documented above.      Discussed with internal medicine/Dr. Hull    ADDENDUM:  Patient evaluated by pulmonary today given ABG P02 of 20. Plan for CTA chest. Okayfrom ID standpoint to start dexamethasone per pulmonary recommendations. Will also give a 2nd unit of convalescent plasma when it arrives.      Discussed with Dr. Xiong and Dr. Bautista, pulmonary

## 2020-06-24 NOTE — PROGRESS NOTES
Infectious Disease Progress Note    Author: Yolis Lazo M.D. Date & Time of service: 2020  2:29 PM    Chief Complaint:  Follow-up for COVID-19 pneumonia    Interval History:   T-max 99.5 no CBC done today.  Fever curve slightly improved.  Down to 4 L nasal cannula.  Received convalescent plasma and first dose of remdesivir yesterday and tolerated them.  Feels as if his fevers are improving.  Still struggles with shortness of breath   afebrile WBC 5.1, oxygen requirements stable.  States his fevers have resolved.  He continues to have shortness of breath with minimal exertion.  His nonproductive cough is also more consistent.  No diarrhea.   afebrile WBC 4.7.  Remains on 5 L nasal cannula without any changes. Breathing slowly improving and he states he has more energy. Poor appetite      Labs Reviewed and Medications Reviewed.    Review of Systems:  Review of Systems   Constitutional: Positive for malaise/fatigue. Negative for diaphoresis and fever.   Respiratory: Positive for cough and shortness of breath. Negative for hemoptysis.         Slowly improving   Cardiovascular: Negative for chest pain.   Gastrointestinal: Negative for abdominal pain, diarrhea, nausea and vomiting.   Musculoskeletal: Positive for myalgias.   Neurological: Negative for dizziness and headaches.       Hemodynamics:  Temp (24hrs), Av.2 °C (98.9 °F), Min:36.6 °C (97.9 °F), Max:37.8 °C (100.1 °F)  Temperature: 37.1 °C (98.8 °F)  Pulse  Av.5  Min: 71  Max: 107   Blood Pressure: 134/68       Physical Exam:  Physical Exam  Constitutional:       Appearance: He is obese. He is ill-appearing.   HENT:      Mouth/Throat:      Mouth: Mucous membranes are moist.      Pharynx: No oropharyngeal exudate.   Eyes:      Extraocular Movements: Extraocular movements intact.      Conjunctiva/sclera: Conjunctivae normal.      Pupils: Pupils are equal, round, and reactive to light.   Neck:      Musculoskeletal: Normal range of  motion and neck supple.   Cardiovascular:      Rate and Rhythm: Regular rhythm. Tachycardia present.   Pulmonary:      Effort: Pulmonary effort is normal.   Abdominal:      Palpations: Abdomen is soft.      Tenderness: There is no abdominal tenderness.   Musculoskeletal: Normal range of motion.   Skin:     General: Skin is warm and dry.   Neurological:      General: No focal deficit present.      Mental Status: He is alert and oriented to person, place, and time.      Cranial Nerves: No cranial nerve deficit.   Psychiatric:         Mood and Affect: Mood normal.         Behavior: Behavior normal.         Meds:    Current Facility-Administered Medications:   •  enoxaparin (LOVENOX) injection  •  ondansetron  •  ondansetron  •  amLODIPine  •  losartan  •  metoprolol  •  acetaminophen  •  albuterol  •  [COMPLETED] Remdesivir in NS IVPB **FOLLOWED BY** Remdesivir in NS IVPB  •  ampicillin-sulbactam (UNASYN) IV    Labs:  Recent Labs     06/22/20 1211 06/23/20 0321 06/24/20 0224   WBC 4.9 5.1 4.7*   RBC 5.15 4.85 4.57*   HEMOGLOBIN 13.9* 13.1* 13.0*   HEMATOCRIT 43.7 40.8* 38.4*   MCV 84.9 84.1 84.0   MCH 27.0 27.0 28.4   RDW 42.5 42.7 42.2   PLATELETCT 184 168 188   MPV 10.0 9.7 10.4   NEUTSPOLYS 61.20 68.10 67.00   LYMPHOCYTES 28.00 22.20 23.50   MONOCYTES 9.80 8.90 8.10   EOSINOPHILS 0.20 0.00 0.40   BASOPHILS 0.40 0.20 0.20     Recent Labs     06/22/20  1211 06/23/20 0321 06/24/20 0224   SODIUM 136 135 135   POTASSIUM 4.1 4.0 4.0   CHLORIDE 100 100 102   CO2 23 21 22   GLUCOSE 112* 113* 112*   BUN 18 21 18   CPKTOTAL  --  823*  --      Recent Labs     06/22/20 1211 06/23/20 0321 06/24/20 0224   ALBUMIN 3.9 3.6 3.4   TBILIRUBIN 0.3 0.4 0.4   ALKPHOSPHAT 45 41 42   TOTPROTEIN 7.2 6.8 6.5   ALTSGPT 45 38 41   ASTSGOT 45 43 51*   CREATININE 1.14 1.06 0.84       Imaging:  Dx-chest-portable (1 View)    Result Date: 6/20/2020 6/20/2020 11:28 PM HISTORY/REASON FOR EXAM:  Shortness of Breath Fever. Suspect possible  "Covid-19 exposure rule out TECHNIQUE/EXAM DESCRIPTION AND NUMBER OF VIEWS: Single portable view of the chest. COMPARISON: None FINDINGS: Patchy bilateral lower lung opacities No pleural effusion. No pneumothorax. Normal cardiopericardial silhouette.     Patchy bilateral lower lung opacities, atelectasis or multifocal pneumonia.      Micro:  Results     Procedure Component Value Units Date/Time    Blood Culture [[id number]] Collected:  06/20/20 2321    Order Status:  Completed Specimen:  Blood from Peripheral Updated:  06/22/20 0726     Significant Indicator NEG     Source BLD     Site PERIPHERAL     Culture Result No Growth  Note: Blood cultures are incubated for 5 days and  are monitored continuously.Positive blood cultures  are called to the RN and reported as soon as  they are identified.      Narrative:       Droplet, Contact, and Eye Protection  2 of 2 blood culture x2  Sites order. Per Hospital Policy:  Only change Specimen Src: to \"Line\" if specified by physician  order.  No site indicated    Blood Culture [[id number]] Collected:  06/20/20 2321    Order Status:  Completed Specimen:  Blood from Peripheral Updated:  06/22/20 0726     Significant Indicator NEG     Source BLD     Site PERIPHERAL     Culture Result No Growth  Note: Blood cultures are incubated for 5 days and  are monitored continuously.Positive blood cultures  are called to the RN and reported as soon as  they are identified.      Narrative:       Droplet, Contact, and Eye Protection  1 of 2 for Blood Culture x 2 sites order. Per Hospital  Policy: Only change Specimen Src: to \"Line\" if specified by  physician order.  No site indicated    SARS-CoV-2, PCR (In-House) [[id number]]  (Abnormal) Collected:  06/20/20 2325    Order Status:  Completed Updated:  06/21/20 0041     SARS-CoV-2 Source NP Swab     SARS-CoV-2 by PCR DETECTED     Comment: **The [employer] GeneXpert Xpress SARS-CoV-2 Test has been made available for  use under the Emergency Use " Authorization (EUA) only.         Narrative:       Droplet, Contact, and Eye Protection  Rule-out COVID-19 panel, includes droplet/contact/eye  isolation order.  Check influenza to order this test only if  specifically indicated.  Expected Turn around time?->Rush (Cepheid 2-4 hours)    COVID/SARS CoV-2 PCR [[id number]] Collected:  06/20/20 2325    Order Status:  Completed Specimen:  Respirate from Nasopharyngeal Updated:  06/20/20 2333     COVID Order Status Received    Narrative:       Droplet, Contact, and Eye Protection  Rule-out COVID-19 panel, includes droplet/contact/eye  isolation order.  Check influenza to order this test only if  specifically indicated.  Expected Turn around time?->Rush (Cepheid 2-4 hours)          Assessment:  Active Hospital Problems    Diagnosis   • *Pneumonia due to COVID-19 virus [U07.1, J12.89]   • Acute hypoxemic respiratory failure (HCC) [J96.01]   • Hyponatremia [E87.1]   • Abnormal LFTs [R94.5]   • Reactive airway disease with acute exacerbation [J45.901]   • HTN (hypertension), benign [I10]   • BMI 40.0-44.9, adult (HCC) [Z68.41]       Plan:  Acute hypoxic respiratory failure  COVID-19 pneumonia  Fevers improved  On 4-5 L nasal cannula-no changes    -Continue supportive care.  Self proning, oxygen supplementation, judicious use of IV fluids  -Continue IV remdesivir 200mg x1, followed by 100 mg daily for a 5-day course.  Stop date 6/26/2020  -Monitor LFTs.  AST mildly elevated today  -Convalescent plasma given on 6/21/2020  -Follow blood cultures from 6/20, no growth till date  -Agree with 5-day course of empiric antibiotics given elevated procalcitonin. Stop date 06/25/2020  -Continue therapeutic anticoagulation given elevated d-dimer and increased risk for thromboembolic disease in COVID-19    -Inflammatory markers:  CRP: 4.14--> 4.27  Ferritin:3152--> 3132  LDH: 370--> 401  D-dimer: 0.75--> 1.10  Procalcitonin: 0.41  IL-6: < 2.0    Will recheck above labs and monitor every 48  hours     Hypertension    I have performed a physical exam and reviewed and updated ROS and plan today 6/24/2020.  In review of yesterday's note 6/23/2020, there are no changes except as documented above.      Discussed with internal medicine/Dr. Hull    ADDENDUM:  Patient evaluated by pulmonary today given ABG P02 of 20. Plan for CTA chest. Okayfrom ID standpoint to start dexamethasone per pulmonary recommendations. Will also give a 2nd unit of convalescent plasma when it arrives.     Discussed with Dr. Xiong and Dr. Bautista, pulmonary

## 2020-06-24 NOTE — CARE PLAN
Assumed day shift care at start of shift  Patient a+o x 4  Denies pain  Denies sob / +mckeon / 94% on 4lpm of oxygen via oxymask / ls dim throughout / IS up to 1250ml  Patient encouraged to cough and deep breath  Iv abx and iv remdesivir a/o  Afebrile, vss  Paperwork filled out by this RN and Dr. Eryn Hull - paperwork returned to patient   No needs at this time, wctm     Fall precautions/hourly rounding maintained, call light within reach and functioning, all items within reach.  Patient encouraged to call for assistance, poc reviewed with patient, ?'s/concerns answered.     Problem: Infection  Goal: Will remain free from infection  Outcome: PROGRESSING AS EXPECTED     Problem: Knowledge Deficit  Goal: Knowledge of disease process/condition, treatment plan, diagnostic tests, and medications will improve  Outcome: PROGRESSING AS EXPECTED  Goal: Knowledge of the prescribed therapeutic regimen will improve  Outcome: PROGRESSING AS EXPECTED     Problem: Respiratory:  Goal: Respiratory status will improve  Outcome: PROGRESSING AS EXPECTED

## 2020-06-24 NOTE — CARE PLAN
Problem: Nutritional:  Goal: Achieve adequate nutritional intake  Description: Patient will consume >50% of meals/supplements.  Outcome: PROGRESSING SLOWER THAN EXPECTED

## 2020-06-24 NOTE — CONSULTS
Pulmonary Consultation    Date of consult: 6/24/2020    Referring Physician  Eryn Hull D.O.    Reason for Consultation  COVID 19 pna with worsening hypoxemia    History of Presenting Illness  49 y.o. male who presented 6/20/2020 with COVID 19  Given convalescent plasma 6/21  Started remdemsivir 6/21  Lovenox 120 mg sc bid  He has been on 4l/NC saturating 92%-94%, minimal awake proning due to patient reluctance.    6/20: IL6 <2  6/20, 6/23 Ferritin 3152, 3132  6/20, 6/23 CRP 4.14, 4.27  6/20, 6/23 D dimer: 0.75, 1.1      Code Status  Full Code    Review of Systems  Review of Systems   Constitutional: Positive for malaise/fatigue.   HENT: Negative.    Eyes: Negative.    Respiratory: Positive for shortness of breath.    Cardiovascular: Positive for palpitations.   Gastrointestinal: Negative.    Genitourinary: Negative.    Musculoskeletal: Positive for myalgias.   Skin: Negative.    Neurological: Negative.    Endo/Heme/Allergies: Negative.    Psychiatric/Behavioral: Negative.        Past Medical History   has no past medical history on file.    Surgical History   has no past surgical history on file.    Family History  family history includes Arthritis in his mother; Heart Disease in his father.    Social History   reports that he has never smoked. He has never used smokeless tobacco. He reports current alcohol use. He reports that he does not use drugs.    Medications  Home Medications     Reviewed by Louie Wu (Pharmacy Tech) on 06/21/20 at 0023  Med List Status: Complete   Medication Last Dose Status   albuterol 108 (90 Base) MCG/ACT Aero Soln inhalation aerosol PRN Active   allopurinol (ZYLOPRIM) 100 MG Tab 6/20/2020 Active   amLODIPine (NORVASC) 10 MG Tab 6/20/2020 Active   Cetirizine HCl 10 MG Cap 6/20/2020 Active   fluticasone (FLONASE) 50 MCG/ACT nasal spray PRN Active   losartan (COZAAR) 50 MG Tab 6/20/2020 Active   metoprolol (LOPRESSOR) 25 MG Tab 6/21/2020 Active              Current  Facility-Administered Medications   Medication Dose Route Frequency Provider Last Rate Last Dose   • dexamethasone (DECADRON) tablet 6 mg  6 mg Oral DAILY Fanny Beal M.D.   6 mg at 06/24/20 1639   • enoxaparin (LOVENOX) inj 120 mg  1 mg/kg Subcutaneous Q12HRS ROSALIE Hood.OIrasema   120 mg at 06/24/20 0532   • ondansetron (ZOFRAN) syringe/vial injection 4 mg  4 mg Intravenous Q6HRS PRMICHAELA Ohara M.D.       • ondansetron (ZOFRAN ODT) dispertab 4 mg  4 mg Oral Q6HRS PRMICHAELA Ohara M.D.       • amLODIPine (NORVASC) tablet 10 mg  10 mg Oral Q DAY ROSALIE Hankins.O.   Stopped at 06/24/20 0600   • losartan (COZAAR) tablet 50 mg  50 mg Oral Q DAY Martin Mata D.O.   50 mg at 06/24/20 0532   • metoprolol (LOPRESSOR) tablet 25 mg  25 mg Oral TWICE DAILY Martin Mata D.O.   25 mg at 06/24/20 0532   • acetaminophen (TYLENOL) tablet 650 mg  650 mg Oral Q4HRS PRN Murali Ohara M.D.   650 mg at 06/23/20 1553   • albuterol inhaler 2 Puff  2 Puff Inhalation Q4HRS (RT) ROSALIE Hankins.OIrasema   2 Puff at 06/24/20 0940   • remdesivir 100 mg in  mL IVPB  100 mg Intravenous Q24HR Yolis Lazo M.D.   100 mg at 06/24/20 1318   • ampicillin/sulbactam (UNASYN) 3 g in  mL IVPB  3 g Intravenous Q6HRS Yolis Lazo M.D. 200 mL/hr at 06/24/20 1637 3 g at 06/24/20 1637       Allergies  No Known Allergies    Vital Signs last 24 hours  Temp:  [36.6 °C (97.9 °F)-37.1 °C (98.8 °F)] 36.8 °C (98.2 °F)  Pulse:  [89-98] 92  Resp:  [14-46] 17  BP: (106-137)/(60-78) 137/78  SpO2:  [90 %-99 %] 92 %    Physical Exam  Physical Exam  Constitutional:       Appearance: He is ill-appearing.   HENT:      Head: Normocephalic and atraumatic.      Nose: Nose normal.      Mouth/Throat:      Mouth: Mucous membranes are dry.   Eyes:      Extraocular Movements: Extraocular movements intact.      Pupils: Pupils are equal, round, and reactive to light.   Neck:      Musculoskeletal:  Normal range of motion.   Cardiovascular:      Rate and Rhythm: Normal rate.   Pulmonary:      Effort: Respiratory distress present.      Comments: Mild resp distress tachypnea  Completing his sentences  Not using accessory muscles  Abdominal:      General: Bowel sounds are normal.      Palpations: Abdomen is soft. There is no mass.   Musculoskeletal: Normal range of motion.   Skin:     General: Skin is warm.   Neurological:      General: No focal deficit present.   Psychiatric:         Mood and Affect: Mood normal.         Thought Content: Thought content normal.         Judgment: Judgment normal.         Fluids    Intake/Output Summary (Last 24 hours) at 6/24/2020 1641  Last data filed at 6/24/2020 1300  Gross per 24 hour   Intake 780 ml   Output --   Net 780 ml       Laboratory  Recent Results (from the past 48 hour(s))   aPTT    Collection Time: 06/23/20  3:21 AM   Result Value Ref Range    APTT 27.8 24.7 - 36.0 sec   CBC with Differential    Collection Time: 06/23/20  3:21 AM   Result Value Ref Range    WBC 5.1 4.8 - 10.8 K/uL    RBC 4.85 4.70 - 6.10 M/uL    Hemoglobin 13.1 (L) 14.0 - 18.0 g/dL    Hematocrit 40.8 (L) 42.0 - 52.0 %    MCV 84.1 81.4 - 97.8 fL    MCH 27.0 27.0 - 33.0 pg    MCHC 32.1 (L) 33.7 - 35.3 g/dL    RDW 42.7 35.9 - 50.0 fL    Platelet Count 168 164 - 446 K/uL    MPV 9.7 9.0 - 12.9 fL    Neutrophils-Polys 68.10 44.00 - 72.00 %    Lymphocytes 22.20 22.00 - 41.00 %    Monocytes 8.90 0.00 - 13.40 %    Eosinophils 0.00 0.00 - 6.90 %    Basophils 0.20 0.00 - 1.80 %    Immature Granulocytes 0.60 0.00 - 0.90 %    Nucleated RBC 0.00 /100 WBC    Neutrophils (Absolute) 3.44 1.82 - 7.42 K/uL    Lymphs (Absolute) 1.12 1.00 - 4.80 K/uL    Monos (Absolute) 0.45 0.00 - 0.85 K/uL    Eos (Absolute) 0.00 0.00 - 0.51 K/uL    Baso (Absolute) 0.01 0.00 - 0.12 K/uL    Immature Granulocytes (abs) 0.03 0.00 - 0.11 K/uL    NRBC (Absolute) 0.00 K/uL   Creatinine Kinase    Collection Time: 06/23/20  3:21 AM   Result  Value Ref Range    CPK Total 823 (H) 0 - 154 U/L   CRP Quantitative (Non-Cardiac)    Collection Time: 06/23/20  3:21 AM   Result Value Ref Range    Stat C-Reactive Protein 4.27 (H) 0.00 - 0.75 mg/dL   D-Dimer    Collection Time: 06/23/20  3:21 AM   Result Value Ref Range    D-Dimer Screen 1.10 (H) 0.00 - 0.50 ug/mL (FEU)   Ferritin    Collection Time: 06/23/20  3:21 AM   Result Value Ref Range    Ferritin 3132.0 (H) 22.0 - 322.0 ng/mL   Fibrinogen    Collection Time: 06/23/20  3:21 AM   Result Value Ref Range    Fibrinogen 532 (H) 215 - 460 mg/dL   Hepatic Function Panel    Collection Time: 06/23/20  3:21 AM   Result Value Ref Range    Alkaline Phosphatase 41 30 - 99 U/L    AST(SGOT) 43 12 - 45 U/L    ALT(SGPT) 38 2 - 50 U/L    Total Bilirubin 0.4 0.1 - 1.5 mg/dL    Direct Bilirubin <0.2 0.1 - 0.5 mg/dL    Indirect Bilirubin see below 0.0 - 1.0 mg/dL    Albumin 3.6 3.2 - 4.9 g/dL    Total Protein 6.8 6.0 - 8.2 g/dL   LDH    Collection Time: 06/23/20  3:21 AM   Result Value Ref Range    LDH Total 401 (H) 107 - 266 U/L   Magnesium    Collection Time: 06/23/20  3:21 AM   Result Value Ref Range    Magnesium 2.1 1.5 - 2.5 mg/dL   proBrain Natriuretic Peptide, NT    Collection Time: 06/23/20  3:21 AM   Result Value Ref Range    NT-proBNP 11 0 - 125 pg/mL   Prothrombin Time    Collection Time: 06/23/20  3:21 AM   Result Value Ref Range    PT 13.6 12.0 - 14.6 sec    INR 1.01 0.87 - 1.13   Troponin    Collection Time: 06/23/20  3:21 AM   Result Value Ref Range    Troponin T 12 6 - 19 ng/L   DAILY CMP X 3 DAYS    Collection Time: 06/23/20  3:21 AM   Result Value Ref Range    Sodium 135 135 - 145 mmol/L    Potassium 4.0 3.6 - 5.5 mmol/L    Chloride 100 96 - 112 mmol/L    Co2 21 20 - 33 mmol/L    Anion Gap 14.0 7.0 - 16.0    Glucose 113 (H) 65 - 99 mg/dL    Bun 21 8 - 22 mg/dL    Creatinine 1.06 0.50 - 1.40 mg/dL    Calcium 8.3 (L) 8.5 - 10.5 mg/dL    Globulin 3.2 1.9 - 3.5 g/dL    A-G Ratio 1.1 g/dL   ESTIMATED GFR     Collection Time: 06/23/20  3:21 AM   Result Value Ref Range    GFR If African American >60 >60 mL/min/1.73 m 2    GFR If Non African American >60 >60 mL/min/1.73 m 2   CBC with Differential    Collection Time: 06/24/20  2:24 AM   Result Value Ref Range    WBC 4.7 (L) 4.8 - 10.8 K/uL    RBC 4.57 (L) 4.70 - 6.10 M/uL    Hemoglobin 13.0 (L) 14.0 - 18.0 g/dL    Hematocrit 38.4 (L) 42.0 - 52.0 %    MCV 84.0 81.4 - 97.8 fL    MCH 28.4 27.0 - 33.0 pg    MCHC 33.9 33.7 - 35.3 g/dL    RDW 42.2 35.9 - 50.0 fL    Platelet Count 188 164 - 446 K/uL    MPV 10.4 9.0 - 12.9 fL    Neutrophils-Polys 67.00 44.00 - 72.00 %    Lymphocytes 23.50 22.00 - 41.00 %    Monocytes 8.10 0.00 - 13.40 %    Eosinophils 0.40 0.00 - 6.90 %    Basophils 0.20 0.00 - 1.80 %    Immature Granulocytes 0.80 0.00 - 0.90 %    Nucleated RBC 0.00 /100 WBC    Neutrophils (Absolute) 3.16 1.82 - 7.42 K/uL    Lymphs (Absolute) 1.11 1.00 - 4.80 K/uL    Monos (Absolute) 0.38 0.00 - 0.85 K/uL    Eos (Absolute) 0.02 0.00 - 0.51 K/uL    Baso (Absolute) 0.01 0.00 - 0.12 K/uL    Immature Granulocytes (abs) 0.04 0.00 - 0.11 K/uL    NRBC (Absolute) 0.00 K/uL   DAILY CMP X 3 DAYS    Collection Time: 06/24/20  2:24 AM   Result Value Ref Range    Sodium 135 135 - 145 mmol/L    Potassium 4.0 3.6 - 5.5 mmol/L    Chloride 102 96 - 112 mmol/L    Co2 22 20 - 33 mmol/L    Anion Gap 11.0 7.0 - 16.0    Glucose 112 (H) 65 - 99 mg/dL    Bun 18 8 - 22 mg/dL    Creatinine 0.84 0.50 - 1.40 mg/dL    Calcium 8.1 (L) 8.5 - 10.5 mg/dL    AST(SGOT) 51 (H) 12 - 45 U/L    ALT(SGPT) 41 2 - 50 U/L    Alkaline Phosphatase 42 30 - 99 U/L    Total Bilirubin 0.4 0.1 - 1.5 mg/dL    Albumin 3.4 3.2 - 4.9 g/dL    Total Protein 6.5 6.0 - 8.2 g/dL    Globulin 3.1 1.9 - 3.5 g/dL    A-G Ratio 1.1 g/dL   ESTIMATED GFR    Collection Time: 06/24/20  2:24 AM   Result Value Ref Range    GFR If African American >60 >60 mL/min/1.73 m 2    GFR If Non African American >60 >60 mL/min/1.73 m 2   ABG - LAB    Collection  Time: 20 12:38 PM   Result Value Ref Range    Ph 7.40 7.40 - 7.50    Pco2 38.8 (H) 26.0 - 37.0 mmHg    Po2 20.7 (LL) 64.0 - 87.0 mmHg    O2 Saturation 31.1 (L) 93.0 - 99.0 %    Hco3 23 17 - 25 mmol/L    Base Excess -1 -4 - 3 mmol/L    Body Temp 36.5 Centigrade    Ph -TC 7.41 7.40 - 7.50    Pco2 -TC 38.0 (H) 26.0 - 37.0 mmHg    Po2 -TC 20.0 (LL) 64.0 - 87.0 mmHg   EKG    Collection Time: 20  2:28 PM   Result Value Ref Range    Report       [facility]    Test Date:  2020  Pt Name:    LISSY ASTUDILLO               Department: [facility]  MRN:        [id number]                      Room:       [facility]  Gender:     Male                         Technician: NATHALIA  :        1970                   Requested By:HUE NUNES  Order #:    [id number]                    Reading MD: Bud Summers MD    Measurements  Intervals                                Axis  Rate:       91                           P:          12  NJ:         152                          QRS:        74  QRSD:       94                           T:          14  QT:         364  QTc:        448    Interpretive Statements  SINUS RHYTHM  BORDERLINE LOW VOLTAGE IN FRONTAL LEADS  Compared to ECG 2020 23:19:25  ST (T wave) deviation no longer present  T-wave abnormality no longer present  Q waves no longer present  Electronically Signed On 2020 15:41:15 PDT by Bud Summers MD         Imaging   cxr personally reviewed and patchy infiltrates    Assessment/Plan  * Pneumonia due to COVID-19 virus- (present on admission)  Assessment & Plan  Persistent need of oxygen  With unchanged Ferritin and CRP but numbers no drastically high  Received convalescent serum, on day 4 on Remdesivir .    ABG is not correct as acid base is normal and with the severity of hypoxemia he should be off the oxygen curve and saturation cannot be at 92-95%. Thus there is an error due to bubble or mixed venous component.    Recommend:  16 hr awake proning   Add  dexamethasone 6 mg qday till discharge or for 10 days whichever comes first  Repeat convalescent serum  Continue anticoagulated full  No indication for CT Chest as no concern for saddle embolism and it would not change current management  Maintain sat > 89%.         Discussed patient condition and risk of morbidity and/or mortality and treatment plan with Dr. Hull and Dr. Lazo

## 2020-06-24 NOTE — PROGRESS NOTES
[facility] Medicine Daily Progress Note    Date of Service  6/24/2020    Chief Complaint  49 y.o. male admitted 6/20/2020 with fever and SOB    Hospital Course    PMHx HTN.  Presenting with cough and SOB.  outpt COVID neg on 6/15.  He does however have known + exposure to family members.  Sx's developed and worsened over 5 days. In ED temp 102 and 87% on RA.   CXR showing B patchy infiltrates.  Admitted on C3/Zithro for community acquired pneumonia and acute hypoxic resp failure      Interval Problem Update    Increasing sob, sat 90%  Reports feeling better  Tachypnea noted    abg reviewed, for PE study and pulm consult    Consultants/Specialty  ID    Code Status  full    Disposition    TBD  Monitor closely, if malaise, or decompensation, transfer to ICU for closer monitoring,  And ? Need for intubation  D/w staff, continue self prone    Review of Systems  Review of Systems   Constitutional: Negative for chills, diaphoresis, fever and malaise/fatigue.   HENT: Negative for nosebleeds.    Respiratory: Positive for cough and shortness of breath. Negative for sputum production.    Cardiovascular: Negative for chest pain, palpitations and leg swelling.   Gastrointestinal: Negative for abdominal pain, diarrhea and nausea.   Genitourinary: Negative for dysuria and urgency.   Musculoskeletal: Negative for back pain and myalgias.   Skin: Negative for rash.   Neurological: Positive for weakness. Negative for dizziness and headaches.   Psychiatric/Behavioral: Negative for depression. The patient is not nervous/anxious.         Physical Exam  Temp:  [36.6 °C (97.9 °F)-37.8 °C (100.1 °F)] 37.1 °C (98.8 °F)  Pulse:  [89-98] 94  Resp:  [14-46] 20  BP: (106-134)/(60-68) 134/68  SpO2:  [90 %-99 %] 96 %    Physical Exam  Vitals signs reviewed.   Constitutional:       General: He is not in acute distress.     Appearance: He is well-developed. He is not ill-appearing, toxic-appearing or diaphoretic.   HENT:      Head: Normocephalic and  atraumatic.   Eyes:      Conjunctiva/sclera: Conjunctivae normal.   Neck:      Vascular: No JVD.   Cardiovascular:      Rate and Rhythm: Normal rate.      Heart sounds: No murmur. No gallop.    Pulmonary:      Effort: Respiratory distress present.      Breath sounds: No stridor. No wheezing, rhonchi or rales.      Comments: Diminished breath sounds through out  tachypnea  Abdominal:      General: There is no distension.      Palpations: Abdomen is soft.      Tenderness: There is no rebound.   Musculoskeletal:         General: No swelling or tenderness.      Right lower leg: No edema.      Left lower leg: No edema.   Skin:     General: Skin is warm and dry.      Coloration: Skin is not pale.   Neurological:      General: No focal deficit present.      Mental Status: He is oriented to person, place, and time. Mental status is at baseline.      Cranial Nerves: No cranial nerve deficit.      Sensory: No sensory deficit.      Motor: Weakness present.   Psychiatric:         Thought Content: Thought content normal.         Fluids    Intake/Output Summary (Last 24 hours) at 6/24/2020 1310  Last data filed at 6/24/2020 0800  Gross per 24 hour   Intake 540 ml   Output --   Net 540 ml       Laboratory  Recent Labs     06/22/20  1211 06/23/20  0321 06/24/20  0224   WBC 4.9 5.1 4.7*   RBC 5.15 4.85 4.57*   HEMOGLOBIN 13.9* 13.1* 13.0*   HEMATOCRIT 43.7 40.8* 38.4*   MCV 84.9 84.1 84.0   MCH 27.0 27.0 28.4   MCHC 31.8* 32.1* 33.9   RDW 42.5 42.7 42.2   PLATELETCT 184 168 188   MPV 10.0 9.7 10.4     Recent Labs     06/22/20  1211 06/23/20  0321 06/24/20  0224   SODIUM 136 135 135   POTASSIUM 4.1 4.0 4.0   CHLORIDE 100 100 102   CO2 23 21 22   GLUCOSE 112* 113* 112*   BUN 18 21 18   CREATININE 1.14 1.06 0.84   CALCIUM 8.4* 8.3* 8.1*     Recent Labs     06/23/20  0321   APTT 27.8   INR 1.01               Imaging  DX-CHEST-PORTABLE (1 VIEW)   Final Result         Patchy bilateral lower lung opacities, atelectasis or multifocal  pneumonia.      CT-CTA CHEST PULMONARY ARTERY W/ RECONS    (Results Pending)        Assessment/Plan  * Pneumonia due to COVID-19 virus  Assessment & Plan  DC tele  Wean 02 as tolerated  Cont with empiric IV abx   Follow up covid 19 labs ordered   Blood cx neg  Remdesivir D2  S/p convalescent plasma 6/21  ID following   Clinically is improved    6/23 DDimer>1: increaseing crp, change to full dose anticoagulation    6/24 increasing sob with tachypnea, on 4L face mask sat 90%  ABG 7.41/ PO2 20  pulm consulted, Dr. Bautista, appreciate input  - CT PE ordered      Reactive airway disease with acute exacerbation  Assessment & Plan  Pt states he only has sx's with exercise or with his allergies.  Start scheduled BD therapy  Holding on steroids but will have a low thershold to initiate if any worsening of his COVID disease or worsening of his resp exam    Abnormal LFTs  Assessment & Plan  Mild, likely due to covid   Cont ot trend    Hyponatremia  Assessment & Plan  Mild, cont to trend    Acute hypoxemic respiratory failure (HCC)  Assessment & Plan  Wean 02 as tolerated  COVID +, Hx reactive airway disease, possible bacterial superinfection    BMI 40.0-44.9, adult (HCC)- (present on admission)  Assessment & Plan  Encourage weight loss    HTN (hypertension), benign- (present on admission)  Assessment & Plan  Resume home CCB, BB and ARB with parameters       VTE prophylaxis: enoxaparin

## 2020-06-24 NOTE — CARE PLAN
Assumed day shift care at start of shift  Patient a+o x 4  Denies pain  Denies sob / +mckeon / 94% on 4lpm of oxygen via oxymask / ls dim throughout / IS up to 1250ml  Febrile x 1 this shift - acetaminophen administered at that time  Patient encouraged to cough and deep breath  No needs at this time, [facility], [place]     Fall precautions/hourly rounding maintained, call light within reach and functioning, all items within reach.  Patient encouraged to call for assistance, poc reviewed with patient, ?'s/concerns answered.     Problem: Infection  Goal: Will remain free from infection  Outcome: PROGRESSING AS EXPECTED     Problem: Respiratory:  Goal: Respiratory status will improve  Outcome: PROGRESSING AS EXPECTED

## 2020-06-24 NOTE — DISCHARGE PLANNING
Hospital Care Management Discharge Planning       Anticipated Discharge Disposition:   · TBD     Action:   · Chart reviewed.     Barriers to Discharge:   · Medical clearance     Plan:   · Continue to provide support services and assistance with discharge planning as needed.

## 2020-06-24 NOTE — ASSESSMENT & PLAN NOTE
Persistent need of oxygen 4-6 liters   subjectively improved and clinically better and markers down  LFTs increasing and may be due to remdesivir will need monitoring  ON dexamethasone day 4/10  Remdisivir day 5/5  On full dose lovenox  S/p 2 doses of plasma   Continue 16 hr awake proning encourage pt, transition to nasal cannula  +7000 since admit would but no output recorded As improving may not need lasix

## 2020-06-24 NOTE — CARE PLAN
Problem: Infection  Goal: Will remain free from infection  Outcome: PROGRESSING AS EXPECTED  Intervention: Implement standard precautions and perform hand washing before and after patient contact  Note: Handwashing performed, pt educate on importance of handwashing      Problem: Pain Management  Goal: Pain level will decrease to patient's comfort goal  Outcome: PROGRESSING AS EXPECTED  Flowsheets  Taken 6/21/2020 2140 by Hunter Minor R.N.  Non Verbal Scale:   Calm   Sleeping  Taken 6/23/2020 2045 by Carlos Spivey R.N.  Pain Rating Scale (NPRS): 0

## 2020-06-25 LAB
ALBUMIN SERPL BCP-MCNC: 3.3 G/DL (ref 3.2–4.9)
ALP SERPL-CCNC: 48 U/L (ref 30–99)
ALT SERPL-CCNC: 48 U/L (ref 2–50)
APTT PPP: 37.4 SEC (ref 24.7–36)
AST SERPL-CCNC: 54 U/L (ref 12–45)
BASOPHILS # BLD AUTO: 0 % (ref 0–1.8)
BASOPHILS # BLD: 0 K/UL (ref 0–0.12)
BILIRUB CONJ SERPL-MCNC: <0.2 MG/DL (ref 0.1–0.5)
BILIRUB INDIRECT SERPL-MCNC: ABNORMAL MG/DL (ref 0–1)
BILIRUB SERPL-MCNC: 0.5 MG/DL (ref 0.1–1.5)
CK SERPL-CCNC: 568 U/L (ref 0–154)
CRP SERPL HS-MCNC: 3.83 MG/DL (ref 0–0.75)
D DIMER PPP IA.FEU-MCNC: 0.53 UG/ML (FEU) (ref 0–0.5)
EOSINOPHIL # BLD AUTO: 0 K/UL (ref 0–0.51)
EOSINOPHIL NFR BLD: 0 % (ref 0–6.9)
ERYTHROCYTE [DISTWIDTH] IN BLOOD BY AUTOMATED COUNT: 42.2 FL (ref 35.9–50)
FERRITIN SERPL-MCNC: 2934 NG/ML (ref 22–322)
FIBRINOGEN PPP-MCNC: 616 MG/DL (ref 215–460)
HCT VFR BLD AUTO: 42.3 % (ref 42–52)
HGB BLD-MCNC: 13.5 G/DL (ref 14–18)
IL6 SERPL-MCNC: 3.6 PG/ML
IMM GRANULOCYTES # BLD AUTO: 0.03 K/UL (ref 0–0.11)
IMM GRANULOCYTES NFR BLD AUTO: 1.2 % (ref 0–0.9)
INR PPP: 1.07 (ref 0.87–1.13)
LDH SERPL L TO P-CCNC: 463 U/L (ref 107–266)
LYMPHOCYTES # BLD AUTO: 0.72 K/UL (ref 1–4.8)
LYMPHOCYTES NFR BLD: 28.1 % (ref 22–41)
MAGNESIUM SERPL-MCNC: 2.3 MG/DL (ref 1.5–2.5)
MCH RBC QN AUTO: 26.9 PG (ref 27–33)
MCHC RBC AUTO-ENTMCNC: 31.9 G/DL (ref 33.7–35.3)
MCV RBC AUTO: 84.4 FL (ref 81.4–97.8)
MONOCYTES # BLD AUTO: 0.13 K/UL (ref 0–0.85)
MONOCYTES NFR BLD AUTO: 5.1 % (ref 0–13.4)
NEUTROPHILS # BLD AUTO: 1.68 K/UL (ref 1.82–7.42)
NEUTROPHILS NFR BLD: 65.6 % (ref 44–72)
NRBC # BLD AUTO: 0 K/UL
NRBC BLD-RTO: 0 /100 WBC
NT-PROBNP SERPL IA-MCNC: 430 PG/ML (ref 0–125)
PLATELET # BLD AUTO: 224 K/UL (ref 164–446)
PMV BLD AUTO: 9.5 FL (ref 9–12.9)
PROT SERPL-MCNC: 6.6 G/DL (ref 6–8.2)
PROTHROMBIN TIME: 14.3 SEC (ref 12–14.6)
RBC # BLD AUTO: 5.01 M/UL (ref 4.7–6.1)
TROPONIN T SERPL-MCNC: 10 NG/L (ref 6–19)
WBC # BLD AUTO: 2.6 K/UL (ref 4.8–10.8)

## 2020-06-25 PROCEDURE — 82728 ASSAY OF FERRITIN: CPT

## 2020-06-25 PROCEDURE — 36415 COLL VENOUS BLD VENIPUNCTURE: CPT

## 2020-06-25 PROCEDURE — 86140 C-REACTIVE PROTEIN: CPT

## 2020-06-25 PROCEDURE — 700102 HCHG RX REV CODE 250 W/ 637 OVERRIDE(OP): Performed by: HOSPITALIST

## 2020-06-25 PROCEDURE — 99232 SBSQ HOSP IP/OBS MODERATE 35: CPT | Mod: CS | Performed by: INTERNAL MEDICINE

## 2020-06-25 PROCEDURE — 83520 IMMUNOASSAY QUANT NOS NONAB: CPT

## 2020-06-25 PROCEDURE — 700105 HCHG RX REV CODE 258: Performed by: INTERNAL MEDICINE

## 2020-06-25 PROCEDURE — 82550 ASSAY OF CK (CPK): CPT

## 2020-06-25 PROCEDURE — 99233 SBSQ HOSP IP/OBS HIGH 50: CPT | Mod: CS | Performed by: INTERNAL MEDICINE

## 2020-06-25 PROCEDURE — 700102 HCHG RX REV CODE 250 W/ 637 OVERRIDE(OP): Performed by: INTERNAL MEDICINE

## 2020-06-25 PROCEDURE — 85384 FIBRINOGEN ACTIVITY: CPT

## 2020-06-25 PROCEDURE — 84484 ASSAY OF TROPONIN QUANT: CPT

## 2020-06-25 PROCEDURE — 83735 ASSAY OF MAGNESIUM: CPT

## 2020-06-25 PROCEDURE — 700111 HCHG RX REV CODE 636 W/ 250 OVERRIDE (IP): Performed by: INTERNAL MEDICINE

## 2020-06-25 PROCEDURE — 770021 HCHG ROOM/CARE - ISO PRIVATE

## 2020-06-25 PROCEDURE — 94760 N-INVAS EAR/PLS OXIMETRY 1: CPT

## 2020-06-25 PROCEDURE — 94640 AIRWAY INHALATION TREATMENT: CPT

## 2020-06-25 PROCEDURE — 83615 LACTATE (LD) (LDH) ENZYME: CPT

## 2020-06-25 PROCEDURE — 85610 PROTHROMBIN TIME: CPT

## 2020-06-25 PROCEDURE — 83880 ASSAY OF NATRIURETIC PEPTIDE: CPT

## 2020-06-25 PROCEDURE — A9270 NON-COVERED ITEM OR SERVICE: HCPCS | Performed by: INTERNAL MEDICINE

## 2020-06-25 PROCEDURE — 85025 COMPLETE CBC W/AUTO DIFF WBC: CPT

## 2020-06-25 PROCEDURE — A9270 NON-COVERED ITEM OR SERVICE: HCPCS | Performed by: HOSPITALIST

## 2020-06-25 PROCEDURE — 85379 FIBRIN DEGRADATION QUANT: CPT

## 2020-06-25 PROCEDURE — 80076 HEPATIC FUNCTION PANEL: CPT

## 2020-06-25 PROCEDURE — 85730 THROMBOPLASTIN TIME PARTIAL: CPT

## 2020-06-25 RX ORDER — ALBUTEROL SULFATE 90 UG/1
2 AEROSOL, METERED RESPIRATORY (INHALATION)
Status: DISCONTINUED | OUTPATIENT
Start: 2020-06-26 | End: 2020-07-01 | Stop reason: HOSPADM

## 2020-06-25 RX ORDER — ALBUTEROL SULFATE 90 UG/1
2 AEROSOL, METERED RESPIRATORY (INHALATION)
Status: DISCONTINUED | OUTPATIENT
Start: 2020-06-25 | End: 2020-07-01 | Stop reason: HOSPADM

## 2020-06-25 RX ADMIN — AMPICILLIN AND SULBACTAM 3 G: 2; 1 INJECTION, POWDER, FOR SOLUTION INTRAVENOUS at 13:03

## 2020-06-25 RX ADMIN — ENOXAPARIN SODIUM 120 MG: 120 INJECTION SUBCUTANEOUS at 05:12

## 2020-06-25 RX ADMIN — AMPICILLIN AND SULBACTAM 3 G: 2; 1 INJECTION, POWDER, FOR SOLUTION INTRAVENOUS at 18:00

## 2020-06-25 RX ADMIN — AMPICILLIN AND SULBACTAM 3 G: 2; 1 INJECTION, POWDER, FOR SOLUTION INTRAVENOUS at 05:12

## 2020-06-25 RX ADMIN — AMPICILLIN AND SULBACTAM 3 G: 2; 1 INJECTION, POWDER, FOR SOLUTION INTRAVENOUS at 00:00

## 2020-06-25 RX ADMIN — ALBUTEROL SULFATE 2 PUFF: 90 AEROSOL, METERED RESPIRATORY (INHALATION) at 09:27

## 2020-06-25 RX ADMIN — REMDESIVIR 100 MG: 5 INJECTION INTRAVENOUS at 13:43

## 2020-06-25 RX ADMIN — ALBUTEROL SULFATE 2 PUFF: 90 AEROSOL, METERED RESPIRATORY (INHALATION) at 19:51

## 2020-06-25 RX ADMIN — LOSARTAN POTASSIUM 50 MG: 50 TABLET, FILM COATED ORAL at 05:12

## 2020-06-25 RX ADMIN — ENOXAPARIN SODIUM 120 MG: 120 INJECTION SUBCUTANEOUS at 18:00

## 2020-06-25 RX ADMIN — METOPROLOL TARTRATE 25 MG: 25 TABLET, FILM COATED ORAL at 05:12

## 2020-06-25 RX ADMIN — AMLODIPINE BESYLATE 10 MG: 5 TABLET ORAL at 05:12

## 2020-06-25 RX ADMIN — DEXAMETHASONE 6 MG: 6 TABLET ORAL at 05:12

## 2020-06-25 RX ADMIN — METOPROLOL TARTRATE 25 MG: 25 TABLET, FILM COATED ORAL at 18:00

## 2020-06-25 ASSESSMENT — ENCOUNTER SYMPTOMS
WEAKNESS: 1
HEADACHES: 0
DIARRHEA: 0
COUGH: 1
SHORTNESS OF BREATH: 1
PALPITATIONS: 0
SPUTUM PRODUCTION: 0
DIAPHORESIS: 0
BACK PAIN: 0
CONSTIPATION: 0
NAUSEA: 0
MYALGIAS: 1
VOMITING: 0
NERVOUS/ANXIOUS: 0
DIZZINESS: 0
MYALGIAS: 0
SPEECH CHANGE: 0
FEVER: 0
WHEEZING: 0
ABDOMINAL PAIN: 0
DEPRESSION: 0
HEMOPTYSIS: 0
FOCAL WEAKNESS: 0

## 2020-06-25 NOTE — PROGRESS NOTES
Infectious Disease Progress Note    Author: Yolis Lazo M.D. Date & Time of service: 2020  8:32 AM    Chief Complaint:  Follow-up for COVID-19 pneumonia    Interval History:   T-max 99.5 no CBC done today.  Fever curve slightly improved.  Down to 4 L nasal cannula.  Received convalescent plasma and first dose of remdesivir yesterday and tolerated them.  Feels as if his fevers are improving.  Still struggles with shortness of breath   afebrile WBC 5.1, oxygen requirements stable.  States his fevers have resolved.  He continues to have shortness of breath with minimal exertion.  His nonproductive cough is also more consistent.  No diarrhea.   afebrile WBC 4.7.  Remains on 5 L nasal cannula without any changes. Breathing slowly improving and he states he has more energy. Poor appetite   AFebrile, WBC 2.6 received 2nd unit of plasma yesterday and had some hives on his left arm which have now resolved. On 4 liters today.  Patient is prone and states when he moves into a sitting position he has some trouble breathing.  He also states that food taste salty to him, even his ice cream.  CTA chest canceled    Labs Reviewed and Medications Reviewed.    Review of Systems:  Review of Systems   Constitutional: Positive for malaise/fatigue. Negative for diaphoresis and fever.   Respiratory: Positive for cough and shortness of breath. Negative for hemoptysis.         Slowly improving   Cardiovascular: Negative for chest pain.   Gastrointestinal: Negative for abdominal pain, diarrhea, nausea and vomiting.   Musculoskeletal: Positive for myalgias.   Neurological: Negative for dizziness and headaches.       Hemodynamics:  Temp (24hrs), Av.7 °C (98 °F), Min:36.2 °C (97.1 °F), Max:37.2 °C (99 °F)  Temperature: 36.2 °C (97.1 °F)  Pulse  Av.1  Min: 71  Max: 107   Blood Pressure: 135/80       Physical Exam:  Physical Exam  Constitutional:       Appearance: He is obese. He is ill-appearing.   HENT:       Mouth/Throat:      Mouth: Mucous membranes are moist.      Pharynx: No oropharyngeal exudate.   Eyes:      Extraocular Movements: Extraocular movements intact.      Conjunctiva/sclera: Conjunctivae normal.      Pupils: Pupils are equal, round, and reactive to light.   Neck:      Musculoskeletal: Normal range of motion and neck supple.   Cardiovascular:      Rate and Rhythm: Regular rhythm. Tachycardia present.   Pulmonary:      Effort: Pulmonary effort is normal.   Abdominal:      Palpations: Abdomen is soft.      Tenderness: There is no abdominal tenderness.   Musculoskeletal: Normal range of motion.   Skin:     General: Skin is warm and dry.   Neurological:      General: No focal deficit present.      Mental Status: He is alert and oriented to person, place, and time.      Cranial Nerves: No cranial nerve deficit.   Psychiatric:         Mood and Affect: Mood normal.         Behavior: Behavior normal.         Meds:    Current Facility-Administered Medications:   •  dexamethasone  •  enoxaparin (LOVENOX) injection  •  ondansetron  •  ondansetron  •  amLODIPine  •  losartan  •  metoprolol  •  acetaminophen  •  albuterol  •  [COMPLETED] Remdesivir in NS IVPB **FOLLOWED BY** Remdesivir in NS IVPB  •  ampicillin-sulbactam (UNASYN) IV    Labs:  Recent Labs     06/23/20  0321 06/24/20 0224 06/25/20  0405   WBC 5.1 4.7* 2.6*   RBC 4.85 4.57* 5.01   HEMOGLOBIN 13.1* 13.0* 13.5*   HEMATOCRIT 40.8* 38.4* 42.3   MCV 84.1 84.0 84.4   MCH 27.0 28.4 26.9*   RDW 42.7 42.2 42.2   PLATELETCT 168 188 224   MPV 9.7 10.4 9.5   NEUTSPOLYS 68.10 67.00 65.60   LYMPHOCYTES 22.20 23.50 28.10   MONOCYTES 8.90 8.10 5.10   EOSINOPHILS 0.00 0.40 0.00   BASOPHILS 0.20 0.20 0.00     Recent Labs     06/22/20  1211 06/23/20  0321 06/24/20  0224 06/25/20  0405   SODIUM 136 135 135  --    POTASSIUM 4.1 4.0 4.0  --    CHLORIDE 100 100 102  --    CO2 23 21 22  --    GLUCOSE 112* 113* 112*  --    BUN 18 21 18  --    CPKTOTAL  --  823*  --  568*  "    Recent Labs     06/22/20  1211 06/23/20  0321 06/24/20  0224 06/25/20  0405   ALBUMIN 3.9 3.6 3.4 3.3   TBILIRUBIN 0.3 0.4 0.4 0.5   ALKPHOSPHAT 45 41 42 48   TOTPROTEIN 7.2 6.8 6.5 6.6   ALTSGPT 45 38 41 48   ASTSGOT 45 43 51* 54*   CREATININE 1.14 1.06 0.84  --        Imaging:  Dx-chest-portable (1 View)    Result Date: 6/20/2020 6/20/2020 11:28 PM HISTORY/REASON FOR EXAM:  Shortness of Breath Fever. Suspect possible Covid-19 exposure rule out TECHNIQUE/EXAM DESCRIPTION AND NUMBER OF VIEWS: Single portable view of the chest. COMPARISON: None FINDINGS: Patchy bilateral lower lung opacities No pleural effusion. No pneumothorax. Normal cardiopericardial silhouette.     Patchy bilateral lower lung opacities, atelectasis or multifocal pneumonia.      Micro:  Results     Procedure Component Value Units Date/Time    Blood Culture [[id number]] Collected:  06/20/20 2321    Order Status:  Completed Specimen:  Blood from Peripheral Updated:  06/22/20 0726     Significant Indicator NEG     Source BLD     Site PERIPHERAL     Culture Result No Growth  Note: Blood cultures are incubated for 5 days and  are monitored continuously.Positive blood cultures  are called to the RN and reported as soon as  they are identified.      Narrative:       Droplet, Contact, and Eye Protection  2 of 2 blood culture x2  Sites order. Per Hospital Policy:  Only change Specimen Src: to \"Line\" if specified by physician  order.  No site indicated    Blood Culture [[id number]] Collected:  06/20/20 2321    Order Status:  Completed Specimen:  Blood from Peripheral Updated:  06/22/20 0726     Significant Indicator NEG     Source BLD     Site PERIPHERAL     Culture Result No Growth  Note: Blood cultures are incubated for 5 days and  are monitored continuously.Positive blood cultures  are called to the RN and reported as soon as  they are identified.      Narrative:       Droplet, Contact, and Eye Protection  1 of 2 for Blood Culture x 2 sites order. Per " "Hospital  Policy: Only change Specimen Src: to \"Line\" if specified by  physician order.  No site indicated    SARS-CoV-2, PCR (In-House) [[id number]]  (Abnormal) Collected:  06/20/20 2325    Order Status:  Completed Updated:  06/21/20 0041     SARS-CoV-2 Source NP Swab     SARS-CoV-2 by PCR DETECTED     Comment: **The [employer] Interactive GeneXpert Xpress SARS-CoV-2 Test has been made available for  use under the Emergency Use Authorization (EUA) only.         Narrative:       Droplet, Contact, and Eye Protection  Rule-out COVID-19 panel, includes droplet/contact/eye  isolation order.  Check influenza to order this test only if  specifically indicated.  Expected Turn around time?->Rush (Cepheid 2-4 hours)    COVID/SARS CoV-2 PCR [[id number]] Collected:  06/20/20 2325    Order Status:  Completed Specimen:  Respirate from Nasopharyngeal Updated:  06/20/20 2333     COVID Order Status Received    Narrative:       Droplet, Contact, and Eye Protection  Rule-out COVID-19 panel, includes droplet/contact/eye  isolation order.  Check influenza to order this test only if  specifically indicated.  Expected Turn around time?->Rush (Cepheid 2-4 hours)          Assessment:  Active Hospital Problems    Diagnosis   • *Pneumonia due to COVID-19 virus [U07.1, J12.89]   • Acute hypoxemic respiratory failure (HCC) [J96.01]   • Hyponatremia [E87.1]   • Abnormal LFTs [R94.5]   • Reactive airway disease with acute exacerbation [J45.901]   • HTN (hypertension), benign [I10]   • BMI 40.0-44.9, adult (HCC) [Z68.41]       Plan:  Acute hypoxic respiratory failure  COVID-19 pneumonia  Fevers resolved  Leukopenic  On 4 liters nasal cannula    -Continue supportive care.  Self proning, oxygen supplementation, judicious use of IV fluids  -Continue IV remdesivir 200mg x1, followed by 100 mg daily for a 5-day course.  Stop date 6/26/2020  -Monitor LFTs.  AST mildly elevated today (54)  -Convalescent plasma given on 6/21/2020 and 6/24/2020  -Follow blood cultures " from 6/20, no growth till date  -Agree with 5-day course of empiric antibiotics given elevated procalcitonin. Stop date 06/25/2020  -Continue therapeutic anticoagulation given elevated d-dimer and increased risk for thromboembolic disease in COVID-19  -On dexamethasone 6mg daily per pulmonary recommendation    -Inflammatory markers:  CRP: 4.14--> 4.27-->3.83  Ferritin:3152--> 3132-->2934  LDH: 370--> 401-->463  D-dimer: 0.75--> 1.10-->0.53  Procalcitonin: 0.41  IL-6: < 2.0    Will recheck above labs and monitor every 48 hours     Hypertension    I have performed a physical exam and reviewed and updated ROS and plan today 6/25/2020.  In review of yesterday's note 6/24/2020, there are no changes except as documented above.       Discussed with Dr. Xiong

## 2020-06-25 NOTE — PROGRESS NOTES
Hospital Medicine Daily Progress Note    Date of Service  6/25/2020    Chief Complaint  49 y.o. male admitted 6/20/2020 with fever and SOB    Hospital Course    PMHx HTN.  Presenting with cough and SOB.  outpt COVID neg on 6/15.  He does however have known + exposure to family members.  Sx's developed and worsened over 5 days. In ED temp 102 and 87% on RA.   CXR showing B patchy infiltrates.  Admitted on C3/Zithro for community acquired pneumonia and acute hypoxic resp failure      Interval Problem Update    Ongoing shortness of breath in prone position, improving  91% on 4 L oxygen, decreasing inflammatory markers  Patient feels better  Discussed with pulmonary    Reports right arm rash at site of serum infusion, now resolved, no associated fever or chills pruritus    Consultants/Specialty  ID  Pulmonary    Code Status  full    Disposition    TBD  Monitor closely, if malaise, or decompensation, transfer to ICU for closer monitoring,  And ? Need for intubation  D/w staff, continue self prone    Review of Systems  Review of Systems   Constitutional: Negative for diaphoresis and malaise/fatigue.   HENT: Negative for nosebleeds.    Respiratory: Positive for cough and shortness of breath. Negative for sputum production and wheezing.    Cardiovascular: Negative for chest pain, palpitations and leg swelling.   Gastrointestinal: Negative for abdominal pain, constipation, diarrhea and nausea.   Genitourinary: Negative for dysuria and urgency.   Musculoskeletal: Negative for back pain and myalgias.   Skin: Negative for itching and rash.   Neurological: Positive for weakness. Negative for dizziness, speech change, focal weakness and headaches.   Psychiatric/Behavioral: Negative for depression. The patient is not nervous/anxious.         Physical Exam  Temp:  [35.9 °C (96.7 °F)-37.2 °C (99 °F)] 35.9 °C (96.7 °F)  Pulse:  [86-92] 87  Resp:  [17-24] 18  BP: (110-137)/(71-80) 110/73  SpO2:  [88 %-100 %] 93 %    Physical  Exam  Vitals signs reviewed.   Constitutional:       General: He is not in acute distress.     Appearance: He is well-developed. He is not ill-appearing, toxic-appearing or diaphoretic.   HENT:      Head: Normocephalic and atraumatic.   Eyes:      Conjunctiva/sclera: Conjunctivae normal.   Neck:      Vascular: No JVD.   Cardiovascular:      Rate and Rhythm: Normal rate.      Heart sounds: No murmur. No gallop.    Pulmonary:      Effort: Respiratory distress present.      Breath sounds: No stridor. No wheezing or rales.      Comments: Diminished breath sounds through out    Chest:      Chest wall: No tenderness.   Abdominal:      General: There is no distension.      Palpations: Abdomen is soft.      Tenderness: There is no rebound.   Musculoskeletal:         General: No swelling or tenderness.   Skin:     General: Skin is warm.      Coloration: Skin is not jaundiced or pale.   Neurological:      General: No focal deficit present.      Mental Status: He is oriented to person, place, and time. Mental status is at baseline.      Cranial Nerves: No cranial nerve deficit.      Sensory: No sensory deficit.      Motor: Weakness present.   Psychiatric:         Behavior: Behavior normal.         Thought Content: Thought content normal.         Fluids    Intake/Output Summary (Last 24 hours) at 6/25/2020 1218  Last data filed at 6/25/2020 0900  Gross per 24 hour   Intake 2276 ml   Output --   Net 2276 ml       Laboratory  Recent Labs     06/23/20  0321 06/24/20  0224 06/25/20  0405   WBC 5.1 4.7* 2.6*   RBC 4.85 4.57* 5.01   HEMOGLOBIN 13.1* 13.0* 13.5*   HEMATOCRIT 40.8* 38.4* 42.3   MCV 84.1 84.0 84.4   MCH 27.0 28.4 26.9*   MCHC 32.1* 33.9 31.9*   RDW 42.7 42.2 42.2   PLATELETCT 168 188 224   MPV 9.7 10.4 9.5     Recent Labs     06/23/20  0321 06/24/20  0224   SODIUM 135 135   POTASSIUM 4.0 4.0   CHLORIDE 100 102   CO2 21 22   GLUCOSE 113* 112*   BUN 21 18   CREATININE 1.06 0.84   CALCIUM 8.3* 8.1*     Recent Labs      06/23/20  0321 06/25/20  0405   APTT 27.8 37.4*   INR 1.01 1.07               Imaging  DX-CHEST-PORTABLE (1 VIEW)   Final Result         Patchy bilateral lower lung opacities, atelectasis or multifocal pneumonia.           Assessment/Plan  * Pneumonia due to COVID-19 virus- (present on admission)  Assessment & Plan  DC tele  Wean 02 as tolerated  Cont with empiric IV abx   Follow up covid 19 labs ordered   Blood cx neg  Remdesivir D2  S/p convalescent plasma 6/21  ID following   Clinically is improved    6/23 DDimer>1: increaseing crp, change to full dose anticoagulation    6/24 increasing sob with tachypnea, on 4L face mask sat 90%  ABG 7.41/ PO2 20  pulm consulted, Dr. Bautista, appreciate input  - CT PE ordered    6/25 sob improving, now neutropenic  Improving CRP and Ddimer  -LDH elevated, monitor  On decadron per pulm x 10 days or dc  - repeat convalescent serum, ID to assist  - on Remdesivir    Per pulm, ABG, PO2 reviewed      Reactive airway disease with acute exacerbation  Assessment & Plan  Pt states he only has sx's with exercise or with his allergies.  Start scheduled BD therapy  Holding on steroids but will have a low thershold to initiate if any worsening of his COVID disease or worsening of his resp exam    Abnormal LFTs  Assessment & Plan  Mild, likely due to covid   Cont ot trend    Hyponatremia  Assessment & Plan  Mild, cont to trend    Acute hypoxemic respiratory failure (HCC)  Assessment & Plan  Wean 02 as tolerated  COVID +, Hx reactive airway disease, possible bacterial superinfection    BMI 40.0-44.9, adult (HCC)- (present on admission)  Assessment & Plan  Encourage weight loss    HTN (hypertension), benign- (present on admission)  Assessment & Plan  Resume home CCB, BB and ARB with parameters       VTE prophylaxis: enoxaparin

## 2020-06-25 NOTE — CARE PLAN
Problem: Knowledge Deficit  Goal: Knowledge of disease process/condition, treatment plan, diagnostic tests, and medications will improve  Outcome: PROGRESSING AS EXPECTED  Intervention: Assess knowledge level of disease process/condition, treatment plan, diagnostic tests, and medications  Note: Understands plan of care     Problem: Respiratory:  Goal: Respiratory status will improve  Outcome: PROGRESSING AS EXPECTED  Intervention: Administer and titrate oxygen therapy  Note: Stable O2 levels, remains on 4L oxymask

## 2020-06-25 NOTE — CARE PLAN
Problem: Communication  Goal: The ability to communicate needs accurately and effectively will improve  Outcome: PROGRESSING AS EXPECTED     Problem: Knowledge Deficit  Goal: Knowledge of disease process/condition, treatment plan, diagnostic tests, and medications will improve  Outcome: PROGRESSING AS EXPECTED      Patient has been updated on plan of care. Call light is in reach and is used appropriately. Will continue to encourage patient to call if needing assistance.

## 2020-06-25 NOTE — PROGRESS NOTES
Pulmonary follow up for COVID infection    Unchanged oxygen needs today - saturating at 93% on 4l NC  Ferritin and CRP improved  Started dexamethasone 6/24  Remdesivir to complete 6/26  2 doses of plasma given  On full dose anticoagulation    Subjectively improved  Awake proning more now.    Overall not worsened.  Continue current care    D/w Dr. Hull

## 2020-06-26 LAB
ALBUMIN SERPL BCP-MCNC: 3 G/DL (ref 3.2–4.9)
ALBUMIN/GLOB SERPL: 0.9 G/DL
ALP SERPL-CCNC: 48 U/L (ref 30–99)
ALT SERPL-CCNC: 64 U/L (ref 2–50)
ANION GAP SERPL CALC-SCNC: 10 MMOL/L (ref 7–16)
AST SERPL-CCNC: 55 U/L (ref 12–45)
BACTERIA BLD CULT: NORMAL
BACTERIA BLD CULT: NORMAL
BASOPHILS # BLD AUTO: 0.2 % (ref 0–1.8)
BASOPHILS # BLD: 0.01 K/UL (ref 0–0.12)
BILIRUB SERPL-MCNC: 0.5 MG/DL (ref 0.1–1.5)
BUN SERPL-MCNC: 16 MG/DL (ref 8–22)
CALCIUM SERPL-MCNC: 8.6 MG/DL (ref 8.5–10.5)
CHLORIDE SERPL-SCNC: 104 MMOL/L (ref 96–112)
CO2 SERPL-SCNC: 20 MMOL/L (ref 20–33)
CREAT SERPL-MCNC: 0.64 MG/DL (ref 0.5–1.4)
EOSINOPHIL # BLD AUTO: 0 K/UL (ref 0–0.51)
EOSINOPHIL NFR BLD: 0 % (ref 0–6.9)
ERYTHROCYTE [DISTWIDTH] IN BLOOD BY AUTOMATED COUNT: 41.9 FL (ref 35.9–50)
GLOBULIN SER CALC-MCNC: 3.4 G/DL (ref 1.9–3.5)
GLUCOSE SERPL-MCNC: 135 MG/DL (ref 65–99)
HCT VFR BLD AUTO: 42.4 % (ref 42–52)
HGB BLD-MCNC: 13.8 G/DL (ref 14–18)
IL6 SERPL-MCNC: <2 PG/ML
IMM GRANULOCYTES # BLD AUTO: 0.05 K/UL (ref 0–0.11)
IMM GRANULOCYTES NFR BLD AUTO: 0.8 % (ref 0–0.9)
LYMPHOCYTES # BLD AUTO: 1.11 K/UL (ref 1–4.8)
LYMPHOCYTES NFR BLD: 17 % (ref 22–41)
MCH RBC QN AUTO: 27.2 PG (ref 27–33)
MCHC RBC AUTO-ENTMCNC: 32.5 G/DL (ref 33.7–35.3)
MCV RBC AUTO: 83.6 FL (ref 81.4–97.8)
MONOCYTES # BLD AUTO: 0.67 K/UL (ref 0–0.85)
MONOCYTES NFR BLD AUTO: 10.3 % (ref 0–13.4)
NEUTROPHILS # BLD AUTO: 4.69 K/UL (ref 1.82–7.42)
NEUTROPHILS NFR BLD: 71.7 % (ref 44–72)
NRBC # BLD AUTO: 0 K/UL
NRBC BLD-RTO: 0 /100 WBC
PLATELET # BLD AUTO: 227 K/UL (ref 164–446)
PMV BLD AUTO: 10.4 FL (ref 9–12.9)
POTASSIUM SERPL-SCNC: 4.5 MMOL/L (ref 3.6–5.5)
PROT SERPL-MCNC: 6.4 G/DL (ref 6–8.2)
RBC # BLD AUTO: 5.07 M/UL (ref 4.7–6.1)
SIGNIFICANT IND 70042: NORMAL
SIGNIFICANT IND 70042: NORMAL
SITE SITE: NORMAL
SITE SITE: NORMAL
SODIUM SERPL-SCNC: 134 MMOL/L (ref 135–145)
SOURCE SOURCE: NORMAL
SOURCE SOURCE: NORMAL
WBC # BLD AUTO: 6.5 K/UL (ref 4.8–10.8)

## 2020-06-26 PROCEDURE — 36415 COLL VENOUS BLD VENIPUNCTURE: CPT

## 2020-06-26 PROCEDURE — 80053 COMPREHEN METABOLIC PANEL: CPT

## 2020-06-26 PROCEDURE — A9270 NON-COVERED ITEM OR SERVICE: HCPCS | Performed by: INTERNAL MEDICINE

## 2020-06-26 PROCEDURE — 85025 COMPLETE CBC W/AUTO DIFF WBC: CPT

## 2020-06-26 PROCEDURE — 99232 SBSQ HOSP IP/OBS MODERATE 35: CPT | Mod: CS | Performed by: INTERNAL MEDICINE

## 2020-06-26 PROCEDURE — 700102 HCHG RX REV CODE 250 W/ 637 OVERRIDE(OP): Performed by: HOSPITALIST

## 2020-06-26 PROCEDURE — 94760 N-INVAS EAR/PLS OXIMETRY 1: CPT

## 2020-06-26 PROCEDURE — 770021 HCHG ROOM/CARE - ISO PRIVATE

## 2020-06-26 PROCEDURE — A9270 NON-COVERED ITEM OR SERVICE: HCPCS | Performed by: HOSPITALIST

## 2020-06-26 PROCEDURE — 700102 HCHG RX REV CODE 250 W/ 637 OVERRIDE(OP): Performed by: INTERNAL MEDICINE

## 2020-06-26 PROCEDURE — 700111 HCHG RX REV CODE 636 W/ 250 OVERRIDE (IP): Performed by: INTERNAL MEDICINE

## 2020-06-26 PROCEDURE — 99233 SBSQ HOSP IP/OBS HIGH 50: CPT | Mod: CS | Performed by: INTERNAL MEDICINE

## 2020-06-26 PROCEDURE — 94640 AIRWAY INHALATION TREATMENT: CPT

## 2020-06-26 RX ADMIN — ALBUTEROL SULFATE 2 PUFF: 90 AEROSOL, METERED RESPIRATORY (INHALATION) at 06:38

## 2020-06-26 RX ADMIN — DEXAMETHASONE 6 MG: 6 TABLET ORAL at 05:53

## 2020-06-26 RX ADMIN — ENOXAPARIN SODIUM 120 MG: 120 INJECTION SUBCUTANEOUS at 17:09

## 2020-06-26 RX ADMIN — METOPROLOL TARTRATE 25 MG: 25 TABLET, FILM COATED ORAL at 05:52

## 2020-06-26 RX ADMIN — ALBUTEROL SULFATE 2 PUFF: 90 AEROSOL, METERED RESPIRATORY (INHALATION) at 14:38

## 2020-06-26 RX ADMIN — LOSARTAN POTASSIUM 50 MG: 50 TABLET, FILM COATED ORAL at 05:52

## 2020-06-26 RX ADMIN — ENOXAPARIN SODIUM 120 MG: 120 INJECTION SUBCUTANEOUS at 05:52

## 2020-06-26 RX ADMIN — ALBUTEROL SULFATE 2 PUFF: 90 AEROSOL, METERED RESPIRATORY (INHALATION) at 20:11

## 2020-06-26 RX ADMIN — AMLODIPINE BESYLATE 10 MG: 5 TABLET ORAL at 05:52

## 2020-06-26 RX ADMIN — METOPROLOL TARTRATE 25 MG: 25 TABLET, FILM COATED ORAL at 17:10

## 2020-06-26 RX ADMIN — ALBUTEROL SULFATE 2 PUFF: 90 AEROSOL, METERED RESPIRATORY (INHALATION) at 10:30

## 2020-06-26 ASSESSMENT — ENCOUNTER SYMPTOMS
HEMOPTYSIS: 0
CHILLS: 0
CARDIOVASCULAR NEGATIVE: 1
DIAPHORESIS: 0
MYALGIAS: 0
HEADACHES: 0
WEAKNESS: 1
HEARTBURN: 0
SPEECH CHANGE: 0
MUSCULOSKELETAL NEGATIVE: 1
PSYCHIATRIC NEGATIVE: 1
SPUTUM PRODUCTION: 0
NAUSEA: 0
COUGH: 0
COUGH: 1
DIARRHEA: 0
ABDOMINAL PAIN: 0
FOCAL WEAKNESS: 0
VOMITING: 0
SHORTNESS OF BREATH: 1
PALPITATIONS: 0
MYALGIAS: 1
GASTROINTESTINAL NEGATIVE: 1
WHEEZING: 0
FEVER: 0
DIZZINESS: 0
EYES NEGATIVE: 1
NERVOUS/ANXIOUS: 0

## 2020-06-26 NOTE — CARE PLAN
Problem: Bronchoconstriction:  Goal: Improve in air movement and diminished wheezing  Outcome: PROGRESSING AS EXPECTED   Albuterol inhaler QID  Patient receiving 6 LPM via Oxymask

## 2020-06-26 NOTE — CARE PLAN
Problem: Nutritional:  Goal: Achieve adequate nutritional intake  Description: Patient will consume >50% of meals/supplements.  Outcome: PROGRESSING AS EXPECTED  Per chart pt PO varies 0-100%, however recent 3 meals documented at %. Pt continues to receive magic cup with breakfast and Boost Plus with dinner. RD will continue to follow.

## 2020-06-26 NOTE — CARE PLAN
Problem: Safety  Goal: Will remain free from injury  Outcome: PROGRESSING AS EXPECTED  Note: Safety precautions in place. Bed in lowest and locked position. Call light and personal belongings within reach      Problem: Infection  Goal: Will remain free from infection  Outcome: PROGRESSING AS EXPECTED  Note: Droplet and contact precautions in place

## 2020-06-26 NOTE — PROGRESS NOTES
[facility] Medicine Daily Progress Note    Date of Service  6/26/2020    Chief Complaint  49 y.o. male admitted 6/20/2020 with fever and SOB    Hospital Course    PMHx HTN.  Presenting with cough and SOB.  outpt COVID neg on 6/15.  He does however have known + exposure to family members.  Sx's developed and worsened over 5 days. In ED temp 102 and 87% on RA.   CXR showing B patchy infiltrates.  Admitted on C3/Zithro for community acquired pneumonia and acute hypoxic resp failure      Interval Problem Update    Found in improving position, reports improving shortness of breath  Improving respiration throughout all lung fields  No expiratory wheezing noted  Reports slightly improving appetite  Encourage oral intake    Consultants/Specialty  ID  Pulmonary    Code Status  full    Disposition    TBD  Monitor closely, if malaise, or decompensation, transfer to ICU for closer monitoring,  And ? Need for intubation  D/w staff, continue self prone  Monitor inflammatory markers     Review of Systems  Review of Systems   Constitutional: Negative for chills, fever and malaise/fatigue.   Respiratory: Positive for shortness of breath. Negative for cough, sputum production and wheezing.    Cardiovascular: Negative for chest pain, palpitations and leg swelling.   Gastrointestinal: Negative for abdominal pain, heartburn and nausea.   Genitourinary: Negative for dysuria and urgency.   Musculoskeletal: Negative for myalgias.   Skin: Negative for itching and rash.   Neurological: Positive for weakness. Negative for speech change, focal weakness and headaches.   Psychiatric/Behavioral: The patient is not nervous/anxious.         Physical Exam  Temp:  [36.1 °C (97 °F)-36.2 °C (97.2 °F)] 36.2 °C (97.2 °F)  Pulse:  [78-98] 79  Resp:  [16-30] 18  BP: (120-132)/(72-80) 122/75  SpO2:  [90 %-96 %] 90 %    Physical Exam  Vitals signs reviewed.   Constitutional:       General: He is not in acute distress.     Appearance: He is well-developed. He  is ill-appearing. He is not toxic-appearing.   HENT:      Head: Normocephalic and atraumatic.   Eyes:      Conjunctiva/sclera: Conjunctivae normal.   Neck:      Vascular: No JVD.   Cardiovascular:      Rate and Rhythm: Normal rate.      Heart sounds: No murmur. No gallop.    Pulmonary:      Effort: Respiratory distress present.      Breath sounds: No wheezing.      Comments: Improving breath sounds, no expiratory wheezes noted    Abdominal:      General: There is no distension.      Palpations: Abdomen is soft.      Tenderness: There is no abdominal tenderness. There is no guarding.   Musculoskeletal:         General: No swelling or tenderness.   Skin:     General: Skin is warm.      Coloration: Skin is not jaundiced or pale.   Neurological:      General: No focal deficit present.      Mental Status: He is oriented to person, place, and time. Mental status is at baseline.      Cranial Nerves: No cranial nerve deficit.      Motor: Weakness present.   Psychiatric:         Behavior: Behavior normal.         Thought Content: Thought content normal.         Fluids    Intake/Output Summary (Last 24 hours) at 6/26/2020 1213  Last data filed at 6/26/2020 1000  Gross per 24 hour   Intake 590 ml   Output --   Net 590 ml       Laboratory  Recent Labs     06/24/20 0224 06/25/20  0405 06/26/20  0521   WBC 4.7* 2.6* 6.5   RBC 4.57* 5.01 5.07   HEMOGLOBIN 13.0* 13.5* 13.8*   HEMATOCRIT 38.4* 42.3 42.4   MCV 84.0 84.4 83.6   MCH 28.4 26.9* 27.2   MCHC 33.9 31.9* 32.5*   RDW 42.2 42.2 41.9   PLATELETCT 188 224 227   MPV 10.4 9.5 10.4     Recent Labs     06/24/20 0224 06/26/20  0521   SODIUM 135 134*   POTASSIUM 4.0 4.5   CHLORIDE 102 104   CO2 22 20   GLUCOSE 112* 135*   BUN 18 16   CREATININE 0.84 0.64   CALCIUM 8.1* 8.6     Recent Labs     06/25/20  0405   APTT 37.4*   INR 1.07               Imaging  DX-CHEST-PORTABLE (1 VIEW)   Final Result         Patchy bilateral lower lung opacities, atelectasis or multifocal pneumonia.            Assessment/Plan  * Pneumonia due to COVID-19 virus- (present on admission)  Assessment & Plan  DC tele  Wean 02 as tolerated  Cont with empiric IV abx   Follow up covid 19 labs ordered   Blood cx neg  Remdesivir D2  S/p convalescent plasma 6/21  ID following   Clinically is improved    6/23 DDimer>1: increaseing crp, change to full dose anticoagulation    6/24 increasing sob with tachypnea, on 4L face mask sat 90%  ABG 7.41/ PO2 20  pulm consulted, Dr. Bautista, appreciate input  - CT PE ordered    6/25 sob improving, now neutropenic  Improving CRP and Ddimer  -LDH elevated, monitor  On decadron per pulm x 10 days or dc  - repeat convalescent serum, ID to assist  - on Remdesivir    Per pulm, ABG, PO2 reviewed    6/26 monitor CRP, d-dimer and LDH to 48 hours  Respiratory symptoms improving, intermittently requiring 4 to 6 L oxygen supplementation  Clinically improving  Monitor closely  Encourage I-S use  Status post plasma x2, completed Remdesivir on June 25      Reactive airway disease with acute exacerbation  Assessment & Plan  Pt states he only has sx's with exercise or with his allergies.  Start scheduled BD therapy  Holding on steroids but will have a low thershold to initiate if any worsening of his COVID disease or worsening of his resp exam    Abnormal LFTs  Assessment & Plan  Mild, likely due to covid   Cont ot trend    Hyponatremia  Assessment & Plan  Mild, cont to trend    Acute hypoxemic respiratory failure (HCC)  Assessment & Plan  Wean 02 as tolerated  COVID +, Hx reactive airway disease, possible bacterial superinfection    BMI 40.0-44.9, adult (HCC)- (present on admission)  Assessment & Plan  Encourage weight loss    HTN (hypertension), benign- (present on admission)  Assessment & Plan  Resume home CCB, BB and ARB with parameters       VTE prophylaxis: enoxaparin

## 2020-06-26 NOTE — PROGRESS NOTES
Pulmonary Progress Note    Date of admission  6/20/2020    Chief Complaint  49 y.o. male admitted 6/20/2020 with SOB    Hospital Course    49 y.o. male who presented 6/20/2020 with COVID 19  Given convalescent plasma 6/21  Started remdemsivir 6/21  Lovenox 120 mg sc bid  He has been on 4l/NC saturating 92%-94%, minimal awake proning due to patient reluctance.     6/20, 6/23: IL6 <2, 3.6  6/20, 6/23 6/25: Ferritin 3152, 3132, 2934  6/20, 6/23 6/25: CRP 4.14, 4.27, 3.83  6/20, 6/23 6/25: D dimer: 0.75, 1.1, 0.53      Interval Problem Update  Reviewed last 24 hour events:  o2 requirement 4-6 l/min  Subjectively better    Review of Systems  Review of Systems   Constitutional: Positive for malaise/fatigue.   HENT: Negative.    Eyes: Negative.    Respiratory: Positive for shortness of breath.         Improved   Cardiovascular: Negative.    Gastrointestinal: Negative.    Genitourinary: Negative.    Musculoskeletal: Negative.    Skin: Negative.    Neurological: Positive for weakness.   Endo/Heme/Allergies: Negative.    Psychiatric/Behavioral: Negative.         Vital Signs for last 24 hours   Temp:  [36.1 °C (97 °F)-36.2 °C (97.2 °F)] 36.1 °C (97 °F)  Pulse:  [78-98] 87  Resp:  [18-30] 19  BP: (120-143)/(72-75) 143/75  SpO2:  [90 %-98 %] 97 %     Physical Exam   Physical Exam  Constitutional:       Appearance: He is obese. He is ill-appearing.   HENT:      Head: Normocephalic and atraumatic.      Mouth/Throat:      Mouth: Mucous membranes are dry.   Eyes:      Extraocular Movements: Extraocular movements intact.      Pupils: Pupils are equal, round, and reactive to light.   Cardiovascular:      Rate and Rhythm: Normal rate.      Heart sounds: No murmur.   Pulmonary:      Effort: Pulmonary effort is normal.   Abdominal:      Palpations: Abdomen is soft.   Musculoskeletal:         General: No swelling.   Skin:     General: Skin is warm and dry.   Neurological:      General: No focal deficit present.   Psychiatric:         Mood  and Affect: Mood normal.         Behavior: Behavior normal.         Thought Content: Thought content normal.         Judgment: Judgment normal.         Medications  Current Facility-Administered Medications   Medication Dose Route Frequency Provider Last Rate Last Dose   • albuterol inhaler 2 Puff  2 Puff Inhalation 4X/DAY (RT) Eryn Hull D.O.   2 Puff at 06/26/20 1438   • albuterol inhaler 2 Puff  2 Puff Inhalation Q4H PRN (RT) Eryn Hull D.O.       • dexamethasone (DECADRON) tablet 6 mg  6 mg Oral DAILY Fanny Beal M.D.   6 mg at 06/26/20 0553   • enoxaparin (LOVENOX) inj 120 mg  1 mg/kg Subcutaneous Q12HRS Eryn Hull D.O.   120 mg at 06/26/20 1709   • ondansetron (ZOFRAN) syringe/vial injection 4 mg  4 mg Intravenous Q6HRS PRN Murali Ohara M.D.       • ondansetron (ZOFRAN ODT) dispertab 4 mg  4 mg Oral Q6HRS PRN Murali Ohara M.D.       • amLODIPine (NORVASC) tablet 10 mg  10 mg Oral Q DAY Martin Mata D.O.   10 mg at 06/26/20 0552   • losartan (COZAAR) tablet 50 mg  50 mg Oral Q DAY Martin Mata D.O.   50 mg at 06/26/20 0552   • metoprolol (LOPRESSOR) tablet 25 mg  25 mg Oral TWICE DAILY Martin Mata D.O.   25 mg at 06/26/20 1710   • acetaminophen (TYLENOL) tablet 650 mg  650 mg Oral Q4HRS PRN Murali Ohara M.D.   650 mg at 06/23/20 1553       Fluids    Intake/Output Summary (Last 24 hours) at 6/26/2020 1725  Last data filed at 6/26/2020 1700  Gross per 24 hour   Intake 830 ml   Output --   Net 830 ml       Laboratory  Recent Labs     06/24/20  1238   UEEGW12S 7.40   [id number] 38.8*   URXPG524I 20.7*   [id number] 31.1*   ARTHCO3 23   ARTBE -1     Recent Labs     06/25/20 0405   CPKTOTAL 568*     Recent Labs     06/24/20  0224 06/25/20 0405 06/26/20  0521   SODIUM 135  --  134*   POTASSIUM 4.0  --  4.5   CHLORIDE 102  --  104   CO2 22  --  20   BUN 18  --  16   CREATININE 0.84  --  0.64   MAGNESIUM  --  2.3  --    CALCIUM 8.1*  --  8.6     Recent  Labs     06/24/20 0224 06/25/20 0405 06/26/20  0521   ALTSGPT 41 48 64*   ASTSGOT 51* 54* 55*   ALKPHOSPHAT 42 48 48   TBILIRUBIN 0.4 0.5 0.5   DBILIRUBIN  --  <0.2  --    GLUCOSE 112*  --  135*     Recent Labs     06/24/20 0224 06/25/20 0405 06/26/20  0521   WBC 4.7* 2.6* 6.5   NEUTSPOLYS 67.00 65.60 71.70   LYMPHOCYTES 23.50 28.10 17.00*   MONOCYTES 8.10 5.10 10.30   EOSINOPHILS 0.40 0.00 0.00   BASOPHILS 0.20 0.00 0.20   ASTSGOT 51* 54* 55*   ALTSGPT 41 48 64*   ALKPHOSPHAT 42 48 48   TBILIRUBIN 0.4 0.5 0.5     Recent Labs     06/24/20 0224 06/25/20 0405 06/26/20  0521   RBC 4.57* 5.01 5.07   HEMOGLOBIN 13.0* 13.5* 13.8*   HEMATOCRIT 38.4* 42.3 42.4   PLATELETCT 188 224 227   PROTHROMBTM  --  14.3  --    APTT  --  37.4*  --    INR  --  1.07  --    FERRITIN  --  2934.0*  --        Imaging  No new imaging    Assessment/Plan  * Pneumonia due to COVID-19 virus- (present on admission)  Assessment & Plan  Persistent need of oxygen 4-6 liters  But subjectively improved and clinically better  ON dexamethasone day 3/10  Remdisivir day 5/5  On full dose lovenox  S/p 2 doses of plasma   Continue 16 hr awake proning encourage pt  +6600 since admit would consider lasix if O2 needs remain the same in the next 24 hrs           Reviewed with Dr. Johnston

## 2020-06-26 NOTE — CARE PLAN
Problem: Communication  Goal: The ability to communicate needs accurately and effectively will improve  Outcome: PROGRESSING AS EXPECTED     Problem: Venous Thromboembolism (VTW)/Deep Vein Thrombosis (DVT) Prevention:  Goal: Patient will participate in Venous Thrombosis (VTE)/Deep Vein Thrombosis (DVT)Prevention Measures  Outcome: PROGRESSING AS EXPECTED      Patient uses the call light appropriately. He has his call light in reach. Patient ambulates throughout the shift and takes lovenox for thrombosis prevention.

## 2020-06-26 NOTE — PROGRESS NOTES
Infectious Disease Progress Note    Author: Clayton Johnston D.O. Date & Time of service: 2020  9:22 AM    Chief Complaint:  Follow-up for COVID-19 pneumonia    Interval History:   T-max 99.5 no CBC done today.  Fever curve slightly improved.  Down to 4 L nasal cannula.  Received convalescent plasma and first dose of remdesivir yesterday and tolerated them.  Feels as if his fevers are improving.  Still struggles with shortness of breath   afebrile WBC 5.1, oxygen requirements stable.  States his fevers have resolved.  He continues to have shortness of breath with minimal exertion.  His nonproductive cough is also more consistent.  No diarrhea.   afebrile WBC 4.7.  Remains on 5 L nasal cannula without any changes. Breathing slowly improving and he states he has more energy. Poor appetite   AFebrile, WBC 2.6 received 2nd unit of plasma yesterday and had some hives on his left arm which have now resolved. On 4 liters today.  Patient is prone and states when he moves into a sitting position he has some trouble breathing.  He also states that food taste salty to him, even his ice cream.  CTA chest canceled   Afebrile. On 6L NC, HR in 70-80s, SBP in 120-130s. WBC 6.5, plt 227K. Creatinine 0.64. ALT/AST stable in 50s/60s  ,     Labs Reviewed and Medications Reviewed.    Review of Systems:  Review of Systems   Constitutional: Positive for malaise/fatigue. Negative for diaphoresis and fever.   Respiratory: Positive for cough and shortness of breath. Negative for hemoptysis.         Slowly improving   Cardiovascular: Negative for chest pain.   Gastrointestinal: Negative for abdominal pain, diarrhea, nausea and vomiting.   Musculoskeletal: Positive for myalgias.   Neurological: Negative for dizziness and headaches.       Hemodynamics:  Temp (24hrs), Av.2 °C (97.1 °F), Min:36.1 °C (97 °F), Max:36.2 °C (97.2 °F)  Temperature: 36.2 °C (97.2 °F)  Pulse  Av.2  Min: 71  Max: 107   Blood  Pressure: 122/75       Physical Exam:  Physical Exam  Constitutional:       Appearance: He is obese. He is ill-appearing.   HENT:      Mouth/Throat:      Mouth: Mucous membranes are moist.      Pharynx: No oropharyngeal exudate.   Eyes:      Extraocular Movements: Extraocular movements intact.      Conjunctiva/sclera: Conjunctivae normal.      Pupils: Pupils are equal, round, and reactive to light.   Neck:      Musculoskeletal: Normal range of motion and neck supple.   Cardiovascular:      Rate and Rhythm: Regular rhythm. Tachycardia present.   Pulmonary:      Effort: Pulmonary effort is normal.   Abdominal:      Palpations: Abdomen is soft.      Tenderness: There is no abdominal tenderness.   Musculoskeletal: Normal range of motion.   Skin:     General: Skin is warm and dry.   Neurological:      General: No focal deficit present.      Mental Status: He is alert and oriented to person, place, and time.      Cranial Nerves: No cranial nerve deficit.   Psychiatric:         Mood and Affect: Mood normal.         Behavior: Behavior normal.         Meds:    Current Facility-Administered Medications:   •  albuterol  •  albuterol  •  dexamethasone  •  enoxaparin (LOVENOX) injection  •  ondansetron  •  ondansetron  •  amLODIPine  •  losartan  •  metoprolol  •  acetaminophen    Labs:  Recent Labs     06/24/20 0224 06/25/20 0405 06/26/20  0521   WBC 4.7* 2.6* 6.5   RBC 4.57* 5.01 5.07   HEMOGLOBIN 13.0* 13.5* 13.8*   HEMATOCRIT 38.4* 42.3 42.4   MCV 84.0 84.4 83.6   MCH 28.4 26.9* 27.2   RDW 42.2 42.2 41.9   PLATELETCT 188 224 227   MPV 10.4 9.5 10.4   NEUTSPOLYS 67.00 65.60 71.70   LYMPHOCYTES 23.50 28.10 17.00*   MONOCYTES 8.10 5.10 10.30   EOSINOPHILS 0.40 0.00 0.00   BASOPHILS 0.20 0.00 0.20     Recent Labs     06/24/20 0224 06/25/20  0405 06/26/20  0521   SODIUM 135  --  134*   POTASSIUM 4.0  --  4.5   CHLORIDE 102  --  104   CO2 22  --  20   GLUCOSE 112*  --  135*   BUN 18  --  16   CPKTOTAL  --  568*  --      Recent  "Labs     06/24/20  0224 06/25/20  0405 06/26/20  0521   ALBUMIN 3.4 3.3 3.0*   TBILIRUBIN 0.4 0.5 0.5   ALKPHOSPHAT 42 48 48   TOTPROTEIN 6.5 6.6 6.4   ALTSGPT 41 48 64*   ASTSGOT 51* 54* 55*   CREATININE 0.84  --  0.64       Imaging:  Dx-chest-portable (1 View)    Result Date: 6/20/2020 6/20/2020 11:28 PM HISTORY/REASON FOR EXAM:  Shortness of Breath Fever. Suspect possible Covid-19 exposure rule out TECHNIQUE/EXAM DESCRIPTION AND NUMBER OF VIEWS: Single portable view of the chest. COMPARISON: None FINDINGS: Patchy bilateral lower lung opacities No pleural effusion. No pneumothorax. Normal cardiopericardial silhouette.     Patchy bilateral lower lung opacities, atelectasis or multifocal pneumonia.      Micro:  Results     Procedure Component Value Units Date/Time    Blood Culture [[id number]] Collected:  06/20/20 2321    Order Status:  Completed Specimen:  Blood from Peripheral Updated:  06/26/20 0100     Significant Indicator NEG     Source BLD     Site PERIPHERAL     Culture Result No growth after 5 days of incubation.    Narrative:       Droplet, Contact, and Eye Protection  2 of 2 blood culture x2  Sites order. Per Hospital Policy:  Only change Specimen Src: to \"Line\" if specified by physician  order.  No site indicated    Blood Culture [[id number]] Collected:  06/20/20 2321    Order Status:  Completed Specimen:  Blood from Peripheral Updated:  06/26/20 0100     Significant Indicator NEG     Source BLD     Site PERIPHERAL     Culture Result No growth after 5 days of incubation.    Narrative:       Droplet, Contact, and Eye Protection  1 of 2 for Blood Culture x 2 sites order. Per Hospital  Policy: Only change Specimen Src: to \"Line\" if specified by  physician order.  No site indicated    SARS-CoV-2, PCR (In-House) [[id number]]  (Abnormal) Collected:  06/20/20 2325    Order Status:  Completed Updated:  06/21/20 0041     SARS-CoV-2 Source NP Swab     SARS-CoV-2 by PCR DETECTED     Comment: **The [facility] GeneXpert " Xpress SARS-CoV-2 Test has been made available for  use under the Emergency Use Authorization (EUA) only.         Narrative:       Droplet, Contact, and Eye Protection  Rule-out COVID-19 panel, includes droplet/contact/eye  isolation order.  Check influenza to order this test only if  specifically indicated.  Expected Turn around time?->Rush (Cepheid 2-4 hours)    COVID/SARS CoV-2 PCR [[id number]] Collected:  06/20/20 232    Order Status:  Completed Specimen:  Respirate from Nasopharyngeal Updated:  06/20/20 2333     COVID Order Status Received    Narrative:       Droplet, Contact, and Eye Protection  Rule-out COVID-19 panel, includes droplet/contact/eye  isolation order.  Check influenza to order this test only if  specifically indicated.  Expected Turn around time?->Rush (Cepheid 2-4 hours)        Component      Latest Ref Rng & Units 6/20/2020 6/20/2020 6/20/2020 6/20/2020          11:21 PM 11:21 PM 11:21 PM 11:21 PM   Ferritin      22.0 - 322.0 ng/mL       Troponin T      6 - 19 ng/L  14     CPK Total      0 - 154 U/L   556 (H)    D-Dimer Screen      0.00 - 0.50 ug/mL (FEU) 0.75 (H)      Stat C-Reactive Protein      0.00 - 0.75 mg/dL    4.14 (H)   Sed Rate Westergren      0 - 15 mm/hour       LDH Total      107 - 266 U/L         Component      Latest Ref Rng & Units 6/20/2020 6/20/2020 6/20/2020 6/23/2020          11:21 PM 11:21 PM 11:21 PM  3:21 AM   Ferritin      22.0 - 322.0 ng/mL  3152.0 (H)     Troponin T      6 - 19 ng/L       CPK Total      0 - 154 U/L       D-Dimer Screen      0.00 - 0.50 ug/mL (FEU)    1.10 (H)   Stat C-Reactive Protein      0.00 - 0.75 mg/dL       Sed Rate Westergren      0 - 15 mm/hour   20 (H)    LDH Total      107 - 266 U/L 370 (H)        Component      Latest Ref Rng & Units 6/23/2020 6/23/2020 6/23/2020 6/23/2020           3:21 AM  3:21 AM  3:21 AM  3:21 AM   Ferritin      22.0 - 322.0 ng/mL       Troponin T      6 - 19 ng/L    12   CPK Total      0 - 154 U/L 823 (H)      D-Dimer  Screen      0.00 - 0.50 ug/mL (FEU)       Stat C-Reactive Protein      0.00 - 0.75 mg/dL  4.27 (H)     Sed Rate Westergren      0 - 15 mm/hour       LDH Total      107 - 266 U/L   401 (H)      Component      Latest Ref Rng & Units 6/23/2020 6/25/2020 6/25/2020 6/25/2020           3:21 AM  4:05 AM  4:05 AM  4:05 AM   Ferritin      22.0 - 322.0 ng/mL 3132.0 (H)      Troponin T      6 - 19 ng/L       CPK Total      0 - 154 U/L   568 (H)    D-Dimer Screen      0.00 - 0.50 ug/mL (FEU)  0.53 (H)     Stat C-Reactive Protein      0.00 - 0.75 mg/dL    3.83 (H)   Sed Rate Westergren      0 - 15 mm/hour       LDH Total      107 - 266 U/L         Component      Latest Ref Rng & Units 6/25/2020 6/25/2020 6/25/2020           4:05 AM  4:05 AM  4:05 AM   Ferritin      22.0 - 322.0 ng/mL   2934.0 (H)   Troponin T      6 - 19 ng/L  10    CPK Total      0 - 154 U/L      D-Dimer Screen      0.00 - 0.50 ug/mL (FEU)      Stat C-Reactive Protein      0.00 - 0.75 mg/dL      Sed Rate Westergren      0 - 15 mm/hour      LDH Total      107 - 266 U/L 463 (H)       Assessment:  Active Hospital Problems    Diagnosis   • *Pneumonia due to COVID-19 virus [U07.1, J12.89]   • Acute hypoxemic respiratory failure (HCC) [J96.01]   • Hyponatremia [E87.1]   • Abnormal LFTs [R94.5]   • Reactive airway disease with acute exacerbation [J45.901]   • HTN (hypertension), benign [I10]   • BMI 40.0-44.9, adult (HCC) [Z68.41]       Plan:  Acute hypoxic respiratory failure  COVID-19 pneumonia  Fevers resolved  Leukopenic  Overall improving clinically.     -Continue supportive care.  Self proning, oxygen supplementation, judicious use of IV fluids  - 16 hour awake proning  -Continue IV remdesivir 200mg x1, followed by 100 mg daily for a 5-day course.  Stop date 6/26/2020  -Monitor LFTs.  AST mildly elevated today (54)  -Convalescent plasma given on 6/21/2020 and 6/24/2020  -Follow blood cultures from 6/20, no growth till date  -Agree with 5-day course of empiric  antibiotics given elevated procalcitonin. Stop date 06/25/2020  -Continue therapeutic anticoagulation given elevated d-dimer and increased risk for thromboembolic disease in COVID-19  -On dexamethasone 6mg daily for total 10 days     -Inflammatory markers:  CRP: 4.14--> 4.27-->3.83  Ferritin:3152--> 3132-->2934  LDH: 370--> 401-->463  D-dimer: 0.75--> 1.10-->0.53  Procalcitonin: 0.41  IL-6: < 2.0    Will recheck above labs and monitor every 48 hours     I have performed a physical exam and reviewed and updated ROS and plan today 6/26/2020.  In review of yesterday's note 6/25/2020, there are no changes except as documented above.    I called his wife, Alber Neves and updated his condition. All questions are answered.      Discussed with Dr. Bautista

## 2020-06-27 LAB
ALBUMIN SERPL BCP-MCNC: 3.2 G/DL (ref 3.2–4.9)
ALP SERPL-CCNC: 50 U/L (ref 30–99)
ALT SERPL-CCNC: 130 U/L (ref 2–50)
APTT PPP: 38.4 SEC (ref 24.7–36)
AST SERPL-CCNC: 107 U/L (ref 12–45)
BASOPHILS # BLD AUTO: 0.2 % (ref 0–1.8)
BASOPHILS # BLD: 0.02 K/UL (ref 0–0.12)
BILIRUB CONJ SERPL-MCNC: 0.2 MG/DL (ref 0.1–0.5)
BILIRUB INDIRECT SERPL-MCNC: 0.4 MG/DL (ref 0–1)
BILIRUB SERPL-MCNC: 0.6 MG/DL (ref 0.1–1.5)
CK SERPL-CCNC: 173 U/L (ref 0–154)
CRP SERPL HS-MCNC: 0.81 MG/DL (ref 0–0.75)
D DIMER PPP IA.FEU-MCNC: 0.29 UG/ML (FEU) (ref 0–0.5)
EOSINOPHIL # BLD AUTO: 0 K/UL (ref 0–0.51)
EOSINOPHIL NFR BLD: 0 % (ref 0–6.9)
ERYTHROCYTE [DISTWIDTH] IN BLOOD BY AUTOMATED COUNT: 40.5 FL (ref 35.9–50)
FERRITIN SERPL-MCNC: 2924 NG/ML (ref 22–322)
FIBRINOGEN PPP-MCNC: 464 MG/DL (ref 215–460)
HCT VFR BLD AUTO: 44.2 % (ref 42–52)
HGB BLD-MCNC: 14.2 G/DL (ref 14–18)
IMM GRANULOCYTES # BLD AUTO: 0.09 K/UL (ref 0–0.11)
IMM GRANULOCYTES NFR BLD AUTO: 0.9 % (ref 0–0.9)
INR PPP: 1.07 (ref 0.87–1.13)
LDH SERPL L TO P-CCNC: 426 U/L (ref 107–266)
LYMPHOCYTES # BLD AUTO: 0.97 K/UL (ref 1–4.8)
LYMPHOCYTES NFR BLD: 9.5 % (ref 22–41)
MAGNESIUM SERPL-MCNC: 2.3 MG/DL (ref 1.5–2.5)
MCH RBC QN AUTO: 26.7 PG (ref 27–33)
MCHC RBC AUTO-ENTMCNC: 32.1 G/DL (ref 33.7–35.3)
MCV RBC AUTO: 83.1 FL (ref 81.4–97.8)
MONOCYTES # BLD AUTO: 0.66 K/UL (ref 0–0.85)
MONOCYTES NFR BLD AUTO: 6.5 % (ref 0–13.4)
NEUTROPHILS # BLD AUTO: 8.45 K/UL (ref 1.82–7.42)
NEUTROPHILS NFR BLD: 82.9 % (ref 44–72)
NRBC # BLD AUTO: 0 K/UL
NRBC BLD-RTO: 0 /100 WBC
NT-PROBNP SERPL IA-MCNC: 228 PG/ML (ref 0–125)
PLATELET # BLD AUTO: 298 K/UL (ref 164–446)
PMV BLD AUTO: 9.4 FL (ref 9–12.9)
PROT SERPL-MCNC: 6.3 G/DL (ref 6–8.2)
PROTHROMBIN TIME: 14.3 SEC (ref 12–14.6)
RBC # BLD AUTO: 5.32 M/UL (ref 4.7–6.1)
TROPONIN T SERPL-MCNC: 11 NG/L (ref 6–19)
WBC # BLD AUTO: 10.2 K/UL (ref 4.8–10.8)

## 2020-06-27 PROCEDURE — A9270 NON-COVERED ITEM OR SERVICE: HCPCS | Performed by: HOSPITALIST

## 2020-06-27 PROCEDURE — 84484 ASSAY OF TROPONIN QUANT: CPT

## 2020-06-27 PROCEDURE — 94640 AIRWAY INHALATION TREATMENT: CPT

## 2020-06-27 PROCEDURE — 80076 HEPATIC FUNCTION PANEL: CPT

## 2020-06-27 PROCEDURE — 85730 THROMBOPLASTIN TIME PARTIAL: CPT

## 2020-06-27 PROCEDURE — 85025 COMPLETE CBC W/AUTO DIFF WBC: CPT

## 2020-06-27 PROCEDURE — 770021 HCHG ROOM/CARE - ISO PRIVATE

## 2020-06-27 PROCEDURE — 83880 ASSAY OF NATRIURETIC PEPTIDE: CPT

## 2020-06-27 PROCEDURE — 700111 HCHG RX REV CODE 636 W/ 250 OVERRIDE (IP): Performed by: INTERNAL MEDICINE

## 2020-06-27 PROCEDURE — 83520 IMMUNOASSAY QUANT NOS NONAB: CPT

## 2020-06-27 PROCEDURE — 99232 SBSQ HOSP IP/OBS MODERATE 35: CPT | Mod: CS | Performed by: INTERNAL MEDICINE

## 2020-06-27 PROCEDURE — 83735 ASSAY OF MAGNESIUM: CPT

## 2020-06-27 PROCEDURE — 36415 COLL VENOUS BLD VENIPUNCTURE: CPT

## 2020-06-27 PROCEDURE — 85379 FIBRIN DEGRADATION QUANT: CPT

## 2020-06-27 PROCEDURE — 94760 N-INVAS EAR/PLS OXIMETRY 1: CPT

## 2020-06-27 PROCEDURE — 83615 LACTATE (LD) (LDH) ENZYME: CPT

## 2020-06-27 PROCEDURE — 99233 SBSQ HOSP IP/OBS HIGH 50: CPT | Mod: CS | Performed by: INTERNAL MEDICINE

## 2020-06-27 PROCEDURE — 82550 ASSAY OF CK (CPK): CPT

## 2020-06-27 PROCEDURE — 85610 PROTHROMBIN TIME: CPT

## 2020-06-27 PROCEDURE — A9270 NON-COVERED ITEM OR SERVICE: HCPCS | Performed by: INTERNAL MEDICINE

## 2020-06-27 PROCEDURE — 700102 HCHG RX REV CODE 250 W/ 637 OVERRIDE(OP): Performed by: HOSPITALIST

## 2020-06-27 PROCEDURE — 85384 FIBRINOGEN ACTIVITY: CPT

## 2020-06-27 PROCEDURE — 86140 C-REACTIVE PROTEIN: CPT

## 2020-06-27 PROCEDURE — 82728 ASSAY OF FERRITIN: CPT

## 2020-06-27 PROCEDURE — 700102 HCHG RX REV CODE 250 W/ 637 OVERRIDE(OP): Performed by: INTERNAL MEDICINE

## 2020-06-27 RX ADMIN — ALBUTEROL SULFATE 2 PUFF: 90 AEROSOL, METERED RESPIRATORY (INHALATION) at 07:01

## 2020-06-27 RX ADMIN — LOSARTAN POTASSIUM 50 MG: 50 TABLET, FILM COATED ORAL at 04:25

## 2020-06-27 RX ADMIN — ALBUTEROL SULFATE 2 PUFF: 90 AEROSOL, METERED RESPIRATORY (INHALATION) at 10:10

## 2020-06-27 RX ADMIN — ENOXAPARIN SODIUM 120 MG: 120 INJECTION SUBCUTANEOUS at 17:19

## 2020-06-27 RX ADMIN — ALBUTEROL SULFATE 2 PUFF: 90 AEROSOL, METERED RESPIRATORY (INHALATION) at 15:04

## 2020-06-27 RX ADMIN — AMLODIPINE BESYLATE 10 MG: 5 TABLET ORAL at 04:25

## 2020-06-27 RX ADMIN — ALBUTEROL SULFATE 2 PUFF: 90 AEROSOL, METERED RESPIRATORY (INHALATION) at 19:22

## 2020-06-27 RX ADMIN — METOPROLOL TARTRATE 25 MG: 25 TABLET, FILM COATED ORAL at 17:19

## 2020-06-27 RX ADMIN — DEXAMETHASONE 6 MG: 6 TABLET ORAL at 04:24

## 2020-06-27 RX ADMIN — METOPROLOL TARTRATE 25 MG: 25 TABLET, FILM COATED ORAL at 04:24

## 2020-06-27 RX ADMIN — ENOXAPARIN SODIUM 120 MG: 120 INJECTION SUBCUTANEOUS at 04:25

## 2020-06-27 ASSESSMENT — ENCOUNTER SYMPTOMS
PALPITATIONS: 0
HEADACHES: 0
MYALGIAS: 1
NAUSEA: 0
ABDOMINAL PAIN: 0
FEVER: 0
DIARRHEA: 0
VOMITING: 0
PSYCHIATRIC NEGATIVE: 1
HEMOPTYSIS: 0
NERVOUS/ANXIOUS: 0
EYES NEGATIVE: 1
WHEEZING: 0
COUGH: 0
CARDIOVASCULAR NEGATIVE: 1
DEPRESSION: 0
FOCAL WEAKNESS: 0
GASTROINTESTINAL NEGATIVE: 1
COUGH: 1
WEAKNESS: 1
SPEECH CHANGE: 0
DIAPHORESIS: 0
DIZZINESS: 0
MUSCULOSKELETAL NEGATIVE: 1
SHORTNESS OF BREATH: 1

## 2020-06-27 ASSESSMENT — FIBROSIS 4 INDEX: FIB4 SCORE: 1.42

## 2020-06-27 NOTE — CARE PLAN
Problem: Communication  Goal: The ability to communicate needs accurately and effectively will improve  Outcome: PROGRESSING AS EXPECTED     Problem: Knowledge Deficit  Goal: Knowledge of disease process/condition, treatment plan, diagnostic tests, and medications will improve  Outcome: PROGRESSING AS EXPECTED      Patient calls appropriately and has call light in reach. Patient has been updated on plan of care and will continue to be updated as more information arises. Patient ambulates throughout the day and has been educated on safe ambulation when up

## 2020-06-27 NOTE — CARE PLAN
Patient is Aox3 in room, disoriented to date. VSS, 4L O2 via mask. No complaints of pain or discomfort. Patient was resting comfortably. Patient was educated on plan of care, safety and pain management. Nurse will continue to monitor.   Problem: Communication  Goal: The ability to communicate needs accurately and effectively will improve  Outcome: PROGRESSING AS EXPECTED     Problem: Safety  Goal: Will remain free from injury  Outcome: PROGRESSING AS EXPECTED  Goal: Will remain free from falls  Outcome: PROGRESSING AS EXPECTED     Problem: Infection  Goal: Will remain free from infection  Outcome: PROGRESSING AS EXPECTED     Problem: Venous Thromboembolism (VTW)/Deep Vein Thrombosis (DVT) Prevention:  Goal: Patient will participate in Venous Thrombosis (VTE)/Deep Vein Thrombosis (DVT)Prevention Measures  Outcome: PROGRESSING AS EXPECTED     Problem: Bowel/Gastric:  Goal: Normal bowel function is maintained or improved  Outcome: PROGRESSING AS EXPECTED  Goal: Will not experience complications related to bowel motility  Outcome: PROGRESSING AS EXPECTED     Problem: Knowledge Deficit  Goal: Knowledge of disease process/condition, treatment plan, diagnostic tests, and medications will improve  Outcome: PROGRESSING AS EXPECTED  Goal: Knowledge of the prescribed therapeutic regimen will improve  Outcome: PROGRESSING AS EXPECTED     Problem: Discharge Barriers/Planning  Goal: Patient's continuum of care needs will be met  Outcome: PROGRESSING AS EXPECTED     Problem: Pain Management  Goal: Pain level will decrease to patient's comfort goal  Outcome: PROGRESSING AS EXPECTED     Problem: Respiratory:  Goal: Respiratory status will improve  Outcome: PROGRESSING AS EXPECTED

## 2020-06-27 NOTE — PROGRESS NOTES
Pulmonary Progress Note    Date of admission  6/20/2020    Chief Complaint  49 y.o. male admitted 6/20/2020 with SOB    Hospital Course    49 y.o. male who presented 6/20/2020 with COVID 19  Given convalescent plasma 6/21  Started remdemsivir 6/21  Lovenox 120 mg sc bid  He has been on 4l/NC saturating 92%-94%, minimal awake proning due to patient reluctance.     6/20, 6/23: IL6 <2, 3.6  6/20, 6/23 6/25, 6/27: Ferritin 3152, 3132, 2934,2924  6/20, 6/23 6/25, 6/27: CRP 4.14, 4.27, 3.83,0.81  6/20, 6/23 6/25: D dimer: 0.75, 1.1, 0.53      Interval Problem Update  Reviewed last 24 hour events:  o2 requirement  6 l/min  Subjectively better each day  LFTs elevated  and     Review of Systems  Review of Systems   Constitutional: Positive for malaise/fatigue.   HENT: Negative.    Eyes: Negative.    Respiratory: Positive for shortness of breath.         Improved   Cardiovascular: Negative.    Gastrointestinal: Negative.    Genitourinary: Negative.    Musculoskeletal: Negative.    Skin: Negative.    Neurological: Positive for weakness.   Endo/Heme/Allergies: Negative.    Psychiatric/Behavioral: Negative.         Vital Signs for last 24 hours   Temp:  [36.1 °C (97 °F)-36.6 °C (97.9 °F)] 36.3 °C (97.3 °F)  Pulse:  [73-91] 74  Resp:  [18-20] 20  BP: (116-143)/(64-79) 116/64  SpO2:  [93 %-98 %] 93 %     Physical Exam   Physical Exam  Laying prone  Says breathing better    Medications  Current Facility-Administered Medications   Medication Dose Route Frequency Provider Last Rate Last Dose   • albuterol inhaler 2 Puff  2 Puff Inhalation 4X/DAY (RT) Eryn Hull D.O.   2 Puff at 06/27/20 1010   • albuterol inhaler 2 Puff  2 Puff Inhalation Q4H PRN (RT) Eryn Hull D.O.       • dexamethasone (DECADRON) tablet 6 mg  6 mg Oral DAILY Fanny Beal M.D.   6 mg at 06/27/20 0424   • enoxaparin (LOVENOX) inj 120 mg  1 mg/kg Subcutaneous Q12HRS ANNE HoodO.   120 mg at 06/27/20 0425   • ondansetron (ZOFRAN)  syringe/vial injection 4 mg  4 mg Intravenous Q6HRS PRN Murali hOara M.D.       • ondansetron (ZOFRAN ODT) dispertab 4 mg  4 mg Oral Q6HRS PRMICHAELA Ohara M.D.       • amLODIPine (NORVASC) tablet 10 mg  10 mg Oral Q DAY Martin Mata D.O.   10 mg at 06/27/20 0425   • losartan (COZAAR) tablet 50 mg  50 mg Oral Q DAY Martin Mata D.O.   50 mg at 06/27/20 0425   • metoprolol (LOPRESSOR) tablet 25 mg  25 mg Oral TWICE DAILY Martin Mata D.O.   25 mg at 06/27/20 0424   • acetaminophen (TYLENOL) tablet 650 mg  650 mg Oral Q4HRS PRN Murali Ohara M.D.   650 mg at 06/23/20 1553       Fluids    Intake/Output Summary (Last 24 hours) at 6/27/2020 1238  Last data filed at 6/27/2020 0900  Gross per 24 hour   Intake 1040 ml   Output --   Net 1040 ml       Laboratory      Recent Labs     06/25/20 0405 06/27/20  0702   CPKTOTAL 568* 173*     Recent Labs     06/25/20 0405 06/26/20  0521 06/27/20  0702   SODIUM  --  134*  --    POTASSIUM  --  4.5  --    CHLORIDE  --  104  --    CO2  --  20  --    BUN  --  16  --    CREATININE  --  0.64  --    MAGNESIUM 2.3  --  2.3   CALCIUM  --  8.6  --      Recent Labs     06/25/20 0405 06/26/20  0521 06/27/20  0702   ALTSGPT 48 64* 130*   ASTSGOT 54* 55* 107*   ALKPHOSPHAT 48 48 50   TBILIRUBIN 0.5 0.5 0.6   DBILIRUBIN <0.2  --  0.2   GLUCOSE  --  135*  --      Recent Labs     06/25/20 0405 06/26/20  0521 06/27/20  0702   WBC 2.6* 6.5 10.2   NEUTSPOLYS 65.60 71.70 82.90*   LYMPHOCYTES 28.10 17.00* 9.50*   MONOCYTES 5.10 10.30 6.50   EOSINOPHILS 0.00 0.00 0.00   BASOPHILS 0.00 0.20 0.20   ASTSGOT 54* 55* 107*   ALTSGPT 48 64* 130*   ALKPHOSPHAT 48 48 50   TBILIRUBIN 0.5 0.5 0.6     Recent Labs     06/25/20  0405 06/26/20  0521 06/27/20  0702   RBC 5.01 5.07 5.32   HEMOGLOBIN 13.5* 13.8* 14.2   HEMATOCRIT 42.3 42.4 44.2   PLATELETCT 224 227 298   PROTHROMBTM 14.3  --  14.3   APTT 37.4*  --  38.4*   INR 1.07  --  1.07   FERRITIN 2934.0*  --  2924.0*        Imaging  No new imaging    Assessment/Plan  * Pneumonia due to COVID-19 virus- (present on admission)  Assessment & Plan  Persistent need of oxygen 4-6 liters   subjectively improved and clinically better and markers down  LFTs increasing and may be due to remdesivir will need monitoring  ON dexamethasone day 4/10  Remdisivir day 5/5  On full dose lovenox  S/p 2 doses of plasma   Continue 16 hr awake proning encourage pt, transition to nasal cannula  +7000 since admit would but no output recorded As improving may not need lasix    Reviewed with Dr. Johnston and Dr. Hull  Will sign off as pt improving. Reconsult as needed

## 2020-06-27 NOTE — PROGRESS NOTES
"Infectious Disease Progress Note    Author: Clayton Johnston D.O. Date & Time of service: 6/27/2020  9:21 AM    Chief Complaint:  Follow-up for COVID-19 pneumonia    Interval History:  6/22 T-max 99.5 no CBC done today.  Fever curve slightly improved.  Down to 4 L nasal cannula.  Received convalescent plasma and first dose of remdesivir yesterday and tolerated them.  Feels as if his fevers are improving.  Still struggles with shortness of breath  6/23 afebrile WBC 5.1, oxygen requirements stable.  States his fevers have resolved.  He continues to have shortness of breath with minimal exertion.  His nonproductive cough is also more consistent.  No diarrhea.  6/24 afebrile WBC 4.7.  Remains on 5 L nasal cannula without any changes. Breathing slowly improving and he states he has more energy. Poor appetite  6/25 AFebrile, WBC 2.6 received 2nd unit of plasma yesterday and had some hives on his left arm which have now resolved. On 4 liters today.  Patient is prone and states when he moves into a sitting position he has some trouble breathing.  He also states that food taste salty to him, even his ice cream.  CTA chest canceled  6/26 Afebrile. On 6L NC, HR in 70-80s, SBP in 120-130s. WBC 6.5, plt 227K. Creatinine 0.64. ALT/AST stable in 50s/60s  ,   6/27 Afebrile with Tm 36.3C, 6L oxymask with sat >92%, hemodynamically stable. Feeling his appetite is back. Loss of taste is also coming back. Proned \"most of day\" yesterday    Labs Reviewed and Medications Reviewed.    Review of Systems:  Review of Systems   Constitutional: Positive for malaise/fatigue. Negative for diaphoresis and fever.   Respiratory: Positive for cough and shortness of breath. Negative for hemoptysis.         Slowly improving   Cardiovascular: Negative for chest pain.   Gastrointestinal: Negative for abdominal pain, diarrhea, nausea and vomiting.   Musculoskeletal: Positive for myalgias.   Neurological: Negative for dizziness and headaches. "       Hemodynamics:  Temp (24hrs), Av.3 °C (97.4 °F), Min:36.1 °C (97 °F), Max:36.6 °C (97.9 °F)  Temperature: 36.6 °C (97.9 °F)  Pulse  Av.4  Min: 71  Max: 107   Blood Pressure: 131/79       Physical Exam:  Physical Exam  Constitutional:       Appearance: He is obese. He is ill-appearing.   HENT:      Mouth/Throat:      Mouth: Mucous membranes are moist.      Pharynx: No oropharyngeal exudate.   Eyes:      Extraocular Movements: Extraocular movements intact.      Conjunctiva/sclera: Conjunctivae normal.      Pupils: Pupils are equal, round, and reactive to light.   Neck:      Musculoskeletal: Normal range of motion and neck supple.   Cardiovascular:      Rate and Rhythm: Regular rhythm. Tachycardia present.   Pulmonary:      Effort: Pulmonary effort is normal.   Abdominal:      Palpations: Abdomen is soft.      Tenderness: There is no abdominal tenderness.   Musculoskeletal: Normal range of motion.   Skin:     General: Skin is warm and dry.   Neurological:      General: No focal deficit present.      Mental Status: He is alert and oriented to person, place, and time.      Cranial Nerves: No cranial nerve deficit.   Psychiatric:         Mood and Affect: Mood normal.         Behavior: Behavior normal.         Meds:    Current Facility-Administered Medications:   •  albuterol  •  albuterol  •  dexamethasone  •  enoxaparin (LOVENOX) injection  •  ondansetron  •  ondansetron  •  amLODIPine  •  losartan  •  metoprolol  •  acetaminophen    Labs:  Recent Labs     20  0405 20  0521 20  0702   WBC 2.6* 6.5 10.2   RBC 5.01 5.07 5.32   HEMOGLOBIN 13.5* 13.8* 14.2   HEMATOCRIT 42.3 42.4 44.2   MCV 84.4 83.6 83.1   MCH 26.9* 27.2 26.7*   RDW 42.2 41.9 40.5   PLATELETCT 224 227 298   MPV 9.5 10.4 9.4   NEUTSPOLYS 65.60 71.70 82.90*   LYMPHOCYTES 28.10 17.00* 9.50*   MONOCYTES 5.10 10.30 6.50   EOSINOPHILS 0.00 0.00 0.00   BASOPHILS 0.00 0.20 0.20     Recent Labs     20  0405 20  0521  "06/27/20  0702   SODIUM  --  134*  --    POTASSIUM  --  4.5  --    CHLORIDE  --  104  --    CO2  --  20  --    GLUCOSE  --  135*  --    BUN  --  16  --    CPKTOTAL 568*  --  173*     Recent Labs     06/25/20  0405 06/26/20  0521 06/27/20  0702   ALBUMIN 3.3 3.0* 3.2   TBILIRUBIN 0.5 0.5 0.6   ALKPHOSPHAT 48 48 50   TOTPROTEIN 6.6 6.4 6.3   ALTSGPT 48 64* 130*   ASTSGOT 54* 55* 107*   CREATININE  --  0.64  --        Imaging:  Dx-chest-portable (1 View)    Result Date: 6/20/2020 6/20/2020 11:28 PM HISTORY/REASON FOR EXAM:  Shortness of Breath Fever. Suspect possible Covid-19 exposure rule out TECHNIQUE/EXAM DESCRIPTION AND NUMBER OF VIEWS: Single portable view of the chest. COMPARISON: None FINDINGS: Patchy bilateral lower lung opacities No pleural effusion. No pneumothorax. Normal cardiopericardial silhouette.     Patchy bilateral lower lung opacities, atelectasis or multifocal pneumonia.      Micro:  Results     Procedure Component Value Units Date/Time    Blood Culture [[id number]] Collected:  06/20/20 2321    Order Status:  Completed Specimen:  Blood from Peripheral Updated:  06/26/20 0100     Significant Indicator NEG     Source BLD     Site PERIPHERAL     Culture Result No growth after 5 days of incubation.    Narrative:       Droplet, Contact, and Eye Protection  2 of 2 blood culture x2  Sites order. Per Hospital Policy:  Only change Specimen Src: to \"Line\" if specified by physician  order.  No site indicated    Blood Culture [[id number]] Collected:  06/20/20 2321    Order Status:  Completed Specimen:  Blood from Peripheral Updated:  06/26/20 0100     Significant Indicator NEG     Source BLD     Site PERIPHERAL     Culture Result No growth after 5 days of incubation.    Narrative:       Droplet, Contact, and Eye Protection  1 of 2 for Blood Culture x 2 sites order. Per Hospital  Policy: Only change Specimen Src: to \"Line\" if specified by  physician order.  No site indicated    SARS-CoV-2, PCR (In-House) " [[id number]]  (Abnormal) Collected:  06/20/20 2325    Order Status:  Completed Updated:  06/21/20 0041     SARS-CoV-2 Source NP Swab     SARS-CoV-2 by PCR DETECTED     Comment: **The [facility] GeneXpert Xpress SARS-CoV-2 Test has been made available for  use under the Emergency Use Authorization (EUA) only.         Narrative:       Droplet, Contact, and Eye Protection  Rule-out COVID-19 panel, includes droplet/contact/eye  isolation order.  Check influenza to order this test only if  specifically indicated.  Expected Turn around time?->Rush (Cepheid 2-4 hours)    COVID/SARS CoV-2 PCR [[id number]] Collected:  06/20/20 2325    Order Status:  Completed Specimen:  Respirate from Nasopharyngeal Updated:  06/20/20 2333     COVID Order Status Received    Narrative:       Droplet, Contact, and Eye Protection  Rule-out COVID-19 panel, includes droplet/contact/eye  isolation order.  Check influenza to order this test only if  specifically indicated.  Expected Turn around time?->Rush (Cepheid 2-4 hours)        Component      Latest Ref Rng & Units 6/20/2020 6/20/2020 6/20/2020 6/20/2020          11:21 PM 11:21 PM 11:21 PM 11:21 PM   Ferritin      22.0 - 322.0 ng/mL       Troponin T      6 - 19 ng/L  14     CPK Total      0 - 154 U/L   556 (H)    D-Dimer Screen      0.00 - 0.50 ug/mL (FEU) 0.75 (H)      Stat C-Reactive Protein      0.00 - 0.75 mg/dL    4.14 (H)   Sed Rate Westergren      0 - 15 mm/hour       LDH Total      107 - 266 U/L         Component      Latest Ref Rng & Units 6/20/2020 6/20/2020 6/20/2020 6/23/2020          11:21 PM 11:21 PM 11:21 PM  3:21 AM   Ferritin      22.0 - 322.0 ng/mL  3152.0 (H)     Troponin T      6 - 19 ng/L       CPK Total      0 - 154 U/L       D-Dimer Screen      0.00 - 0.50 ug/mL (FEU)    1.10 (H)   Stat C-Reactive Protein      0.00 - 0.75 mg/dL       Sed Rate Westergren      0 - 15 mm/hour   20 (H)    LDH Total      107 - 266 U/L 370 (H)        Component      Latest Ref Rng & Units 6/23/2020  6/23/2020 6/23/2020 6/23/2020           3:21 AM  3:21 AM  3:21 AM  3:21 AM   Ferritin      22.0 - 322.0 ng/mL       Troponin T      6 - 19 ng/L    12   CPK Total      0 - 154 U/L 823 (H)      D-Dimer Screen      0.00 - 0.50 ug/mL (FEU)       Stat C-Reactive Protein      0.00 - 0.75 mg/dL  4.27 (H)     Sed Rate Westergren      0 - 15 mm/hour       LDH Total      107 - 266 U/L   401 (H)      Component      Latest Ref Rng & Units 6/23/2020 6/25/2020 6/25/2020 6/25/2020           3:21 AM  4:05 AM  4:05 AM  4:05 AM   Ferritin      22.0 - 322.0 ng/mL 3132.0 (H)      Troponin T      6 - 19 ng/L       CPK Total      0 - 154 U/L   568 (H)    D-Dimer Screen      0.00 - 0.50 ug/mL (FEU)  0.53 (H)     Stat C-Reactive Protein      0.00 - 0.75 mg/dL    3.83 (H)   Sed Rate Westergren      0 - 15 mm/hour       LDH Total      107 - 266 U/L         Component      Latest Ref Rng & Units 6/25/2020 6/25/2020 6/25/2020           4:05 AM  4:05 AM  4:05 AM   Ferritin      22.0 - 322.0 ng/mL   2934.0 (H)   Troponin T      6 - 19 ng/L  10    CPK Total      0 - 154 U/L      D-Dimer Screen      0.00 - 0.50 ug/mL (FEU)      Stat C-Reactive Protein      0.00 - 0.75 mg/dL      Sed Rate Westergren      0 - 15 mm/hour      LDH Total      107 - 266 U/L 463 (H)       Assessment:  Active Hospital Problems    Diagnosis   • *Pneumonia due to COVID-19 virus [U07.1, J12.89]   • Acute hypoxemic respiratory failure (HCC) [J96.01]   • Hyponatremia [E87.1]   • Abnormal LFTs [R94.5]   • Reactive airway disease with acute exacerbation [J45.901]   • HTN (hypertension), benign [I10]   • BMI 40.0-44.9, adult (HCC) [Z68.41]       Plan:  Acute hypoxic respiratory failure  COVID-19 pneumonia  Fevers resolved  Leukopenic  Overall improving clinically.     -Continue supportive care.  Self proning, oxygen supplementation, judicious use of IV fluids  - 16 hour awake proning  -Continue IV remdesivir 200mg x1, followed by 100 mg daily for a 5-day course. 6/21 -6/26/2020    -Monitor LFTs. AST//130 today. Will monitor at this time.   -Convalescent plasma given on 6/21/2020 and 6/24/2020  -Follow blood cultures from 6/20, no growth till date  -Agree with 5-day course of empiric antibiotics given elevated procalcitonin. Stop date 06/25/2020  -Continue therapeutic anticoagulation given elevated d-dimer and increased risk for thromboembolic disease in COVID-19  -On dexamethasone 6mg daily for total 10 days     -Inflammatory markers:  CRP: 4.14--> 4.27-->3.83 --> 0.81  Ferritin:3152--> 3132-->2934 --> 2924  LDH: 370--> 401-->463 --> 426  D-dimer: 0.75--> 1.10-->0.53 --> 0.29  Procalcitonin: 0.41  IL-6: < 2.0 --> pending    Will recheck above labs and monitor every 48 hours     I have performed a physical exam and reviewed and updated ROS and plan today 6/27/2020.  In review of yesterday's note 6/26/2020, there are no changes except as documented above.    I called his wife, Alber Neves today 6/27 and updated his condition. All questions are answered.      Discussed with  and Della

## 2020-06-27 NOTE — PROGRESS NOTES
Hospital Medicine Daily Progress Note    Date of Service  6/27/2020    Chief Complaint  49 y.o. male admitted 6/20/2020 with fever and SOB    Hospital Course    PMHx HTN.  Presenting with cough and SOB.  outpt COVID neg on 6/15.  He does however have known + exposure to family members.  Sx's developed and worsened over 5 days. In ED temp 102 and 87% on RA.   CXR showing B patchy infiltrates.  Admitted on C3/Zithro for community acquired pneumonia and acute hypoxic resp failure      Interval Problem Update    Improving shortness of breath, continue  Improved appetite  Slightly improving activity tolerance, improving respiratory status  Still requiring 6 L oxygen supplementation    Patient reports that his sister is having some difficulty with treatment, and mother, they are considering starting plasma  They are hospitalized in [place]    Consultants/Specialty  ID  Pulmonary    Code Status  full    Disposition    TBD  Discharge to home in 3 to 4 days with clinical improvement, continue to monitor  Will likely need oxygen on discharge  Monitor LFTs  Discussed with pulmonary    Review of Systems  Review of Systems   Constitutional: Negative for malaise/fatigue.   Respiratory: Positive for shortness of breath. Negative for cough and wheezing.    Cardiovascular: Negative for chest pain, palpitations and leg swelling.   Gastrointestinal: Negative for abdominal pain and nausea.   Genitourinary: Negative for urgency.   Neurological: Positive for weakness. Negative for dizziness, speech change, focal weakness and headaches.   Psychiatric/Behavioral: Negative for depression. The patient is not nervous/anxious.         Physical Exam  Temp:  [36.1 °C (97 °F)-36.6 °C (97.9 °F)] 36.3 °C (97.3 °F)  Pulse:  [73-91] 73  Resp:  [18-20] 20  BP: (116-143)/(64-79) 116/64  SpO2:  [93 %-98 %] 94 %    Physical Exam  Vitals signs reviewed.   Constitutional:       General: He is not in acute distress.     Appearance: He is well-developed. He  is ill-appearing.   HENT:      Head: Normocephalic and atraumatic.   Eyes:      Conjunctiva/sclera: Conjunctivae normal.   Neck:      Vascular: No JVD.   Cardiovascular:      Rate and Rhythm: Normal rate.   Pulmonary:      Effort: Respiratory distress present.      Breath sounds: No wheezing.      Comments: Improving breath sounds, no expiratory wheezes noted    Abdominal:      General: There is no distension.      Palpations: Abdomen is soft.      Tenderness: There is no abdominal tenderness.   Musculoskeletal:         General: No swelling or tenderness.   Skin:     General: Skin is warm.      Coloration: Skin is not jaundiced.   Neurological:      General: No focal deficit present.      Mental Status: He is oriented to person, place, and time. Mental status is at baseline.      Cranial Nerves: No cranial nerve deficit.      Sensory: No sensory deficit.      Motor: Weakness present.   Psychiatric:         Behavior: Behavior normal.         Thought Content: Thought content normal.         Fluids    Intake/Output Summary (Last 24 hours) at 6/27/2020 1224  Last data filed at 6/27/2020 0900  Gross per 24 hour   Intake 1040 ml   Output --   Net 1040 ml       Laboratory  Recent Labs     06/25/20  0405 06/26/20  0521 06/27/20  0702   WBC 2.6* 6.5 10.2   RBC 5.01 5.07 5.32   HEMOGLOBIN 13.5* 13.8* 14.2   HEMATOCRIT 42.3 42.4 44.2   MCV 84.4 83.6 83.1   MCH 26.9* 27.2 26.7*   MCHC 31.9* 32.5* 32.1*   RDW 42.2 41.9 40.5   PLATELETCT 224 227 298   MPV 9.5 10.4 9.4     Recent Labs     06/26/20  0521   SODIUM 134*   POTASSIUM 4.5   CHLORIDE 104   CO2 20   GLUCOSE 135*   BUN 16   CREATININE 0.64   CALCIUM 8.6     Recent Labs     06/25/20  0405 06/27/20  0702   APTT 37.4* 38.4*   INR 1.07 1.07               Imaging  DX-CHEST-PORTABLE (1 VIEW)   Final Result         Patchy bilateral lower lung opacities, atelectasis or multifocal pneumonia.           Assessment/Plan  * Pneumonia due to COVID-19 virus- (present on  admission)  Assessment & Plan  DC tele  Wean 02 as tolerated  Cont with empiric IV abx   Follow up covid 19 labs ordered   Blood cx neg  Remdesivir D2  S/p convalescent plasma 6/21  ID following   Clinically is improved    6/23 DDimer>1: increaseing crp, change to full dose anticoagulation    6/24 increasing sob with tachypnea, on 4L face mask sat 90%  ABG 7.41/ PO2 20  pulm consulted, Dr. Bautista, appreciate input  - CT PE ordered    6/25 sob improving, now neutropenic  Improving CRP and Ddimer  -LDH elevated, monitor  On decadron per pulm x 10 days or dc  - repeat convalescent serum, ID to assist  - on Remdesivir    Per pulm, ABG, PO2 reviewed    6/26 monitor CRP, d-dimer and LDH to 48 hours  Respiratory symptoms improving, intermittently requiring 4 to 6 L oxygen supplementation  Clinically improving  Monitor closely  Encourage I-S use  Status post plasma x2, completed Remdesivir on June 25 6/27 decreasing CRP, d-dimer, CPK and LDH  Still requiring 6 L oxygen supplementation, continue prone positioning      Reactive airway disease with acute exacerbation  Assessment & Plan  Pt states he only has sx's with exercise or with his allergies.  Start scheduled BD therapy  Holding on steroids but will have a low thershold to initiate if any worsening of his COVID disease or worsening of his resp exam    Abnormal LFTs  Assessment & Plan  Mild, likely due to covid   Cont to trend    6/27 increasing LFT, monitor closely    Hyponatremia  Assessment & Plan  Mild, cont to trend    Acute hypoxemic respiratory failure (HCC)  Assessment & Plan  Wean 02 as tolerated  COVID +, Hx reactive airway disease, possible bacterial superinfection    BMI 40.0-44.9, adult (HCC)- (present on admission)  Assessment & Plan  Encourage weight loss    HTN (hypertension), benign- (present on admission)  Assessment & Plan  Resume home CCB, BB and ARB with parameters       VTE prophylaxis: enoxaparin

## 2020-06-28 LAB
ALBUMIN SERPL BCP-MCNC: 3.2 G/DL (ref 3.2–4.9)
ALBUMIN/GLOB SERPL: 1.1 G/DL
ALP SERPL-CCNC: 52 U/L (ref 30–99)
ALT SERPL-CCNC: 212 U/L (ref 2–50)
ANION GAP SERPL CALC-SCNC: 8 MMOL/L (ref 7–16)
AST SERPL-CCNC: 116 U/L (ref 12–45)
BASOPHILS # BLD AUTO: 0.2 % (ref 0–1.8)
BASOPHILS # BLD: 0.02 K/UL (ref 0–0.12)
BILIRUB SERPL-MCNC: 0.5 MG/DL (ref 0.1–1.5)
BUN SERPL-MCNC: 18 MG/DL (ref 8–22)
CALCIUM SERPL-MCNC: 8.6 MG/DL (ref 8.5–10.5)
CHLORIDE SERPL-SCNC: 103 MMOL/L (ref 96–112)
CO2 SERPL-SCNC: 26 MMOL/L (ref 20–33)
CREAT SERPL-MCNC: 0.82 MG/DL (ref 0.5–1.4)
EOSINOPHIL # BLD AUTO: 0 K/UL (ref 0–0.51)
EOSINOPHIL NFR BLD: 0 % (ref 0–6.9)
ERYTHROCYTE [DISTWIDTH] IN BLOOD BY AUTOMATED COUNT: 41.3 FL (ref 35.9–50)
GLOBULIN SER CALC-MCNC: 3 G/DL (ref 1.9–3.5)
GLUCOSE SERPL-MCNC: 115 MG/DL (ref 65–99)
HCT VFR BLD AUTO: 46 % (ref 42–52)
HGB BLD-MCNC: 14.8 G/DL (ref 14–18)
IMM GRANULOCYTES # BLD AUTO: 0.12 K/UL (ref 0–0.11)
IMM GRANULOCYTES NFR BLD AUTO: 1.1 % (ref 0–0.9)
LYMPHOCYTES # BLD AUTO: 1.39 K/UL (ref 1–4.8)
LYMPHOCYTES NFR BLD: 12.9 % (ref 22–41)
MCH RBC QN AUTO: 27.2 PG (ref 27–33)
MCHC RBC AUTO-ENTMCNC: 32.2 G/DL (ref 33.7–35.3)
MCV RBC AUTO: 84.6 FL (ref 81.4–97.8)
MONOCYTES # BLD AUTO: 0.71 K/UL (ref 0–0.85)
MONOCYTES NFR BLD AUTO: 6.6 % (ref 0–13.4)
NEUTROPHILS # BLD AUTO: 8.52 K/UL (ref 1.82–7.42)
NEUTROPHILS NFR BLD: 79.2 % (ref 44–72)
NRBC # BLD AUTO: 0 K/UL
NRBC BLD-RTO: 0 /100 WBC
PLATELET # BLD AUTO: 294 K/UL (ref 164–446)
PMV BLD AUTO: 8.9 FL (ref 9–12.9)
POTASSIUM SERPL-SCNC: 4.6 MMOL/L (ref 3.6–5.5)
PROT SERPL-MCNC: 6.2 G/DL (ref 6–8.2)
RBC # BLD AUTO: 5.44 M/UL (ref 4.7–6.1)
SODIUM SERPL-SCNC: 137 MMOL/L (ref 135–145)
WBC # BLD AUTO: 10.8 K/UL (ref 4.8–10.8)

## 2020-06-28 PROCEDURE — 700102 HCHG RX REV CODE 250 W/ 637 OVERRIDE(OP): Performed by: INTERNAL MEDICINE

## 2020-06-28 PROCEDURE — 99232 SBSQ HOSP IP/OBS MODERATE 35: CPT | Performed by: INTERNAL MEDICINE

## 2020-06-28 PROCEDURE — 700111 HCHG RX REV CODE 636 W/ 250 OVERRIDE (IP): Performed by: INTERNAL MEDICINE

## 2020-06-28 PROCEDURE — 99232 SBSQ HOSP IP/OBS MODERATE 35: CPT | Mod: CS | Performed by: INTERNAL MEDICINE

## 2020-06-28 PROCEDURE — 80053 COMPREHEN METABOLIC PANEL: CPT

## 2020-06-28 PROCEDURE — A9270 NON-COVERED ITEM OR SERVICE: HCPCS | Performed by: HOSPITALIST

## 2020-06-28 PROCEDURE — 94760 N-INVAS EAR/PLS OXIMETRY 1: CPT

## 2020-06-28 PROCEDURE — 85025 COMPLETE CBC W/AUTO DIFF WBC: CPT

## 2020-06-28 PROCEDURE — 770021 HCHG ROOM/CARE - ISO PRIVATE

## 2020-06-28 PROCEDURE — 700102 HCHG RX REV CODE 250 W/ 637 OVERRIDE(OP): Performed by: HOSPITALIST

## 2020-06-28 PROCEDURE — 36415 COLL VENOUS BLD VENIPUNCTURE: CPT

## 2020-06-28 PROCEDURE — A9270 NON-COVERED ITEM OR SERVICE: HCPCS | Performed by: INTERNAL MEDICINE

## 2020-06-28 RX ADMIN — LOSARTAN POTASSIUM 50 MG: 50 TABLET, FILM COATED ORAL at 04:51

## 2020-06-28 RX ADMIN — ENOXAPARIN SODIUM 120 MG: 120 INJECTION SUBCUTANEOUS at 17:52

## 2020-06-28 RX ADMIN — ENOXAPARIN SODIUM 120 MG: 120 INJECTION SUBCUTANEOUS at 04:53

## 2020-06-28 RX ADMIN — METOPROLOL TARTRATE 25 MG: 25 TABLET, FILM COATED ORAL at 04:51

## 2020-06-28 RX ADMIN — AMLODIPINE BESYLATE 10 MG: 5 TABLET ORAL at 04:51

## 2020-06-28 RX ADMIN — ALBUTEROL SULFATE 2 PUFF: 90 AEROSOL, METERED RESPIRATORY (INHALATION) at 11:10

## 2020-06-28 RX ADMIN — ALBUTEROL SULFATE 2 PUFF: 90 AEROSOL, METERED RESPIRATORY (INHALATION) at 08:26

## 2020-06-28 RX ADMIN — DEXAMETHASONE 6 MG: 6 TABLET ORAL at 04:51

## 2020-06-28 RX ADMIN — METOPROLOL TARTRATE 25 MG: 25 TABLET, FILM COATED ORAL at 17:52

## 2020-06-28 RX ADMIN — ALBUTEROL SULFATE 2 PUFF: 90 AEROSOL, METERED RESPIRATORY (INHALATION) at 14:41

## 2020-06-28 ASSESSMENT — ENCOUNTER SYMPTOMS
VOMITING: 0
HEADACHES: 0
COUGH: 0
MYALGIAS: 0
BACK PAIN: 0
CLAUDICATION: 0
FEVER: 0
MEMORY LOSS: 0
ABDOMINAL PAIN: 0
DIZZINESS: 0
WHEEZING: 0
WEAKNESS: 1
COUGH: 1
DIARRHEA: 0
CHILLS: 0
NAUSEA: 0
SHORTNESS OF BREATH: 1
NERVOUS/ANXIOUS: 0
DIAPHORESIS: 0
HEARTBURN: 0
HEMOPTYSIS: 0
DEPRESSION: 0

## 2020-06-28 NOTE — CARE PLAN
No change in patient condition, VSS, patient is on 4L via NC. Nurse will continue to monitor   Problem: Communication  Goal: The ability to communicate needs accurately and effectively will improve  Outcome: PROGRESSING AS EXPECTED     Problem: Safety  Goal: Will remain free from injury  Outcome: PROGRESSING AS EXPECTED  Goal: Will remain free from falls  Outcome: PROGRESSING AS EXPECTED     Problem: Infection  Goal: Will remain free from infection  Outcome: PROGRESSING AS EXPECTED     Problem: Venous Thromboembolism (VTW)/Deep Vein Thrombosis (DVT) Prevention:  Goal: Patient will participate in Venous Thrombosis (VTE)/Deep Vein Thrombosis (DVT)Prevention Measures  Outcome: PROGRESSING AS EXPECTED     Problem: Bowel/Gastric:  Goal: Normal bowel function is maintained or improved  Outcome: PROGRESSING AS EXPECTED  Goal: Will not experience complications related to bowel motility  Outcome: PROGRESSING AS EXPECTED     Problem: Knowledge Deficit  Goal: Knowledge of disease process/condition, treatment plan, diagnostic tests, and medications will improve  Outcome: PROGRESSING AS EXPECTED  Goal: Knowledge of the prescribed therapeutic regimen will improve  Outcome: PROGRESSING AS EXPECTED     Problem: Discharge Barriers/Planning  Goal: Patient's continuum of care needs will be met  Outcome: PROGRESSING AS EXPECTED     Problem: Pain Management  Goal: Pain level will decrease to patient's comfort goal  Outcome: PROGRESSING AS EXPECTED     Problem: Respiratory:  Goal: Respiratory status will improve  Outcome: PROGRESSING AS EXPECTED

## 2020-06-28 NOTE — PROGRESS NOTES
"Assessment completed.  Pt A&Ox4.  Pt denies any SOB, reports \"feeling better.\" Pt denies any pain at this time.  POC discussed.  Needs met, will continue to monitor.    "

## 2020-06-28 NOTE — PROGRESS NOTES
"Infectious Disease Progress Note    Author: Clayton Johnston D.O. Date & Time of service: 6/28/2020  7:57 AM    Chief Complaint:  Follow-up for COVID-19 pneumonia    Interval History:  6/22 T-max 99.5 no CBC done today.  Fever curve slightly improved.  Down to 4 L nasal cannula.  Received convalescent plasma and first dose of remdesivir yesterday and tolerated them.  Feels as if his fevers are improving.  Still struggles with shortness of breath  6/23 afebrile WBC 5.1, oxygen requirements stable.  States his fevers have resolved.  He continues to have shortness of breath with minimal exertion.  His nonproductive cough is also more consistent.  No diarrhea.  6/24 afebrile WBC 4.7.  Remains on 5 L nasal cannula without any changes. Breathing slowly improving and he states he has more energy. Poor appetite  6/25 AFebrile, WBC 2.6 received 2nd unit of plasma yesterday and had some hives on his left arm which have now resolved. On 4 liters today.  Patient is prone and states when he moves into a sitting position he has some trouble breathing.  He also states that food taste salty to him, even his ice cream.  CTA chest canceled  6/26 Afebrile. On 6L NC, HR in 70-80s, SBP in 120-130s. WBC 6.5, plt 227K. Creatinine 0.64. ALT/AST stable in 50s/60s  ,      6/27 Afebrile with Tm 36.3C, 6L oxymask with sat >92%, hemodynamically stable. Feeling his appetite is back. Change of salty taste is also coming back. Proned \"most of day\" yesterday. AST//130    6/28 Afebrile with Tm 37.2. On 4L NC, self proning. HD stable. AST/ALT is slowly going up today 116/212. Pt off RDV since 6/25. Pt continues to feel better, breathing better. Afebrile. The salty taste has resolved.      Labs Reviewed and Medications Reviewed.    Review of Systems:  Review of Systems   Constitutional: Negative for diaphoresis, fever and malaise/fatigue.   Respiratory: Positive for cough and shortness of breath. Negative for hemoptysis.         " Continues to improve   Cardiovascular: Negative for chest pain.   Gastrointestinal: Negative for abdominal pain, diarrhea, nausea and vomiting.   Musculoskeletal: Negative for myalgias.   Neurological: Negative for dizziness and headaches.       Hemodynamics:  Temp (24hrs), Av.7 °C (98 °F), Min:36.3 °C (97.3 °F), Max:37.2 °C (99 °F)  Temperature: 37.2 °C (99 °F)  Pulse  Av  Min: 71  Max: 107   Blood Pressure: 120/82       Physical Exam:  Physical Exam  Constitutional:       Appearance: Normal appearance. He is not ill-appearing.   Neck:      Musculoskeletal: Normal range of motion.   Cardiovascular:      Rate and Rhythm: Regular rhythm. Tachycardia present.   Pulmonary:      Effort: Pulmonary effort is normal. No respiratory distress.      Breath sounds: Normal breath sounds.      Comments: Diminished throughout, no crackles  Abdominal:      General: Abdomen is flat.      Palpations: Abdomen is soft. There is no mass.      Tenderness: There is no abdominal tenderness.   Musculoskeletal:      Right lower leg: No edema.      Left lower leg: No edema.   Skin:     General: Skin is warm.      Capillary Refill: Capillary refill takes less than 2 seconds.   Neurological:      General: No focal deficit present.      Mental Status: He is alert and oriented to person, place, and time.   Psychiatric:         Mood and Affect: Mood normal.         Meds:    Current Facility-Administered Medications:   •  albuterol  •  albuterol  •  dexamethasone  •  enoxaparin (LOVENOX) injection  •  ondansetron  •  ondansetron  •  amLODIPine  •  losartan  •  metoprolol  •  acetaminophen    Labs:  Recent Labs     20  0521 20  0702 20  0415   WBC 6.5 10.2 10.8   RBC 5.07 5.32 5.44   HEMOGLOBIN 13.8* 14.2 14.8   HEMATOCRIT 42.4 44.2 46.0   MCV 83.6 83.1 84.6   MCH 27.2 26.7* 27.2   RDW 41.9 40.5 41.3   PLATELETCT 227 298 294   MPV 10.4 9.4 8.9*   NEUTSPOLYS 71.70 82.90* 79.20*   LYMPHOCYTES 17.00* 9.50* 12.90*   MONOCYTES  "10.30 6.50 6.60   EOSINOPHILS 0.00 0.00 0.00   BASOPHILS 0.20 0.20 0.20     Recent Labs     06/26/20  0521 06/27/20  0702 06/28/20  0415   SODIUM 134*  --  137   POTASSIUM 4.5  --  4.6   CHLORIDE 104  --  103   CO2 20  --  26   GLUCOSE 135*  --  115*   BUN 16  --  18   CPKTOTAL  --  173*  --      Recent Labs     06/26/20  0521 06/27/20  0702 06/28/20  0415   ALBUMIN 3.0* 3.2 3.2   TBILIRUBIN 0.5 0.6 0.5   ALKPHOSPHAT 48 50 52   TOTPROTEIN 6.4 6.3 6.2   ALTSGPT 64* 130* 212*   ASTSGOT 55* 107* 116*   CREATININE 0.64  --  0.82       Imaging:  Dx-chest-portable (1 View)    Result Date: 6/20/2020 6/20/2020 11:28 PM HISTORY/REASON FOR EXAM:  Shortness of Breath Fever. Suspect possible Covid-19 exposure rule out TECHNIQUE/EXAM DESCRIPTION AND NUMBER OF VIEWS: Single portable view of the chest. COMPARISON: None FINDINGS: Patchy bilateral lower lung opacities No pleural effusion. No pneumothorax. Normal cardiopericardial silhouette.     Patchy bilateral lower lung opacities, atelectasis or multifocal pneumonia.      Micro:  Results     Procedure Component Value Units Date/Time    Blood Culture [[id number]] Collected:  06/20/20 2321    Order Status:  Completed Specimen:  Blood from Peripheral Updated:  06/26/20 0100     Significant Indicator NEG     Source BLD     Site PERIPHERAL     Culture Result No growth after 5 days of incubation.    Narrative:       Droplet, Contact, and Eye Protection  2 of 2 blood culture x2  Sites order. Per Hospital Policy:  Only change Specimen Src: to \"Line\" if specified by physician  order.  No site indicated    Blood Culture [[id number]] Collected:  06/20/20 2321    Order Status:  Completed Specimen:  Blood from Peripheral Updated:  06/26/20 0100     Significant Indicator NEG     Source BLD     Site PERIPHERAL     Culture Result No growth after 5 days of incubation.    Narrative:       Droplet, Contact, and Eye Protection  1 of 2 for Blood Culture x 2 sites order. Per Hospital  Policy: Only " "change Specimen Src: to \"Line\" if specified by  physician order.  No site indicated      Assessment:  Active Hospital Problems    Diagnosis   • *Pneumonia due to COVID-19 virus [U07.1, J12.89]   • Acute hypoxemic respiratory failure (HCC) [J96.01]   • Hyponatremia [E87.1]   • Abnormal LFTs [R94.5]   • Reactive airway disease with acute exacerbation [J45.901]   • HTN (hypertension), benign [I10]   • BMI 40.0-44.9, adult (HCC) [Z68.41]       Plan:  Acute hypoxic respiratory failure  COVID-19 pneumonia  Fevers resolved  Leukopenic - improving  Overall improving clinically.     -Continue supportive care.  Self proning, oxygen supplementation, judicious use of IV fluids  - 16 hour awake proning  -Completed 5 day course 6/21-6/25/2020 of remdesivir.    -Monitor LFTs. AST/ALT slowly increasing to 116/212 today. Pt completed RDV on 6/25. Will monitor at this time.   -Convalescent plasma given on 6/21/2020 and 6/24/2020  - Avoid hepatotoxic medications.   -Blood cultures from 6/20, no growth till date  -Continue therapeutic anticoagulation given elevated d-dimer and increased risk for thromboembolic disease in COVID-19  -On dexamethasone 6mg daily for total 10 days     -Inflammatory markers:  CRP: 4.14--> 4.27-->3.83 --> 0.81  Ferritin:3152--> 3132-->2934 --> 2924  LDH: 370--> 401-->463 --> 426  D-dimer: 0.75--> 1.10-->0.53 --> 0.29  Procalcitonin: 0.41  IL-6: < 2.0 --> pending    Will recheck above labs and monitor every 48 hours     I have performed a physical exam and reviewed and updated ROS and plan today 6/28/2020.  In review of yesterday's note 6/27/2020, there are no changes except as documented above.    I informed his wife, Alber Neves today 6/28 and updated his condition. All questions are answered.      Discussed with    "

## 2020-06-29 LAB
ALBUMIN SERPL BCP-MCNC: 3.6 G/DL (ref 3.2–4.9)
ALP SERPL-CCNC: 54 U/L (ref 30–99)
ALT SERPL-CCNC: 220 U/L (ref 2–50)
APTT PPP: 33.1 SEC (ref 24.7–36)
AST SERPL-CCNC: 78 U/L (ref 12–45)
BASOPHILS # BLD AUTO: 0.3 % (ref 0–1.8)
BASOPHILS # BLD: 0.04 K/UL (ref 0–0.12)
BILIRUB CONJ SERPL-MCNC: <0.2 MG/DL (ref 0.1–0.5)
BILIRUB INDIRECT SERPL-MCNC: ABNORMAL MG/DL (ref 0–1)
BILIRUB SERPL-MCNC: 0.6 MG/DL (ref 0.1–1.5)
CK SERPL-CCNC: 170 U/L (ref 0–154)
CRP SERPL HS-MCNC: 0.35 MG/DL (ref 0–0.75)
D DIMER PPP IA.FEU-MCNC: 0.28 UG/ML (FEU) (ref 0–0.5)
EOSINOPHIL # BLD AUTO: 0 K/UL (ref 0–0.51)
EOSINOPHIL NFR BLD: 0 % (ref 0–6.9)
ERYTHROCYTE [DISTWIDTH] IN BLOOD BY AUTOMATED COUNT: 39.2 FL (ref 35.9–50)
FERRITIN SERPL-MCNC: 2865 NG/ML (ref 22–322)
FIBRINOGEN PPP-MCNC: 452 MG/DL (ref 215–460)
HCT VFR BLD AUTO: 46.8 % (ref 42–52)
HGB BLD-MCNC: 15.1 G/DL (ref 14–18)
IMM GRANULOCYTES # BLD AUTO: 0.23 K/UL (ref 0–0.11)
IMM GRANULOCYTES NFR BLD AUTO: 1.9 % (ref 0–0.9)
INR PPP: 0.98 (ref 0.87–1.13)
LDH SERPL L TO P-CCNC: 378 U/L (ref 107–266)
LYMPHOCYTES # BLD AUTO: 1.84 K/UL (ref 1–4.8)
LYMPHOCYTES NFR BLD: 15 % (ref 22–41)
MAGNESIUM SERPL-MCNC: 2.4 MG/DL (ref 1.5–2.5)
MCH RBC QN AUTO: 26.5 PG (ref 27–33)
MCHC RBC AUTO-ENTMCNC: 32.3 G/DL (ref 33.7–35.3)
MCV RBC AUTO: 82.2 FL (ref 81.4–97.8)
MONOCYTES # BLD AUTO: 0.96 K/UL (ref 0–0.85)
MONOCYTES NFR BLD AUTO: 7.8 % (ref 0–13.4)
NEUTROPHILS # BLD AUTO: 9.17 K/UL (ref 1.82–7.42)
NEUTROPHILS NFR BLD: 75 % (ref 44–72)
NRBC # BLD AUTO: 0 K/UL
NRBC BLD-RTO: 0 /100 WBC
NT-PROBNP SERPL IA-MCNC: 60 PG/ML (ref 0–125)
PLATELET # BLD AUTO: 395 K/UL (ref 164–446)
PMV BLD AUTO: 9 FL (ref 9–12.9)
PROT SERPL-MCNC: 6.6 G/DL (ref 6–8.2)
PROTHROMBIN TIME: 13.3 SEC (ref 12–14.6)
RBC # BLD AUTO: 5.69 M/UL (ref 4.7–6.1)
TROPONIN T SERPL-MCNC: 14 NG/L (ref 6–19)
WBC # BLD AUTO: 12.2 K/UL (ref 4.8–10.8)

## 2020-06-29 PROCEDURE — 82728 ASSAY OF FERRITIN: CPT

## 2020-06-29 PROCEDURE — 99231 SBSQ HOSP IP/OBS SF/LOW 25: CPT | Mod: CS | Performed by: INTERNAL MEDICINE

## 2020-06-29 PROCEDURE — A9270 NON-COVERED ITEM OR SERVICE: HCPCS | Performed by: HOSPITALIST

## 2020-06-29 PROCEDURE — 700102 HCHG RX REV CODE 250 W/ 637 OVERRIDE(OP): Performed by: INTERNAL MEDICINE

## 2020-06-29 PROCEDURE — 83880 ASSAY OF NATRIURETIC PEPTIDE: CPT

## 2020-06-29 PROCEDURE — 85610 PROTHROMBIN TIME: CPT

## 2020-06-29 PROCEDURE — 700111 HCHG RX REV CODE 636 W/ 250 OVERRIDE (IP): Performed by: INTERNAL MEDICINE

## 2020-06-29 PROCEDURE — 85730 THROMBOPLASTIN TIME PARTIAL: CPT

## 2020-06-29 PROCEDURE — 83735 ASSAY OF MAGNESIUM: CPT

## 2020-06-29 PROCEDURE — 82550 ASSAY OF CK (CPK): CPT

## 2020-06-29 PROCEDURE — 85384 FIBRINOGEN ACTIVITY: CPT

## 2020-06-29 PROCEDURE — 85379 FIBRIN DEGRADATION QUANT: CPT

## 2020-06-29 PROCEDURE — 83615 LACTATE (LD) (LDH) ENZYME: CPT

## 2020-06-29 PROCEDURE — 700102 HCHG RX REV CODE 250 W/ 637 OVERRIDE(OP): Performed by: HOSPITALIST

## 2020-06-29 PROCEDURE — 85025 COMPLETE CBC W/AUTO DIFF WBC: CPT

## 2020-06-29 PROCEDURE — 86140 C-REACTIVE PROTEIN: CPT

## 2020-06-29 PROCEDURE — 770021 HCHG ROOM/CARE - ISO PRIVATE

## 2020-06-29 PROCEDURE — 99232 SBSQ HOSP IP/OBS MODERATE 35: CPT | Performed by: INTERNAL MEDICINE

## 2020-06-29 PROCEDURE — 36415 COLL VENOUS BLD VENIPUNCTURE: CPT

## 2020-06-29 PROCEDURE — A9270 NON-COVERED ITEM OR SERVICE: HCPCS | Performed by: INTERNAL MEDICINE

## 2020-06-29 PROCEDURE — 83520 IMMUNOASSAY QUANT NOS NONAB: CPT

## 2020-06-29 PROCEDURE — 94760 N-INVAS EAR/PLS OXIMETRY 1: CPT

## 2020-06-29 PROCEDURE — 80076 HEPATIC FUNCTION PANEL: CPT

## 2020-06-29 PROCEDURE — 84484 ASSAY OF TROPONIN QUANT: CPT

## 2020-06-29 PROCEDURE — 94640 AIRWAY INHALATION TREATMENT: CPT

## 2020-06-29 RX ADMIN — LOSARTAN POTASSIUM 50 MG: 50 TABLET, FILM COATED ORAL at 06:14

## 2020-06-29 RX ADMIN — METOPROLOL TARTRATE 25 MG: 25 TABLET, FILM COATED ORAL at 06:14

## 2020-06-29 RX ADMIN — ENOXAPARIN SODIUM 120 MG: 120 INJECTION SUBCUTANEOUS at 06:14

## 2020-06-29 RX ADMIN — ALBUTEROL SULFATE 2 PUFF: 90 AEROSOL, METERED RESPIRATORY (INHALATION) at 15:24

## 2020-06-29 RX ADMIN — ALBUTEROL SULFATE 2 PUFF: 90 AEROSOL, METERED RESPIRATORY (INHALATION) at 07:55

## 2020-06-29 RX ADMIN — AMLODIPINE BESYLATE 10 MG: 5 TABLET ORAL at 06:14

## 2020-06-29 RX ADMIN — ENOXAPARIN SODIUM 120 MG: 120 INJECTION SUBCUTANEOUS at 17:05

## 2020-06-29 RX ADMIN — DEXAMETHASONE 6 MG: 6 TABLET ORAL at 06:15

## 2020-06-29 RX ADMIN — ALBUTEROL SULFATE 2 PUFF: 90 AEROSOL, METERED RESPIRATORY (INHALATION) at 21:01

## 2020-06-29 RX ADMIN — METOPROLOL TARTRATE 25 MG: 25 TABLET, FILM COATED ORAL at 17:04

## 2020-06-29 RX ADMIN — ALBUTEROL SULFATE 2 PUFF: 90 AEROSOL, METERED RESPIRATORY (INHALATION) at 11:41

## 2020-06-29 ASSESSMENT — ENCOUNTER SYMPTOMS
NAUSEA: 0
DIARRHEA: 0
HEMOPTYSIS: 0
NERVOUS/ANXIOUS: 0
ABDOMINAL PAIN: 0
BACK PAIN: 0
MEMORY LOSS: 0
HEADACHES: 0
COUGH: 1
MYALGIAS: 0
VOMITING: 0
DIZZINESS: 0
WEAKNESS: 1
DIAPHORESIS: 0
COUGH: 0
SHORTNESS OF BREATH: 1
FEVER: 0

## 2020-06-29 ASSESSMENT — PATIENT HEALTH QUESTIONNAIRE - PHQ9
2. FEELING DOWN, DEPRESSED, IRRITABLE, OR HOPELESS: NOT AT ALL
1. LITTLE INTEREST OR PLEASURE IN DOING THINGS: NOT AT ALL
SUM OF ALL RESPONSES TO PHQ9 QUESTIONS 1 AND 2: 0

## 2020-06-29 NOTE — CARE PLAN
Problem: Nutritional:  Goal: Achieve adequate nutritional intake  Description: Patient will consume >50% of meals/supplements.  Outcome: MET  Per chart pt PO improved to % of meals. Pt is eating well. RD to monitor per department policy.

## 2020-06-29 NOTE — CARE PLAN
No change in patient condition from last night. Patient is now on 2l NC. No complaints or concerns at this time. Nurse will continue to monitor.   Problem: Communication  Goal: The ability to communicate needs accurately and effectively will improve  Outcome: PROGRESSING AS EXPECTED     Problem: Safety  Goal: Will remain free from injury  Outcome: PROGRESSING AS EXPECTED  Goal: Will remain free from falls  Outcome: PROGRESSING AS EXPECTED     Problem: Infection  Goal: Will remain free from infection  Outcome: PROGRESSING AS EXPECTED     Problem: Venous Thromboembolism (VTW)/Deep Vein Thrombosis (DVT) Prevention:  Goal: Patient will participate in Venous Thrombosis (VTE)/Deep Vein Thrombosis (DVT)Prevention Measures  Outcome: PROGRESSING AS EXPECTED     Problem: Bowel/Gastric:  Goal: Normal bowel function is maintained or improved  Outcome: PROGRESSING AS EXPECTED  Goal: Will not experience complications related to bowel motility  Outcome: PROGRESSING AS EXPECTED     Problem: Knowledge Deficit  Goal: Knowledge of disease process/condition, treatment plan, diagnostic tests, and medications will improve  Outcome: PROGRESSING AS EXPECTED  Goal: Knowledge of the prescribed therapeutic regimen will improve  Outcome: PROGRESSING AS EXPECTED     Problem: Discharge Barriers/Planning  Goal: Patient's continuum of care needs will be met  Outcome: PROGRESSING AS EXPECTED     Problem: Pain Management  Goal: Pain level will decrease to patient's comfort goal  Outcome: PROGRESSING AS EXPECTED     Problem: Respiratory:  Goal: Respiratory status will improve  Outcome: PROGRESSING AS EXPECTED

## 2020-06-29 NOTE — PROGRESS NOTES
"Infectious Disease Progress Note    Author: Miky Crocker M.D. Date & Time of service: 6/29/2020  9:40 AM    Chief Complaint:  Follow-up for COVID-19 pneumonia    Interval History:  6/22 T-max 99.5 no CBC done today.  Fever curve slightly improved.  Down to 4 L nasal cannula.  Received convalescent plasma and first dose of remdesivir yesterday and tolerated them.  Feels as if his fevers are improving.  Still struggles with shortness of breath  6/23 afebrile WBC 5.1, oxygen requirements stable.  States his fevers have resolved.  He continues to have shortness of breath with minimal exertion.  His nonproductive cough is also more consistent.  No diarrhea.  6/24 afebrile WBC 4.7.  Remains on 5 L nasal cannula without any changes. Breathing slowly improving and he states he has more energy. Poor appetite  6/25 AFebrile, WBC 2.6 received 2nd unit of plasma yesterday and had some hives on his left arm which have now resolved. On 4 liters today.  Patient is prone and states when he moves into a sitting position he has some trouble breathing.  He also states that food taste salty to him, even his ice cream.  CTA chest canceled  6/26 Afebrile. On 6L NC, HR in 70-80s, SBP in 120-130s. WBC 6.5, plt 227K. Creatinine 0.64. ALT/AST stable in 50s/60s  ,      6/27 Afebrile with Tm 36.3C, 6L oxymask with sat >92%, hemodynamically stable. Feeling his appetite is back. Change of salty taste is also coming back. Proned \"most of day\" yesterday. AST//130    6/28 Afebrile with Tm 37.2. On 4L NC, self proning. HD stable. AST/ALT is slowly going up today 116/212. Pt off RDV since 6/25. Pt continues to feel better, breathing better. Afebrile. The salty taste has resolved.      6/29 afebrile, white count up to 12.2, now down to room air.  Energy overall him proved compared to admission.    Labs Reviewed and Medications Reviewed.    Review of Systems:  Review of Systems   Constitutional: Negative for diaphoresis, fever " and malaise/fatigue.   Respiratory: Positive for cough and shortness of breath. Negative for hemoptysis.         Continues to improve   Cardiovascular: Negative for chest pain.   Gastrointestinal: Negative for abdominal pain, diarrhea, nausea and vomiting.   Musculoskeletal: Negative for myalgias.   Neurological: Negative for dizziness and headaches.       Hemodynamics:  Temp (24hrs), Av.4 °C (97.6 °F), Min:36 °C (96.8 °F), Max:37 °C (98.6 °F)  Temperature: 37 °C (98.6 °F)  Pulse  Av.2  Min: 64  Max: 107   Blood Pressure: 103/68       Physical Exam:  Physical Exam  Vitals signs and nursing note reviewed.   Constitutional:       Appearance: Normal appearance. He is not ill-appearing.   HENT:      Head: Normocephalic and atraumatic.      Mouth/Throat:      Mouth: Mucous membranes are moist.   Eyes:      General:         Right eye: No discharge.         Left eye: No discharge.      Conjunctiva/sclera: Conjunctivae normal.   Neck:      Musculoskeletal: Normal range of motion.   Cardiovascular:      Rate and Rhythm: Normal rate and regular rhythm.   Pulmonary:      Effort: Pulmonary effort is normal. No respiratory distress.   Abdominal:      General: Abdomen is flat.   Musculoskeletal:      Right lower leg: No edema.      Left lower leg: No edema.   Skin:     Capillary Refill: Capillary refill takes less than 2 seconds.      Findings: No erythema or rash.   Neurological:      General: No focal deficit present.      Mental Status: He is alert.   Psychiatric:         Mood and Affect: Mood normal.         Behavior: Behavior normal.      Comments: Pleasant         Meds:    Current Facility-Administered Medications:   •  albuterol  •  albuterol  •  dexamethasone  •  enoxaparin (LOVENOX) injection  •  ondansetron  •  ondansetron  •  amLODIPine  •  losartan  •  metoprolol  •  acetaminophen    Labs:  Recent Labs     20  0702 20  0415 20  0341   WBC 10.2 10.8 12.2*   RBC 5.32 5.44 5.69   HEMOGLOBIN  "14.2 14.8 15.1   HEMATOCRIT 44.2 46.0 46.8   MCV 83.1 84.6 82.2   MCH 26.7* 27.2 26.5*   RDW 40.5 41.3 39.2   PLATELETCT 298 294 395   MPV 9.4 8.9* 9.0   NEUTSPOLYS 82.90* 79.20* 75.00*   LYMPHOCYTES 9.50* 12.90* 15.00*   MONOCYTES 6.50 6.60 7.80   EOSINOPHILS 0.00 0.00 0.00   BASOPHILS 0.20 0.20 0.30     Recent Labs     06/27/20  0702 06/28/20 0415 06/29/20  0341   SODIUM  --  137  --    POTASSIUM  --  4.6  --    CHLORIDE  --  103  --    CO2  --  26  --    GLUCOSE  --  115*  --    BUN  --  18  --    CPKTOTAL 173*  --  170*     Recent Labs     06/27/20 0702 06/28/20 0415 06/29/20  0341   ALBUMIN 3.2 3.2 3.6   TBILIRUBIN 0.6 0.5 0.6   ALKPHOSPHAT 50 52 54   TOTPROTEIN 6.3 6.2 6.6   ALTSGPT 130* 212* 220*   ASTSGOT 107* 116* 78*   CREATININE  --  0.82  --        Imaging:  Dx-chest-portable (1 View)    Result Date: 6/20/2020 6/20/2020 11:28 PM HISTORY/REASON FOR EXAM:  Shortness of Breath Fever. Suspect possible Covid-19 exposure rule out TECHNIQUE/EXAM DESCRIPTION AND NUMBER OF VIEWS: Single portable view of the chest. COMPARISON: None FINDINGS: Patchy bilateral lower lung opacities No pleural effusion. No pneumothorax. Normal cardiopericardial silhouette.     Patchy bilateral lower lung opacities, atelectasis or multifocal pneumonia.      Micro:  Results     Procedure Component Value Units Date/Time    Blood Culture [[id number]] Collected:  06/20/20 2321    Order Status:  Completed Specimen:  Blood from Peripheral Updated:  06/26/20 0100     Significant Indicator NEG     Source BLD     Site PERIPHERAL     Culture Result No growth after 5 days of incubation.    Narrative:       Droplet, Contact, and Eye Protection  2 of 2 blood culture x2  Sites order. Per Hospital Policy:  Only change Specimen Src: to \"Line\" if specified by physician  order.  No site indicated    Blood Culture [[id number]] Collected:  06/20/20 2322    Order Status:  Completed Specimen:  Blood from Peripheral Updated:  06/26/20 0100     Significant " "Indicator NEG     Source BLD     Site PERIPHERAL     Culture Result No growth after 5 days of incubation.    Narrative:       Droplet, Contact, and Eye Protection  1 of 2 for Blood Culture x 2 sites order. Per Hospital  Policy: Only change Specimen Src: to \"Line\" if specified by  physician order.  No site indicated        Assessment/Plan:  Acute hypoxic respiratory failure  COVID-19 pneumonia  Fevers resolved  White count going up, likely secondary to dexamethasone  Overall improved clinically  Now down to room air    -Continue supportive care.  Self proning, oxygen supplementation, judicious use of IV fluids  - 16 hour awake proning  -Completed 5 day course 6/21-6/25/2020 of remdesivir.    -Monitor LFTs.  AST trending down, ALT remains up but stable, 220 today. Pt completed RDV on 6/25. Will monitor at this time.   -Convalescent plasma given on 6/21/2020 and 6/24/2020  -Avoid hepatotoxic medications.   -Blood cultures from 6/20, no growth till date  -Continue therapeutic anticoagulation given elevated d-dimer and increased risk for thromboembolic disease in COVID-19  -On dexamethasone 6mg daily for total 10 days     -Inflammatory markers:  CRP: 4.14--> 4.27-->3.83 --> 0.81 --> 0.35  Ferritin:3152--> 3132-->2934 --> 2924 --> 2865  LDH: 370--> 401-->463 --> 426 --> 378  D-dimer: 0.75--> 1.10-->0.53 --> 0.29 --> 0.28  Procalcitonin: 0.41  IL-6: < 2.0    Will recheck above labs and monitor every 48 hours     I informed his wife, Alber Neves today 6/29 and updated his condition. All questions are answered.      Discussed with .    ID will sign off.  Please call with questions.  "

## 2020-06-29 NOTE — PROGRESS NOTES
Hospital Medicine Daily Progress Note    Date of Service  6/29/2020    Chief Complaint  49 y.o. male admitted 6/20/2020 with fever and SOB    Hospital Course    PMHx HTN.  Presenting with cough and SOB.  outpt COVID neg on 6/15.  He does however have known + exposure to family members.  Sx's developed and worsened over 5 days. In ED temp 102 and 87% on RA.   CXR showing B patchy infiltrates.  Admitted on C3/Zithro for community acquired pneumonia and acute hypoxic resp failure      Interval Problem Update    Now on room air  Improving appetite    LFT is improving  Encourage activity    Consultants/Specialty  ID  Pulmonary    Code Status  full    Disposition    To home in 2 to 3 days with clinical improvement    Monitor LFTs      Review of Systems  Review of Systems   Constitutional: Negative for diaphoresis and malaise/fatigue.   Respiratory: Positive for shortness of breath. Negative for cough.    Cardiovascular: Negative for chest pain and leg swelling.   Gastrointestinal: Negative for abdominal pain and nausea.   Genitourinary: Negative for urgency.   Musculoskeletal: Negative for back pain and myalgias.   Neurological: Positive for weakness. Negative for headaches.   Psychiatric/Behavioral: Negative for memory loss. The patient is not nervous/anxious.         Physical Exam  Temp:  [36 °C (96.8 °F)-37 °C (98.6 °F)] 37 °C (98.6 °F)  Pulse:  [64-88] 88  Resp:  [14-20] 18  BP: (103-131)/(60-83) 103/68  SpO2:  [90 %-97 %] 94 %    Physical Exam  Vitals signs reviewed.   Constitutional:       General: He is not in acute distress.     Appearance: He is well-developed. He is ill-appearing. He is not toxic-appearing or diaphoretic.   HENT:      Head: Normocephalic and atraumatic.   Eyes:      Conjunctiva/sclera: Conjunctivae normal.   Neck:      Vascular: No JVD.   Cardiovascular:      Rate and Rhythm: Normal rate.   Pulmonary:      Effort: Respiratory distress present.      Breath sounds: No stridor. No wheezing, rhonchi  or rales.      Comments: Improving breath sounds, no expiratory wheezes noted    Chest:      Chest wall: No tenderness.   Abdominal:      General: There is no distension.      Palpations: Abdomen is soft.      Tenderness: There is no abdominal tenderness.   Musculoskeletal:         General: No swelling, tenderness or deformity.   Skin:     General: Skin is warm.      Coloration: Skin is not pale.   Neurological:      General: No focal deficit present.      Mental Status: He is oriented to person, place, and time.      Cranial Nerves: No cranial nerve deficit.      Sensory: No sensory deficit.      Motor: Weakness present.      Coordination: Coordination normal.   Psychiatric:         Mood and Affect: Mood normal.         Behavior: Behavior normal.         Fluids    Intake/Output Summary (Last 24 hours) at 6/29/2020 1435  Last data filed at 6/29/2020 1300  Gross per 24 hour   Intake 1200 ml   Output --   Net 1200 ml       Laboratory  Recent Labs     06/27/20  0702 06/28/20  0415 06/29/20  0341   WBC 10.2 10.8 12.2*   RBC 5.32 5.44 5.69   HEMOGLOBIN 14.2 14.8 15.1   HEMATOCRIT 44.2 46.0 46.8   MCV 83.1 84.6 82.2   MCH 26.7* 27.2 26.5*   MCHC 32.1* 32.2* 32.3*   RDW 40.5 41.3 39.2   PLATELETCT 298 294 395   MPV 9.4 8.9* 9.0     Recent Labs     06/28/20  0415   SODIUM 137   POTASSIUM 4.6   CHLORIDE 103   CO2 26   GLUCOSE 115*   BUN 18   CREATININE 0.82   CALCIUM 8.6     Recent Labs     06/27/20  0702 06/29/20  0341   APTT 38.4* 33.1   INR 1.07 0.98               Imaging  DX-CHEST-PORTABLE (1 VIEW)   Final Result         Patchy bilateral lower lung opacities, atelectasis or multifocal pneumonia.           Assessment/Plan  * Pneumonia due to COVID-19 virus- (present on admission)  Assessment & Plan  DC tele  Wean 02 as tolerated  Cont with empiric IV abx   Follow up covid 19 labs ordered   Blood cx neg  Remdesivir D2  S/p convalescent plasma 6/21  ID following   Clinically is improved    6/23 DDimer>1: increaseing crp,  change to full dose anticoagulation    6/24 increasing sob with tachypnea, on 4L face mask sat 90%  ABG 7.41/ PO2 20  pulm consulted, Dr. Bautista, appreciate input  - CT PE ordered    6/25 sob improving, now neutropenic  Improving CRP and Ddimer  -LDH elevated, monitor  On decadron per pulm x 10 days or dc  - repeat convalescent serum, ID to assist  - on Remdesivir    Per pulm, ABG, PO2 reviewed    6/26 monitor CRP, d-dimer and LDH to 48 hours  Respiratory symptoms improving, intermittently requiring 4 to 6 L oxygen supplementation  Clinically improving  Monitor closely  Encourage I-S use  Status post plasma x2, completed Remdesivir on June 25 6/27 decreasing CRP, d-dimer, CPK and LDH  Still requiring 6 L oxygen supplementation, continue prone positioning    6/28 improving, 4L    6/29 improving inflammatory markers, mild leukocytosis today  No new signs of infection  Encourage I-S use  On room air    Reactive airway disease with acute exacerbation  Assessment & Plan  Pt states he only has sx's with exercise or with his allergies.  Start scheduled BD therapy  Holding on steroids but will have a low thershold to initiate if any worsening of his COVID disease or worsening of his resp exam    Abnormal LFTs  Assessment & Plan  Mild, likely due to covid   Cont to trend    6/27 increasing LFT, monitor closely    6/28 increasing LFT, monitor  S/p RDV    6/29 improving LFT  We will monitor every 48 hours    Hyponatremia  Assessment & Plan  Mild, cont to trend    Acute hypoxemic respiratory failure (HCC)  Assessment & Plan  Wean 02 as tolerated  COVID +, Hx reactive airway disease, possible bacterial superinfection    BMI 40.0-44.9, adult (HCC)- (present on admission)  Assessment & Plan  Encourage weight loss    HTN (hypertension), benign- (present on admission)  Assessment & Plan  Resume home CCB, BB and ARB with parameters       VTE prophylaxis: enoxaparin

## 2020-06-29 NOTE — CARE PLAN
Assumed day shift care at start of shift  Patient a+o x 4  Denies pain  Denies sob / +mckeon / 90-93% on room air at rest / ls dim throughout  Ate 100% of breakfast this am    Patient encouraged to cough and deep breath  Afebrile, vss  No needs at this time, wctm     Fall precautions/hourly rounding maintained, call light within reach and functioning, all items within reach.  Patient encouraged to call for assistance, poc reviewed with patient, ?'s/concerns answered    Problem: Respiratory:  Goal: Respiratory status will improve  Outcome: PROGRESSING AS EXPECTED

## 2020-06-29 NOTE — PROGRESS NOTES
Hospital Medicine Daily Progress Note    Date of Service  6/28/2020    Chief Complaint  49 y.o. male admitted 6/20/2020 with fever and SOB    Hospital Course    PMHx HTN.  Presenting with cough and SOB.  outpt COVID neg on 6/15.  He does however have known + exposure to family members.  Sx's developed and worsened over 5 days. In ED temp 102 and 87% on RA.   CXR showing B patchy infiltrates.  Admitted on C3/Zithro for community acquired pneumonia and acute hypoxic resp failure      Interval Problem Update    Feels better today  Improving appetite and activity  Denies abdominal pain  LFTs slightly elevated    On 2 to 4 L oxygen    Consultants/Specialty  ID  Pulmonary    Code Status  full    Disposition    TBD  Discharge to home in 3 to 4 days with clinical improvement, continue to monitor  Will likely need oxygen on discharge  Monitor LFTs      Review of Systems  Review of Systems   Constitutional: Negative for chills, fever and malaise/fatigue.   Respiratory: Positive for shortness of breath. Negative for cough and wheezing.    Cardiovascular: Negative for chest pain, claudication and leg swelling.   Gastrointestinal: Negative for abdominal pain, heartburn and nausea.   Genitourinary: Negative for dysuria and urgency.   Musculoskeletal: Negative for back pain and myalgias.   Neurological: Positive for weakness. Negative for dizziness and headaches.   Psychiatric/Behavioral: Negative for depression and memory loss. The patient is not nervous/anxious.         Physical Exam  Temp:  [36.3 °C (97.3 °F)-37.2 °C (99 °F)] 36.3 °C (97.3 °F)  Pulse:  [67-86] 86  Resp:  [18-20] 20  BP: (105-120)/(60-82) 112/60  SpO2:  [93 %-96 %] 95 %    Physical Exam  Vitals signs reviewed.   Constitutional:       General: He is not in acute distress.     Appearance: He is well-developed. He is ill-appearing. He is not toxic-appearing.   HENT:      Head: Normocephalic and atraumatic.   Eyes:      Conjunctiva/sclera: Conjunctivae normal.    Neck:      Musculoskeletal: No neck rigidity.      Vascular: No JVD.   Cardiovascular:      Rate and Rhythm: Normal rate.   Pulmonary:      Effort: Respiratory distress present.      Breath sounds: No stridor. No wheezing or rhonchi.      Comments: Improving breath sounds, no expiratory wheezes noted    Abdominal:      General: There is no distension.      Palpations: Abdomen is soft. There is no mass.      Tenderness: There is no abdominal tenderness.   Musculoskeletal:         General: No swelling or tenderness.   Skin:     General: Skin is warm.      Coloration: Skin is not jaundiced or pale.   Neurological:      General: No focal deficit present.      Mental Status: He is oriented to person, place, and time. Mental status is at baseline.      Cranial Nerves: No cranial nerve deficit.      Sensory: No sensory deficit.      Motor: Weakness present.      Coordination: Coordination normal.   Psychiatric:         Mood and Affect: Mood normal.         Behavior: Behavior normal.         Thought Content: Thought content normal.         Fluids    Intake/Output Summary (Last 24 hours) at 6/28/2020 1942  Last data filed at 6/28/2020 1700  Gross per 24 hour   Intake 1440 ml   Output --   Net 1440 ml       Laboratory  Recent Labs     06/26/20  0521 06/27/20  0702 06/28/20  0415   WBC 6.5 10.2 10.8   RBC 5.07 5.32 5.44   HEMOGLOBIN 13.8* 14.2 14.8   HEMATOCRIT 42.4 44.2 46.0   MCV 83.6 83.1 84.6   MCH 27.2 26.7* 27.2   MCHC 32.5* 32.1* 32.2*   RDW 41.9 40.5 41.3   PLATELETCT 227 298 294   MPV 10.4 9.4 8.9*     Recent Labs     06/26/20  0521 06/28/20  0415   SODIUM 134* 137   POTASSIUM 4.5 4.6   CHLORIDE 104 103   CO2 20 26   GLUCOSE 135* 115*   BUN 16 18   CREATININE 0.64 0.82   CALCIUM 8.6 8.6     Recent Labs     06/27/20  0702   APTT 38.4*   INR 1.07               Imaging  DX-CHEST-PORTABLE (1 VIEW)   Final Result         Patchy bilateral lower lung opacities, atelectasis or multifocal pneumonia.            Assessment/Plan  * Pneumonia due to COVID-19 virus- (present on admission)  Assessment & Plan  DC tele  Wean 02 as tolerated  Cont with empiric IV abx   Follow up covid 19 labs ordered   Blood cx neg  Remdesivir D2  S/p convalescent plasma 6/21  ID following   Clinically is improved    6/23 DDimer>1: increaseing crp, change to full dose anticoagulation    6/24 increasing sob with tachypnea, on 4L face mask sat 90%  ABG 7.41/ PO2 20  pulm consulted, Dr. Bautista, appreciate input  - CT PE ordered    6/25 sob improving, now neutropenic  Improving CRP and Ddimer  -LDH elevated, monitor  On decadron per pulm x 10 days or dc  - repeat convalescent serum, ID to assist  - on Remdesivir    Per pulm, ABG, PO2 reviewed    6/26 monitor CRP, d-dimer and LDH to 48 hours  Respiratory symptoms improving, intermittently requiring 4 to 6 L oxygen supplementation  Clinically improving  Monitor closely  Encourage I-S use  Status post plasma x2, completed Remdesivir on June 25 6/27 decreasing CRP, d-dimer, CPK and LDH  Still requiring 6 L oxygen supplementation, continue prone positioning    6/28 improving, 4L      Reactive airway disease with acute exacerbation  Assessment & Plan  Pt states he only has sx's with exercise or with his allergies.  Start scheduled BD therapy  Holding on steroids but will have a low thershold to initiate if any worsening of his COVID disease or worsening of his resp exam    Abnormal LFTs  Assessment & Plan  Mild, likely due to covid   Cont to trend    6/27 increasing LFT, monitor closely    6/28 increasing LFT, monitor  S/p RDV    Hyponatremia  Assessment & Plan  Mild, cont to trend    Acute hypoxemic respiratory failure (HCC)  Assessment & Plan  Wean 02 as tolerated  COVID +, Hx reactive airway disease, possible bacterial superinfection    BMI 40.0-44.9, adult (HCC)- (present on admission)  Assessment & Plan  Encourage weight loss    HTN (hypertension), benign- (present on admission)  Assessment &  Plan  Resume home CCB, BB and ARB with parameters       VTE prophylaxis: enoxaparin

## 2020-06-30 LAB
ALBUMIN SERPL BCP-MCNC: 3.5 G/DL (ref 3.2–4.9)
ALBUMIN/GLOB SERPL: 1.2 G/DL
ALP SERPL-CCNC: 54 U/L (ref 30–99)
ALT SERPL-CCNC: 196 U/L (ref 2–50)
ANION GAP SERPL CALC-SCNC: 13 MMOL/L (ref 7–16)
AST SERPL-CCNC: 51 U/L (ref 12–45)
BILIRUB SERPL-MCNC: 0.6 MG/DL (ref 0.1–1.5)
BUN SERPL-MCNC: 22 MG/DL (ref 8–22)
CALCIUM SERPL-MCNC: 9 MG/DL (ref 8.5–10.5)
CHLORIDE SERPL-SCNC: 99 MMOL/L (ref 96–112)
CO2 SERPL-SCNC: 23 MMOL/L (ref 20–33)
CREAT SERPL-MCNC: 0.76 MG/DL (ref 0.5–1.4)
GLOBULIN SER CALC-MCNC: 2.9 G/DL (ref 1.9–3.5)
GLUCOSE SERPL-MCNC: 99 MG/DL (ref 65–99)
IL6 SERPL-MCNC: <2 PG/ML
POTASSIUM SERPL-SCNC: 4.9 MMOL/L (ref 3.6–5.5)
PROT SERPL-MCNC: 6.4 G/DL (ref 6–8.2)
SODIUM SERPL-SCNC: 135 MMOL/L (ref 135–145)

## 2020-06-30 PROCEDURE — A9270 NON-COVERED ITEM OR SERVICE: HCPCS | Performed by: HOSPITALIST

## 2020-06-30 PROCEDURE — A9270 NON-COVERED ITEM OR SERVICE: HCPCS | Performed by: INTERNAL MEDICINE

## 2020-06-30 PROCEDURE — 99232 SBSQ HOSP IP/OBS MODERATE 35: CPT | Performed by: INTERNAL MEDICINE

## 2020-06-30 PROCEDURE — 94640 AIRWAY INHALATION TREATMENT: CPT

## 2020-06-30 PROCEDURE — 700102 HCHG RX REV CODE 250 W/ 637 OVERRIDE(OP): Performed by: HOSPITALIST

## 2020-06-30 PROCEDURE — 700111 HCHG RX REV CODE 636 W/ 250 OVERRIDE (IP): Performed by: INTERNAL MEDICINE

## 2020-06-30 PROCEDURE — 770021 HCHG ROOM/CARE - ISO PRIVATE

## 2020-06-30 PROCEDURE — 80053 COMPREHEN METABOLIC PANEL: CPT

## 2020-06-30 PROCEDURE — 700102 HCHG RX REV CODE 250 W/ 637 OVERRIDE(OP): Performed by: INTERNAL MEDICINE

## 2020-06-30 PROCEDURE — 94760 N-INVAS EAR/PLS OXIMETRY 1: CPT

## 2020-06-30 PROCEDURE — 36415 COLL VENOUS BLD VENIPUNCTURE: CPT

## 2020-06-30 RX ADMIN — METOPROLOL TARTRATE 25 MG: 25 TABLET, FILM COATED ORAL at 04:17

## 2020-06-30 RX ADMIN — ALBUTEROL SULFATE 2 PUFF: 90 AEROSOL, METERED RESPIRATORY (INHALATION) at 11:20

## 2020-06-30 RX ADMIN — DEXAMETHASONE 6 MG: 6 TABLET ORAL at 04:17

## 2020-06-30 RX ADMIN — METOPROLOL TARTRATE 25 MG: 25 TABLET, FILM COATED ORAL at 16:50

## 2020-06-30 RX ADMIN — ALBUTEROL SULFATE 2 PUFF: 90 AEROSOL, METERED RESPIRATORY (INHALATION) at 20:42

## 2020-06-30 RX ADMIN — AMLODIPINE BESYLATE 10 MG: 5 TABLET ORAL at 04:17

## 2020-06-30 RX ADMIN — ENOXAPARIN SODIUM 120 MG: 120 INJECTION SUBCUTANEOUS at 16:49

## 2020-06-30 RX ADMIN — ALBUTEROL SULFATE 2 PUFF: 90 AEROSOL, METERED RESPIRATORY (INHALATION) at 08:09

## 2020-06-30 RX ADMIN — LOSARTAN POTASSIUM 50 MG: 50 TABLET, FILM COATED ORAL at 04:17

## 2020-06-30 RX ADMIN — ALBUTEROL SULFATE 2 PUFF: 90 AEROSOL, METERED RESPIRATORY (INHALATION) at 15:01

## 2020-06-30 RX ADMIN — ENOXAPARIN SODIUM 120 MG: 120 INJECTION SUBCUTANEOUS at 04:17

## 2020-06-30 ASSESSMENT — ENCOUNTER SYMPTOMS
DIAPHORESIS: 0
NERVOUS/ANXIOUS: 0
ABDOMINAL PAIN: 0
CHILLS: 0
DEPRESSION: 0
FEVER: 0
MYALGIAS: 0
HEARTBURN: 0
DIZZINESS: 0
COUGH: 0
HEADACHES: 0
WEAKNESS: 1
SHORTNESS OF BREATH: 1
SPUTUM PRODUCTION: 0
BACK PAIN: 0
PALPITATIONS: 0

## 2020-06-30 ASSESSMENT — PATIENT HEALTH QUESTIONNAIRE - PHQ9
1. LITTLE INTEREST OR PLEASURE IN DOING THINGS: NOT AT ALL
2. FEELING DOWN, DEPRESSED, IRRITABLE, OR HOPELESS: NOT AT ALL
SUM OF ALL RESPONSES TO PHQ9 QUESTIONS 1 AND 2: 0

## 2020-06-30 NOTE — PROGRESS NOTES
Hospital Medicine Daily Progress Note    Date of Service  6/30/2020    Chief Complaint  49 y.o. male admitted 6/20/2020 with fever and SOB    Hospital Course    PMHx HTN.  Presenting with cough and SOB.  outpt COVID neg on 6/15.  He does however have known + exposure to family members.  Sx's developed and worsened over 5 days. In ED temp 102 and 87% on RA.   CXR showing B patchy infiltrates.  Admitted on C3/Zithro for community acquired pneumonia and acute hypoxic resp failure      Interval Problem Update    Feels better today  Improving activity tolerance  On room air  Shortness of breath improving    Consultants/Specialty  ID  Pulmonary    Code Status  full    Disposition    To home in 1-2 days with clinical improvement    Monitor LFTs. cbc      Review of Systems  Review of Systems   Constitutional: Negative for chills, diaphoresis, fever and malaise/fatigue.   Respiratory: Positive for shortness of breath. Negative for cough and sputum production.    Cardiovascular: Negative for chest pain, palpitations and leg swelling.   Gastrointestinal: Negative for abdominal pain and heartburn.   Genitourinary: Negative for urgency.   Musculoskeletal: Negative for back pain and myalgias.   Neurological: Positive for weakness. Negative for dizziness and headaches.   Psychiatric/Behavioral: Negative for depression. The patient is not nervous/anxious.         Physical Exam  Temp:  [36.2 °C (97.1 °F)-37.6 °C (99.7 °F)] 36.3 °C (97.3 °F)  Pulse:  [71-86] 81  Resp:  [17-20] 20  BP: (100-119)/(55-68) 100/65  SpO2:  [91 %-95 %] 94 %    Physical Exam  Vitals signs reviewed.   Constitutional:       Appearance: He is well-developed. He is ill-appearing. He is not diaphoretic.   HENT:      Head: Normocephalic and atraumatic.   Eyes:      Extraocular Movements: Extraocular movements intact.      Conjunctiva/sclera: Conjunctivae normal.      Pupils: Pupils are equal, round, and reactive to light.   Neck:      Vascular: No JVD.    Cardiovascular:      Rate and Rhythm: Normal rate.   Pulmonary:      Effort: Respiratory distress present.      Breath sounds: No stridor. No wheezing.      Comments: Improving breath sounds, no expiratory wheezes noted    Abdominal:      General: There is no distension.      Palpations: Abdomen is soft.   Musculoskeletal:         General: No swelling or tenderness.   Skin:     General: Skin is warm.      Coloration: Skin is not pale.   Neurological:      General: No focal deficit present.      Mental Status: He is oriented to person, place, and time.      Cranial Nerves: No cranial nerve deficit.      Motor: Weakness present.   Psychiatric:         Mood and Affect: Mood normal.         Behavior: Behavior normal.         Fluids    Intake/Output Summary (Last 24 hours) at 6/30/2020 1356  Last data filed at 6/30/2020 1300  Gross per 24 hour   Intake 960 ml   Output --   Net 960 ml       Laboratory  Recent Labs     06/28/20  0415 06/29/20  0341   WBC 10.8 12.2*   RBC 5.44 5.69   HEMOGLOBIN 14.8 15.1   HEMATOCRIT 46.0 46.8   MCV 84.6 82.2   MCH 27.2 26.5*   MCHC 32.2* 32.3*   RDW 41.3 39.2   PLATELETCT 294 395   MPV 8.9* 9.0     Recent Labs     06/28/20  0415 06/30/20  0353   SODIUM 137 135   POTASSIUM 4.6 4.9   CHLORIDE 103 99   CO2 26 23   GLUCOSE 115* 99   BUN 18 22   CREATININE 0.82 0.76   CALCIUM 8.6 9.0     Recent Labs     06/29/20  0341   APTT 33.1   INR 0.98               Imaging  DX-CHEST-PORTABLE (1 VIEW)   Final Result         Patchy bilateral lower lung opacities, atelectasis or multifocal pneumonia.           Assessment/Plan  * Pneumonia due to COVID-19 virus- (present on admission)  Assessment & Plan  DC tele  Wean 02 as tolerated  Cont with empiric IV abx   Follow up covid 19 labs ordered   Blood cx neg  Remdesivir D2  S/p convalescent plasma 6/21  ID following   Clinically is improved    6/23 DDimer>1: increaseing crp, change to full dose anticoagulation    6/24 increasing sob with tachypnea, on 4L  face mask sat 90%  ABG 7.41/ PO2 20  pulm consulted, Dr. Bautista, appreciate input  - CT PE ordered    6/25 sob improving, now neutropenic  Improving CRP and Ddimer  -LDH elevated, monitor  On decadron per pulm x 10 days or dc  - repeat convalescent serum, ID to assist  - on Remdesivir    Per pulm, ABG, PO2 reviewed    6/26 monitor CRP, d-dimer and LDH to 48 hours  Respiratory symptoms improving, intermittently requiring 4 to 6 L oxygen supplementation  Clinically improving  Monitor closely  Encourage I-S use  Status post plasma x2, completed Remdesivir on June 25 6/27 decreasing CRP, d-dimer, CPK and LDH  Still requiring 6 L oxygen supplementation, continue prone positioning    6/28 improving, 4L    6/29 improving inflammatory markers, mild leukocytosis today  No new signs of infection  Encourage I-S use  On room air    6/30 leukocytosis, follow-up CBC in a.m.    Reactive airway disease with acute exacerbation  Assessment & Plan  Pt states he only has sx's with exercise or with his allergies.  Start scheduled BD therapy  Holding on steroids but will have a low thershold to initiate if any worsening of his COVID disease or worsening of his resp exam    Abnormal LFTs  Assessment & Plan  Mild, likely due to covid   Cont to trend    6/27 increasing LFT, monitor closely    6/28 increasing LFT, monitor  S/p RDV    6/29 improving LFT  We will monitor every 48 hours    6/30 improving, monitor    Hyponatremia  Assessment & Plan  Mild, cont to trend    Acute hypoxemic respiratory failure (HCC)  Assessment & Plan  Wean 02 as tolerated  COVID +, Hx reactive airway disease, possible bacterial superinfection    BMI 40.0-44.9, adult (HCC)- (present on admission)  Assessment & Plan  Encourage weight loss    HTN (hypertension), benign- (present on admission)  Assessment & Plan  Resume home CCB, BB and ARB with parameters       VTE prophylaxis: enoxaparin

## 2020-06-30 NOTE — CARE PLAN
Problem: Communication  Goal: The ability to communicate needs accurately and effectively will improve  Outcome: PROGRESSING AS EXPECTED  Note: Pt. Updated on POC and encouraged to voice any questions/concerns, none at this time. Call light in reach.      Problem: Respiratory:  Goal: Respiratory status will improve  Outcome: PROGRESSING AS EXPECTED  Note: Pt. On RA, able to use IS with a volume of about ~2000. No complaints of SOB at this time.

## 2020-06-30 NOTE — CARE PLAN
Assumed day shift care at start of shift  Patient a+o x 4  Denies pain  Denies sob / +mckeon / 89-93% on room air at rest / ls ctab - upper lobes, dim at lower/mid   IS up to 2000ml  Eating 100% of meals   Patient encouraged to cough and deep breath  Afebrile, vss  No needs at this time, wctm     Fall precautions/hourly rounding maintained, call light within reach and functioning, all items within reach.  Patient encouraged to call for assistance, poc reviewed with patient, ?'s/concerns answered.    Plan is for discharge tomorrow - patient aware and agreeable to plan.     Problem: Discharge Barriers/Planning  Goal: Patient's continuum of care needs will be met  Outcome: PROGRESSING AS EXPECTED     Problem: Respiratory:  Goal: Respiratory status will improve  Outcome: PROGRESSING AS EXPECTED

## 2020-07-01 ENCOUNTER — PATIENT OUTREACH (OUTPATIENT)
Dept: HEALTH INFORMATION MANAGEMENT | Facility: OTHER | Age: 50
End: 2020-07-01

## 2020-07-01 VITALS
WEIGHT: 262.79 LBS | OXYGEN SATURATION: 96 % | BODY MASS INDEX: 42.23 KG/M2 | HEIGHT: 66 IN | HEART RATE: 76 BPM | TEMPERATURE: 98.8 F | DIASTOLIC BLOOD PRESSURE: 73 MMHG | RESPIRATION RATE: 18 BRPM | SYSTOLIC BLOOD PRESSURE: 120 MMHG

## 2020-07-01 LAB
ALBUMIN SERPL BCP-MCNC: 3.5 G/DL (ref 3.2–4.9)
ALBUMIN/GLOB SERPL: 1.4 G/DL
ALP SERPL-CCNC: 53 U/L (ref 30–99)
ALT SERPL-CCNC: 198 U/L (ref 2–50)
ANION GAP SERPL CALC-SCNC: 13 MMOL/L (ref 7–16)
AST SERPL-CCNC: 49 U/L (ref 12–45)
BASOPHILS # BLD AUTO: 0.2 % (ref 0–1.8)
BASOPHILS # BLD: 0.03 K/UL (ref 0–0.12)
BILIRUB SERPL-MCNC: 0.6 MG/DL (ref 0.1–1.5)
BUN SERPL-MCNC: 24 MG/DL (ref 8–22)
CALCIUM SERPL-MCNC: 8.8 MG/DL (ref 8.5–10.5)
CHLORIDE SERPL-SCNC: 100 MMOL/L (ref 96–112)
CO2 SERPL-SCNC: 25 MMOL/L (ref 20–33)
CREAT SERPL-MCNC: 0.96 MG/DL (ref 0.5–1.4)
EOSINOPHIL # BLD AUTO: 0.03 K/UL (ref 0–0.51)
EOSINOPHIL NFR BLD: 0.2 % (ref 0–6.9)
ERYTHROCYTE [DISTWIDTH] IN BLOOD BY AUTOMATED COUNT: 40.7 FL (ref 35.9–50)
GLOBULIN SER CALC-MCNC: 2.5 G/DL (ref 1.9–3.5)
GLUCOSE SERPL-MCNC: 93 MG/DL (ref 65–99)
HCT VFR BLD AUTO: 45.4 % (ref 42–52)
HGB BLD-MCNC: 14.8 G/DL (ref 14–18)
IL6 SERPL-MCNC: <2 PG/ML
IMM GRANULOCYTES # BLD AUTO: 0.28 K/UL (ref 0–0.11)
IMM GRANULOCYTES NFR BLD AUTO: 1.9 % (ref 0–0.9)
LYMPHOCYTES # BLD AUTO: 2.3 K/UL (ref 1–4.8)
LYMPHOCYTES NFR BLD: 15.9 % (ref 22–41)
MCH RBC QN AUTO: 27.1 PG (ref 27–33)
MCHC RBC AUTO-ENTMCNC: 32.6 G/DL (ref 33.7–35.3)
MCV RBC AUTO: 83 FL (ref 81.4–97.8)
MONOCYTES # BLD AUTO: 1.12 K/UL (ref 0–0.85)
MONOCYTES NFR BLD AUTO: 7.7 % (ref 0–13.4)
NEUTROPHILS # BLD AUTO: 10.74 K/UL (ref 1.82–7.42)
NEUTROPHILS NFR BLD: 74.1 % (ref 44–72)
NRBC # BLD AUTO: 0 K/UL
NRBC BLD-RTO: 0 /100 WBC
PLATELET # BLD AUTO: 360 K/UL (ref 164–446)
PMV BLD AUTO: 9 FL (ref 9–12.9)
POTASSIUM SERPL-SCNC: 4.6 MMOL/L (ref 3.6–5.5)
PROT SERPL-MCNC: 6 G/DL (ref 6–8.2)
RBC # BLD AUTO: 5.47 M/UL (ref 4.7–6.1)
SODIUM SERPL-SCNC: 138 MMOL/L (ref 135–145)
WBC # BLD AUTO: 14.5 K/UL (ref 4.8–10.8)

## 2020-07-01 PROCEDURE — 94760 N-INVAS EAR/PLS OXIMETRY 1: CPT

## 2020-07-01 PROCEDURE — A9270 NON-COVERED ITEM OR SERVICE: HCPCS | Performed by: HOSPITALIST

## 2020-07-01 PROCEDURE — 700102 HCHG RX REV CODE 250 W/ 637 OVERRIDE(OP): Performed by: HOSPITALIST

## 2020-07-01 PROCEDURE — 700102 HCHG RX REV CODE 250 W/ 637 OVERRIDE(OP): Performed by: INTERNAL MEDICINE

## 2020-07-01 PROCEDURE — 700111 HCHG RX REV CODE 636 W/ 250 OVERRIDE (IP): Performed by: INTERNAL MEDICINE

## 2020-07-01 PROCEDURE — 85025 COMPLETE CBC W/AUTO DIFF WBC: CPT

## 2020-07-01 PROCEDURE — A9270 NON-COVERED ITEM OR SERVICE: HCPCS | Performed by: INTERNAL MEDICINE

## 2020-07-01 PROCEDURE — 99239 HOSP IP/OBS DSCHRG MGMT >30: CPT | Mod: CS | Performed by: INTERNAL MEDICINE

## 2020-07-01 PROCEDURE — 80053 COMPREHEN METABOLIC PANEL: CPT

## 2020-07-01 PROCEDURE — 36415 COLL VENOUS BLD VENIPUNCTURE: CPT

## 2020-07-01 RX ORDER — DEXAMETHASONE 6 MG/1
0.5 TABLET ORAL DAILY
Qty: 1 TAB | Refills: 0 | Status: SHIPPED | OUTPATIENT
Start: 2020-07-01 | End: 2020-07-03

## 2020-07-01 RX ADMIN — DEXAMETHASONE 6 MG: 6 TABLET ORAL at 05:21

## 2020-07-01 RX ADMIN — METOPROLOL TARTRATE 25 MG: 25 TABLET, FILM COATED ORAL at 05:21

## 2020-07-01 RX ADMIN — ENOXAPARIN SODIUM 120 MG: 120 INJECTION SUBCUTANEOUS at 05:21

## 2020-07-01 NOTE — DISCHARGE PLANNING
Xarelto prior authorization completed.       Jim Neves Key: [id number] - PA Case ID: [id number]   Outcome  Approved today  CaseId:[id number];Status:Approved;Review Type:Prior Auth;Coverage Start Date:06/01/2020;Coverage End Date:12/28/2020;  Drug  Xarelto 10MG tablets  Form  [employer] Electronic PA Form

## 2020-07-01 NOTE — PROGRESS NOTES
IV removed. Discharge paperwork discussed. All questions answered. Follow up appointment discussed. Patient discharged home via car with wife. Patient has all personal belongings.

## 2020-07-01 NOTE — DISCHARGE SUMMARY
Discharge Summary    CHIEF COMPLAINT ON ADMISSION  Chief Complaint   Patient presents with   • Shortness of Breath   • Fever     PT reported that after returning from trip to [place] that he has had a fever and SOB without relief from OTC meds.  PT also with Covid test done on 6/15       Reason for Admission  Acute hypoxemic respiratory failur*     Admission Date  6/20/2020    CODE STATUS  Full Code    HPI & HOSPITAL COURSE  This is a 49 y.o. male here with PMHx HTN presenting with cough and SOB.  Outpt COVID neg on 6/15.  He does however have known + exposure to family members.  Sx's developed and worsened over 5 days. In ED temp 102 and 87% on RA.   CXR showing B patchy infiltrates.  Admitted on C3/Zithro for community acquired pneumonia and acute hypoxic resp failure.      COVID testing as well as inflammatory markers were consistent with COVID infection.  He did receive a course of antibiotics.  He was seen by ID and was initiated on plasma on June 21 as well as June 24.  He has also completed a course of RVD.  As well as paranoid.  He continued to have shortness of breath, however denies he continued on treatment, respiratory symptoms have resolved.  On discharge, he is on room air oxygenation.  He has mild leukocytosis due to steroid administration.  Inflammatory markers have significantly improved.    Due to elevated d-dimer, he was started on full dose anticoagulation and will need to discharge home on Xarelto.    At this time symptoms have significantly improved.      Therefore, he is discharged in good and stable condition to home with close outpatient follow-up.    The patient met 2-midnight criteria for an inpatient stay at the time of discharge.    Discharge Date  7/1    FOLLOW UP ITEMS POST DISCHARGE  F/u with pcp/dc clinic in 1 week  xarelto x 30 days  Decadron x 2 days    DISCHARGE DIAGNOSES  Principal Problem:    Pneumonia due to COVID-19 virus POA: Yes  Active Problems:    HTN  (hypertension), benign POA: Yes    BMI 40.0-44.9, adult (HCC) POA: Yes    Acute hypoxemic respiratory failure (HCC) POA: Unknown    Hyponatremia POA: Unknown    Abnormal LFTs POA: Unknown    Reactive airway disease with acute exacerbation POA: Unknown  Resolved Problems:    * No resolved hospital problems. *      FOLLOW UP  No future appointments.  LC Miller  [facility] [address] [place] [id number] [phone]    Schedule an appointment as soon as possible for a visit in 1 week  Please call to schedule your hospital follow up. Thank you       MEDICATIONS ON DISCHARGE     Medication List      START taking these medications      Instructions   dexamethasone 6 MG Tabs  Commonly known as:  DECADRON   Take 0.5 Tabs by mouth every day for 2 days.  Dose:  3 mg     rivaroxaban 10 MG Tabs tablet  Commonly known as:  Xarelto   Take 1 Tab by mouth with dinner.  Dose:  10 mg        CONTINUE taking these medications      Instructions   albuterol 108 (90 Base) MCG/ACT Aers inhalation aerosol   Inhale 2 Puffs by mouth every 6 hours as needed.  Dose:  2 Puff     amLODIPine 10 MG Tabs  Commonly known as:  NORVASC   TAKE 1 TABLET DAILY     Cetirizine HCl 10 MG Caps   Take 10 mg by mouth every day.  Dose:  10 mg     fluticasone 50 MCG/ACT nasal spray  Commonly known as:  FLONASE   Spray 1 Spray in nose.  Dose:  1 Spray     losartan 50 MG Tabs  Commonly known as:  COZAAR   TAKE 1 TABLET DAILY     metoprolol 25 MG Tabs  Commonly known as:  LOPRESSOR   TAKE 1 TABLET BY MOUTH TWICE DAILY        STOP taking these medications    allopurinol 100 MG Tabs  Commonly known as:  ZYLOPRIM            Allergies  No Known Allergies    DIET  Orders Placed This Encounter   Procedures   • Diet Order Regular     Standing Status:   Standing     Number of Occurrences:   1     Order Specific Question:   Diet:     Answer:   Regular [1]       ACTIVITY  As tolerated.  Weight bearing as  tolerated    CONSULTATIONS  ID  Pulmonary    PROCEDURES  None    LABORATORY  Lab Results   Component Value Date    SODIUM 138 07/01/2020    POTASSIUM 4.6 07/01/2020    CHLORIDE 100 07/01/2020    CO2 25 07/01/2020    GLUCOSE 93 07/01/2020    BUN 24 (H) 07/01/2020    CREATININE 0.96 07/01/2020        Lab Results   Component Value Date    WBC 14.5 (H) 07/01/2020    HEMOGLOBIN 14.8 07/01/2020    HEMATOCRIT 45.4 07/01/2020    PLATELETCT 360 07/01/2020        Total time of the discharge process exceeds 45 minutes.

## 2020-07-01 NOTE — DISCHARGE INSTRUCTIONS
Discharge Instructions    Discharged to home by car with relative. Discharged via wheelchair, hospital escort: Yes.  Special equipment needed: Not Applicable    Be sure to schedule a follow-up appointment with your primary care doctor or any specialists as instructed.     Discharge Plan:        I understand that a diet low in cholesterol, fat, and sodium is recommended for good health. Unless I have been given specific instructions below for another diet, I accept this instruction as my diet prescription.   Other diet: Regular    Special Instructions: None    · Is patient discharged on Warfarin / Coumadin?   No     Depression / Suicide Risk    As you are discharged from this [facility] facility, it is important to learn how to keep safe from harming yourself.    Recognize the warning signs:  · Abrupt changes in personality, positive or negative- including increase in energy   · Giving away possessions  · Change in eating patterns- significant weight changes-  positive or negative  · Change in sleeping patterns- unable to sleep or sleeping all the time   · Unwillingness or inability to communicate  · Depression  · Unusual sadness, discouragement and loneliness  · Talk of wanting to die  · Neglect of personal appearance   · Rebelliousness- reckless behavior  · Withdrawal from people/activities they love  · Confusion- inability to concentrate     If you or a loved one observes any of these behaviors or has concerns about self-harm, here's what you can do:  · Talk about it- your feelings and reasons for harming yourself  · Remove any means that you might use to hurt yourself (examples: pills, rope, extension cords, firearm)  · Get professional help from the community (Mental Health, Substance Abuse, psychological counseling)  · Do not be alone:Call your Safe Contact- someone whom you trust who will be there for you.  · Call your local CRISIS HOTLINE 680-6855 or [phone]  · Call your local Children's Mobile Crisis  Response Team [facility] [phone] 185-0584 or www.Affinity.is  · Call the toll free National Suicide Prevention Hotlines   · National Suicide Prevention Lifeline 285-114-RTLK (1046)  · National Hope Line Network 800-SUICIDE (644-7047)      INSTRUCTIONS FOR COVID-19 OR ANY OTHER INFECTIOUS RESPIRATORY ILLNESSES    The Centers for Disease Control and Prevention (CDC) states that early indications for COVID-19 include cough, shortness of breath, difficulty breathing, or at least two of the following symptoms: chills, shaking with chills, muscle pain, headache, sore throat, and loss of taste or smell. Symptoms can range from mild to severe and may appear up to two weeks after exposure to the virus.    The practice of self-isolation and quarantine helps protect the public and your family by  preventing exposure to people who have or may have a contagious disease. Please follow the prevention steps below as based on CDC guidelines:    WHEN TO STOP ISOLATION: Persons with COVID-19 or any other infectious respiratory illness who have symptoms and were advised to care for themselves at home may discontinue home isolation under the following conditions:  · At least 3 days (72 hours) have passed since recovery defined as resolution of fever without the use of fever-reducing medications; AND,  · Improvement in respiratory symptoms (e.g., cough, shortness of breath); AND,  · At least 10 days have passed since symptoms first appeared and have had no subsequent illness.    MONITOR YOUR SYMPTOMS: If your illness is worsening, seek prompt medical attention. If you have a medical emergency and need to call 911, notify the dispatch personnel that you have, or are being evaluated for confirmed or suspected COVID-19 or another infectious respiratory illness. Wear a facemask if possible.    ACTIVITY RESTRICTION: restrict activities outside your home, except for getting medical care. Do not go to work, school, or public areas. Avoid  using public transportation, ride-sharing, or taxis.    SCHEDULED MEDICAL APPOINTMENTS: Notify your provider that you have, or are being evaluated for, confirmed or suspected COVID-19 or another infectious respiratory. This will help the healthcare provider’s office safely take care of you and keep other people from getting exposed or infected.    FACEMASKS, when to wear: Anytime you are away from your home or around other people or pets. If you are unable to wear one, maintain a minimum of 6 feet distancing from others.    LIVING ENVIRONMENT: Stay in a separate room from other people and pets. If possible, use a separate bathroom, have someone else care for your pets and avoid sharing household items. Any items used should be washed thoroughly with soap and water. Clean all “high-touch” surfaces every day. Use a household cleaning spray or wipe, according to the label instructions. High touch surfaces include (but are not limited to) counters, tabletops, doorknobs, bathroom fixtures, toilets, phones, keyboards, tablets, and bedside tables.     HAND WASHING: Frequently wash hands with soap and water for at least 20 seconds,  especially after blowing your nose, coughing, or sneezing; going to the bathroom; before and after interacting with pets; and before and after eating or preparing food. If hands are visibly dirty use soap and water. If soap and water are not available, use an alcohol-based hand  with at least 60% alcohol. Avoid touching your eyes, nose, and mouth with unwashed hands. Cover your coughs and sneezes with a tissue. Throw used tissues in a lined trash can. Immediately wash your hands.    ACTIVE/FACILITATED SELF-MONITORING: Follow instructions provided by your local health department or health professionals, as appropriate. When working with your local health department check their available hours.    [facility]   Phone Number   [place] [phone]   [place] [phone]   [place] [address]   [place] [phone]     IF YOU HAVE CONFIRMED POSITIVE COVID-19:    Those who have completely recovered from COVID-19 may have immune-boosting antibodies in their plasma--called “convalescent plasma”--that could be used to treat critically ill COVID19 patients.    [facility] is excited to begin working with [facility] on collecting convalescent plasma from  people who have recovered from COVID-19 as part of a program to treat patients infected with the virus. This FDA-approved “emergency investigational new drug” is a special blood product containing antibodies that may give patients an extra boost to fight the virus.    To be eligible to donate convalescent plasma, you must have a prior COVID-19 diagnosis documented by a laboratory test (or a positive test result for SARS-CoV-2 antibodies) and meet additional eligibility requirements.    If you are interested in donating convalescent plasma or have any additional questions, please contact the [facility] Convalescent Plasma  at [phone] or via e-mail at [email or web address].        COVID-19  COVID-19 is a respiratory infection that is caused by a virus called severe acute respiratory syndrome coronavirus 2 (SARS-CoV-2). The disease is also known as coronavirus disease or novel coronavirus. In some people, the virus may not cause any symptoms. In others, it may cause a serious infection. The infection can get worse quickly and can lead to complications, such as:  · Pneumonia, or infection of the lungs.  · Acute respiratory distress syndrome or ARDS. This is fluid build-up in the lungs.  · Acute respiratory failure. This is a condition in which there is not enough oxygen passing from the lungs to the body.  · Sepsis or septic shock. This is a serious bodily reaction to an infection.  · Blood clotting problems.  · Secondary infections due to bacteria or fungus.  The virus that causes COVID-19 is contagious.  This means that it can spread from person to person through droplets from coughs and sneezes (respiratory secretions).  What are the causes?  This illness is caused by a virus. You may catch the virus by:  · Breathing in droplets from an infected person's cough or sneeze.  · Touching something, like a table or a doorknob, that was exposed to the virus (contaminated) and then touching your mouth, nose, or eyes.  What increases the risk?  Risk for infection  You are more likely to be infected with this virus if you:  · Live in or travel to an area with a COVID-19 outbreak.  · Come in contact with a sick person who recently traveled to an area with a COVID-19 outbreak.  · Provide care for or live with a person who is infected with COVID-19.  Risk for serious illness  You are more likely to become seriously ill from the virus if you:  · Are 65 years of age or older.  · Have a long-term disease that lowers your body's ability to fight infection (immunocompromised).  · Live in a nursing home or long-term care facility.  · Have a long-term (chronic) disease such as:  ? Chronic lung disease, including chronic obstructive pulmonary disease or asthma  ? Heart disease.  ? Diabetes.  ? Chronic kidney disease.  ? Liver disease.  · Are obese.  What are the signs or symptoms?  Symptoms of this condition can range from mild to severe. Symptoms may appear any time from 2 to 14 days after being exposed to the virus. They include:  · A fever.  · A cough.  · Difficulty breathing.  · Chills.  · Muscle pains.  · A sore throat.  · Loss of taste or smell.  Some people may also have stomach problems, such as nausea, vomiting, or diarrhea.  Other people may not have any symptoms of COVID-19.  How is this diagnosed?  This condition may be diagnosed based on:  · Your signs and symptoms, especially if:  ? You live in an area with a COVID-19 outbreak.  ? You recently traveled to or from an area where the virus is common.  ? You provide care for  or live with a person who was diagnosed with COVID-19.  · A physical exam.  · Lab tests, which may include:  ? A nasal swab to take a sample of fluid from your nose.  ? A throat swab to take a sample of fluid from your throat.  ? A sample of mucus from your lungs (sputum).  ? Blood tests.  · Imaging tests, which may include, X-rays, CT scan, or ultrasound.  How is this treated?  At present, there is no medicine to treat COVID-19. Medicines that treat other diseases are being used on a trial basis to see if they are effective against COVID-19.  Your health care provider will talk with you about ways to treat your symptoms. For most people, the infection is mild and can be managed at home with rest, fluids, and over-the-counter medicines.  Treatment for a serious infection usually takes places in a hospital intensive care unit (ICU). It may include one or more of the following treatments. These treatments are given until your symptoms improve.  · Receiving fluids and medicines through an IV.  · Supplemental oxygen. Extra oxygen is given through a tube in the nose, a face mask, or a oh.  · Positioning you to lie on your stomach (prone position). This makes it easier for oxygen to get into the lungs.  · Continuous positive airway pressure (CPAP) or bi-level positive airway pressure (BPAP) machine. This treatment uses mild air pressure to keep the airways open. A tube that is connected to a motor delivers oxygen to the body.  · Ventilator. This treatment moves air into and out of the lungs by using a tube that is placed in your windpipe.  · Tracheostomy. This is a procedure to create a hole in the neck so that a breathing tube can be inserted.  · Extracorporeal membrane oxygenation (ECMO). This procedure gives the lungs a chance to recover by taking over the functions of the heart and lungs. It supplies oxygen to the body and removes carbon dioxide.  Follow these instructions at home:  Lifestyle  · If you are sick,  stay home except to get medical care. Your health care provider will tell you how long to stay home. Call your health care provider before you go for medical care.  · Rest at home as told by your health care provider.  · Do not use any products that contain nicotine or tobacco, such as cigarettes, e-cigarettes, and chewing tobacco. If you need help quitting, ask your health care provider.  · Return to your normal activities as told by your health care provider. Ask your health care provider what activities are safe for you.  General instructions  · Take over-the-counter and prescription medicines only as told by your health care provider.  · Drink enough fluid to keep your urine pale yellow.  · Keep all follow-up visits as told by your health care provider. This is important.  How is this prevented?    There is no vaccine to help prevent COVID-19 infection. However, there are steps you can take to protect yourself and others from this virus.  To protect yourself:   · Do not travel to areas where COVID-19 is a risk. The areas where COVID-19 is reported change often. To identify high-risk areas and travel restrictions, check the CDC travel website: wwwnc.cdc.gov/travel/notices  · If you live in, or must travel to, an area where COVID-19 is a risk, take precautions to avoid infection.  ? Stay away from people who are sick.  ? Wash your hands often with soap and water for 20 seconds. If soap and water are not available, use an alcohol-based hand .  ? Avoid touching your mouth, face, eyes, or nose.  ? Avoid going out in public, follow guidance from your state and local health authorities.  ? If you must go out in public, wear a cloth face covering or face mask.  ? Disinfect objects and surfaces that are frequently touched every day. This may include:  § Counters and tables.  § Doorknobs and light switches.  § Sinks and faucets.  § Electronics, such as phones, remote controls, keyboards, computers, and  tablets.  To protect others:  If you have symptoms of COVID-19, take steps to prevent the virus from spreading to others.  · If you think you have a COVID-19 infection, contact your health care provider right away. Tell your health care team that you think you may have a COVID-19 infection.  · Stay home. Leave your house only to seek medical care. Do not use public transport.  · Do not travel while you are sick.  · Wash your hands often with soap and water for 20 seconds. If soap and water are not available, use alcohol-based hand .  · Stay away from other members of your household. Let healthy household members care for children and pets, if possible. If you have to care for children or pets, wash your hands often and wear a mask. If possible, stay in your own room, separate from others. Use a different bathroom.  · Make sure that all people in your household wash their hands well and often.  · Cough or sneeze into a tissue or your sleeve or elbow. Do not cough or sneeze into your hand or into the air.  · Wear a cloth face covering or face mask.  Where to find more information  · Centers for Disease Control and Prevention: www.cdc.gov/coronavirus/2019-ncov/index.html  · World Health Organization: www.who.int/health-topics/coronavirus  Contact a health care provider if:  · You live in or have traveled to an area where COVID-19 is a risk and you have symptoms of the infection.  · You have had contact with someone who has COVID-19 and you have symptoms of the infection.  Get help right away if:  · You have trouble breathing.  · You have pain or pressure in your chest.  · You have confusion.  · You have bluish lips and fingernails.  · You have difficulty waking from sleep.  · You have symptoms that get worse.  These symptoms may represent a serious problem that is an emergency. Do not wait to see if the symptoms will go away. Get medical help right away. Call your local emergency services (911 in the [place] Do  not drive yourself to the hospital. Let the emergency medical personnel know if you think you have COVID-19.  Summary  · COVID-19 is a respiratory infection that is caused by a virus. It is also known as coronavirus disease or novel coronavirus. It can cause serious infections, such as pneumonia, acute respiratory distress syndrome, acute respiratory failure, or sepsis.  · The virus that causes COVID-19 is contagious. This means that it can spread from person to person through droplets from coughs and sneezes.  · You are more likely to develop a serious illness if you are 65 years of age or older, have a weak immunity, live in a nursing home, or have chronic disease.  · There is no medicine to treat COVID-19. Your health care provider will talk with you about ways to treat your symptoms.  · Take steps to protect yourself and others from infection. Wash your hands often and disinfect objects and surfaces that are frequently touched every day. Stay away from people who are sick and wear a mask if you are sick.  This information is not intended to replace advice given to you by your health care provider. Make sure you discuss any questions you have with your health care provider.  Document Released: 01/23/2020 Document Revised: 05/14/2020 Document Reviewed: 01/23/2020  [employer] Patient Education © 2020 [employer]        Discharge Instructions per Eryn Hull D.O.    F/u with pcp/dc clinic in 1 week  xarelto x 30 days  Decadron x 2 days    DIET: regular    ACTIVITY: as tolerated    DIAGNOSIS: Covid pneumonia    Return to ER if recurrent fever or Shortness of breath

## 2020-07-01 NOTE — CARE PLAN
Problem: Communication  Goal: The ability to communicate needs accurately and effectively will improve  Outcome: PROGRESSING AS EXPECTED     Problem: Venous Thromboembolism (VTW)/Deep Vein Thrombosis (DVT) Prevention:  Goal: Patient will participate in Venous Thrombosis (VTE)/Deep Vein Thrombosis (DVT)Prevention Measures  Outcome: PROGRESSING AS EXPECTED     Problem: Respiratory:  Goal: Respiratory status will improve  Outcome: PROGRESSING AS EXPECTED      Patient ambulates frequently throughout the shift. Patient is on room air and respiratory status has improved since arrival. Patient uses the call light appropriately and makes sure it is within reach. Patient will continue to be encouraged to ambulate and call if assistance is needed.

## 2020-07-01 NOTE — CARE PLAN
Problem: Venous Thromboembolism (VTW)/Deep Vein Thrombosis (DVT) Prevention:  Goal: Patient will participate in Venous Thrombosis (VTE)/Deep Vein Thrombosis (DVT)Prevention Measures  Outcome: PROGRESSING AS EXPECTED  Note: SCDs ordered but not on due to pt. Frequently ambulating. On scheduled lovenox, 120mg. Pulses palpable in all extremities, cap refill brisk.      Problem: Respiratory:  Goal: Respiratory status will improve  Outcome: PROGRESSING AS EXPECTED  Note: Pt. On RA, prones self 16h/day per MD. Uses IS independently with strong, motivated effort.

## 2020-07-07 ENCOUNTER — TELEMEDICINE (OUTPATIENT)
Dept: MEDICAL GROUP | Facility: MEDICAL CENTER | Age: 50
End: 2020-07-07
Payer: COMMERCIAL

## 2020-07-07 VITALS
HEART RATE: 60 BPM | BODY MASS INDEX: 41.3 KG/M2 | HEIGHT: 66 IN | WEIGHT: 257 LBS | DIASTOLIC BLOOD PRESSURE: 83 MMHG | OXYGEN SATURATION: 94 % | SYSTOLIC BLOOD PRESSURE: 100 MMHG

## 2020-07-07 DIAGNOSIS — R79.89 ELEVATED LFTS: ICD-10-CM

## 2020-07-07 DIAGNOSIS — I10 HTN (HYPERTENSION), BENIGN: ICD-10-CM

## 2020-07-07 DIAGNOSIS — J12.82 PNEUMONIA DUE TO COVID-19 VIRUS: ICD-10-CM

## 2020-07-07 DIAGNOSIS — U07.1 PNEUMONIA DUE TO COVID-19 VIRUS: ICD-10-CM

## 2020-07-07 DIAGNOSIS — R20.2 PARESTHESIA: ICD-10-CM

## 2020-07-07 PROBLEM — J96.01 ACUTE HYPOXEMIC RESPIRATORY FAILURE (HCC): Status: RESOLVED | Noted: 2020-06-21 | Resolved: 2020-07-07

## 2020-07-07 PROBLEM — E87.1 HYPONATREMIA: Status: RESOLVED | Noted: 2020-06-21 | Resolved: 2020-07-07

## 2020-07-07 PROBLEM — J45.901 REACTIVE AIRWAY DISEASE WITH ACUTE EXACERBATION: Status: RESOLVED | Noted: 2020-06-21 | Resolved: 2020-07-07

## 2020-07-07 PROCEDURE — 99214 OFFICE O/P EST MOD 30 MIN: CPT | Mod: 95,CS | Performed by: NURSE PRACTITIONER

## 2020-07-07 ASSESSMENT — FIBROSIS 4 INDEX: FIB4 SCORE: 0.45

## 2020-07-07 NOTE — PROGRESS NOTES
"This encounter was conducted via Zoom meeting  Verbal consent was obtained. Patient's identity was verified.       CC:***                                                                                                                                      HPI:   Jim presents today with the following.    1. Pneumonia due to COVID-19 virus  ***    2. Elevated LFTs  ***      Patient Active Problem List    Diagnosis Date Noted   • Pneumonia due to COVID-19 virus 06/21/2020   • Acute hypoxemic respiratory failure (HCC) 06/21/2020   • Hyponatremia 06/21/2020   • Abnormal LFTs 06/21/2020   • Reactive airway disease with acute exacerbation 06/21/2020   • H/O: gout 06/19/2019   • Environmental allergies 12/12/2018   • Morbid obesity with BMI of 40.0-44.9, adult ([facility]) 10/17/2017   • HTN (hypertension), benign 01/21/2016   • BMI 40.0-44.9, adult ([facility]) 01/21/2016       Current Outpatient Medications   Medication Sig Dispense Refill   • rivaroxaban (XARELTO) 10 MG Tab tablet Take 1 Tab by mouth with dinner. 30 Tab 0   • fluticasone (FLONASE) 50 MCG/ACT nasal spray Spray 1 Spray in nose.     • albuterol 108 (90 Base) MCG/ACT Aero Soln inhalation aerosol Inhale 2 Puffs by mouth every 6 hours as needed. 8.5 g 11   • amLODIPine (NORVASC) 10 MG Tab TAKE 1 TABLET DAILY 90 Tab 4   • losartan (COZAAR) 50 MG Tab TAKE 1 TABLET DAILY 90 Tab 4   • metoprolol (LOPRESSOR) 25 MG Tab TAKE 1 TABLET BY MOUTH TWICE DAILY 180 Tab 3   • Cetirizine HCl 10 MG Cap Take 10 mg by mouth every day.       No current facility-administered medications for this visit.          Allergies as of 07/07/2020   • (No Known Allergies)        ROS:  Denies, chest pain, Shortness of breath, Edema.     /83   Pulse 60   Ht 1.676 m (5' 6\")   Wt 116.6 kg (257 lb)   SpO2 94%   BMI 41.48 kg/m²       Physical Exam:  Constitutional: Alert, no distress, well-groomed.  Skin: No rashes in visible areas.  Eye: Round. Conjunctiva clear, lNo icterus.   ENMT: Lips pink " without lesions, good dentition, moist mucous membranes. Phonation normal.  Neck: No masses, no thyromegaly. Moves freely without pain.  CV: Pulse as reported by patient  Respiratory: Unlabored respiratory effort, no cough or audible wheeze  Psych: Alert and oriented x3, normal affect and mood.          Assessment and Plan.   49 y.o. male with the following issues.    1. Pneumonia due to COVID-19 virus  ***  - CBC WITHOUT DIFFERENTIAL; Future    2. Elevated LFTs  ***  - HEPATITIS PANEL ACUTE (4 COMPONENTS); Future  - HEPATIC FUNCTION PANEL; Future

## 2020-07-07 NOTE — PROGRESS NOTES
This encounter was conducted via Zoom meeting  Verbal consent was obtained. Patient's identity was verified.  Patient unable to come to the office due to COVID-19.      CC: Patient telemedicine visit for pneumonia due to COVID-19 follow-up.                                                                                                                                      HPI:   Jim presents today with the following.    1. Pneumonia due to COVID-19 virus  Patient recently traveled to [place] for his father's 1 year memorial after his death.  He was around many people and a few ended up positive for COVID-19.  He had developed shortness of breath for about 5 days upon return and was running a fever of 102 and subsequently went to the emergency room on June 20.  His pulse ox was running around 87% and his COVID-19 testing came back positive.    Patient was admitted to the hospital and given Rendesivir, convalescent plasma, dexamethasone and Lovenox.  He was also treated with antibiotics empirically.  He responded to treatment and was discharged stable on July 1.  He has been self isolating at home and states he has improved especially over the last 24 hours.  He still has some shortness of breath with exertion but denies chest pain, fever, chills, shortness of breath at rest and is taking his Xarelto for the next month for anticoagulation.  He is going to need [facility] paperwork.  Albuterol has helped and he would like to get a spacer for this since it worked well for him in the hospital.    2. Elevated LFTs  Patient's lab work from 2019 showed normal liver functions but his discharge liver functions were mildly elevated with AST of 49 and ALT of 198.  He denies abdominal pain, nausea or vomiting.    3. Paresthesia  Patient states while in the hospital he developed numbness of his left ring and pinky finger.  He states it still persists.  There is no loss of mobility, swelling, redness or tenderness.  He states  "he is able to feel his radial pulse.  He denies neck pain or numbness or pain of his left upper or lower arm.  He states he was attached to a number of devices including pulse ox throughout his stay.    4. HTN (hypertension), benign  He states his blood pressure is doing well on his losartan and metoprolol.      Patient Active Problem List    Diagnosis Date Noted   • Pneumonia due to COVID-19 virus 06/21/2020   • H/O: gout 06/19/2019   • Environmental allergies 12/12/2018   • Morbid obesity with BMI of 40.0-44.9, adult (HCC) 10/17/2017   • HTN (hypertension), benign 01/21/2016       Current Outpatient Medications   Medication Sig Dispense Refill   • Misc. Devices Misc Spacer for albuterol 1 Each 1   • rivaroxaban (XARELTO) 10 MG Tab tablet Take 1 Tab by mouth with dinner. 30 Tab 0   • fluticasone (FLONASE) 50 MCG/ACT nasal spray Spray 1 Spray in nose.     • albuterol 108 (90 Base) MCG/ACT Aero Soln inhalation aerosol Inhale 2 Puffs by mouth every 6 hours as needed. 8.5 g 11   • amLODIPine (NORVASC) 10 MG Tab TAKE 1 TABLET DAILY 90 Tab 4   • losartan (COZAAR) 50 MG Tab TAKE 1 TABLET DAILY 90 Tab 4   • metoprolol (LOPRESSOR) 25 MG Tab TAKE 1 TABLET BY MOUTH TWICE DAILY 180 Tab 3   • Cetirizine HCl 10 MG Cap Take 10 mg by mouth every day.       No current facility-administered medications for this visit.          Allergies as of 07/07/2020   • (No Known Allergies)        ROS:  Denies, chest pain, fever, chills, shortness of breath at rest, nausea or vomiting.  Positive for slight shortness of breath with exertion as well as paresthesias of the left fourth and fifth fingers without swelling or redness.    /83   Pulse 60   Ht 1.676 m (5' 6\")   Wt 116.6 kg (257 lb)   SpO2 94%   BMI 41.48 kg/m²       Physical Exam:  Constitutional: Alert, no distress, well-groomed.  Skin: No rashes in visible areas.  Eye: Round. Conjunctiva clear, lNo icterus.   ENMT: Lips pink without lesions, good dentition, moist mucous " membranes. Phonation normal.  Neck: No masses, no thyromegaly. Moves freely without pain.  CV: Pulse as reported by patient  Respiratory: Unlabored respiratory effort, no cough or audible wheeze  Psych: Alert and oriented x3, normal affect and mood.    Neuro: No swelling, redness or deformity of the left hand seen.  He has full range of motion of the fingers of the left hand and he states he is able to feel a radial pulse.  There is no erythema present.      Assessment and Plan.   49 y.o. male with the following issues.    1. Pneumonia due to COVID-19 virus  Patient appears to be doing well post hospitalization for COVID-19 pneumonia for which he was treated with convalescent plasma, Lovenox, dexamethasone, oxygen and Rendisivir.  I will provide him with a spacer for his albuterol to use as needed.  He will only be on the Xarelto for 30 days with no mention of DVT or PE in the hospital and last BNP normal and d-dimer normal.  I will have him do lab work in the next 10 days.  I will fill out [facility] paperwork to start at his hospitalization date on June 20 and I explained he is to not go back to work for 2 weeks from discharge and at least 10 days without cough or shortness of breath which would be July 21.  - CBC WITHOUT DIFFERENTIAL; Future  - Misc. Devices Misc; Spacer for albuterol  Dispense: 1 Each; Refill: 1    2. Elevated LFTs  I will check elevated liver functions to see if they are still elevated now that he is off his multiple medications which may have affected liver functions.  - HEPATITIS PANEL ACUTE (4 COMPONENTS); Future  - HEPATIC FUNCTION PANEL; Future    3. Paresthesia  Possibly secondary to pressure on the nerves in the area while in the hospital but I explained if it does not improve in the next 2 weeks, we will need to do further work-up.  He appears to have good radial pulse and no evidence of cellulitis, swelling or decreased mobility or pain.  He is on anticoagulation.    4. HTN (hypertension),  benign  Blood pressure appears well-controlled today and he will continue on same medicines.

## 2020-07-14 ENCOUNTER — HOSPITAL ENCOUNTER (OUTPATIENT)
Dept: LAB | Facility: MEDICAL CENTER | Age: 50
End: 2020-07-14
Attending: NURSE PRACTITIONER
Payer: COMMERCIAL

## 2020-07-14 DIAGNOSIS — R79.89 ELEVATED LFTS: ICD-10-CM

## 2020-07-14 DIAGNOSIS — J12.82 PNEUMONIA DUE TO COVID-19 VIRUS: ICD-10-CM

## 2020-07-14 DIAGNOSIS — U07.1 PNEUMONIA DUE TO COVID-19 VIRUS: ICD-10-CM

## 2020-07-14 LAB
ALBUMIN SERPL BCP-MCNC: 3.9 G/DL (ref 3.2–4.9)
ALP SERPL-CCNC: 54 U/L (ref 30–99)
ALT SERPL-CCNC: 104 U/L (ref 2–50)
AST SERPL-CCNC: 39 U/L (ref 12–45)
BILIRUB CONJ SERPL-MCNC: <0.2 MG/DL (ref 0.1–0.5)
BILIRUB INDIRECT SERPL-MCNC: ABNORMAL MG/DL (ref 0–1)
BILIRUB SERPL-MCNC: 0.6 MG/DL (ref 0.1–1.5)
ERYTHROCYTE [DISTWIDTH] IN BLOOD BY AUTOMATED COUNT: 42 FL (ref 35.9–50)
HAV IGM SERPL QL IA: NORMAL
HBV CORE IGM SER QL: NORMAL
HBV SURFACE AG SER QL: NORMAL
HCT VFR BLD AUTO: 41.3 % (ref 42–52)
HCV AB SER QL: NORMAL
HGB BLD-MCNC: 13.3 G/DL (ref 14–18)
MCH RBC QN AUTO: 26.6 PG (ref 27–33)
MCHC RBC AUTO-ENTMCNC: 32.2 G/DL (ref 33.7–35.3)
MCV RBC AUTO: 82.6 FL (ref 81.4–97.8)
PLATELET # BLD AUTO: 175 K/UL (ref 164–446)
PMV BLD AUTO: 9.3 FL (ref 9–12.9)
PROT SERPL-MCNC: 6.8 G/DL (ref 6–8.2)
RBC # BLD AUTO: 5 M/UL (ref 4.7–6.1)
WBC # BLD AUTO: 8.8 K/UL (ref 4.8–10.8)

## 2020-07-14 PROCEDURE — 80074 ACUTE HEPATITIS PANEL: CPT

## 2020-07-14 PROCEDURE — 85027 COMPLETE CBC AUTOMATED: CPT

## 2020-07-14 PROCEDURE — 80076 HEPATIC FUNCTION PANEL: CPT

## 2020-07-14 PROCEDURE — 36415 COLL VENOUS BLD VENIPUNCTURE: CPT

## 2020-10-23 DIAGNOSIS — I10 HTN (HYPERTENSION), BENIGN: ICD-10-CM

## 2020-10-23 DIAGNOSIS — R79.89 ELEVATED LFTS: ICD-10-CM

## 2020-11-07 ENCOUNTER — HOSPITAL ENCOUNTER (OUTPATIENT)
Dept: LAB | Facility: MEDICAL CENTER | Age: 50
End: 2020-11-07
Attending: NURSE PRACTITIONER
Payer: COMMERCIAL

## 2020-11-07 DIAGNOSIS — Z00.00 ROUTINE GENERAL MEDICAL EXAMINATION AT A HEALTH CARE FACILITY: ICD-10-CM

## 2020-11-07 DIAGNOSIS — Z87.39 H/O: GOUT: ICD-10-CM

## 2020-11-07 DIAGNOSIS — I10 HTN (HYPERTENSION), BENIGN: ICD-10-CM

## 2020-11-07 LAB
ALBUMIN SERPL BCP-MCNC: 4.3 G/DL (ref 3.2–4.9)
ALBUMIN/GLOB SERPL: 1.7 G/DL
ALP SERPL-CCNC: 57 U/L (ref 30–99)
ALT SERPL-CCNC: 47 U/L (ref 2–50)
ANION GAP SERPL CALC-SCNC: 9 MMOL/L (ref 7–16)
AST SERPL-CCNC: 29 U/L (ref 12–45)
BILIRUB SERPL-MCNC: 0.3 MG/DL (ref 0.1–1.5)
BUN SERPL-MCNC: 13 MG/DL (ref 8–22)
CALCIUM SERPL-MCNC: 9.2 MG/DL (ref 8.5–10.5)
CHLORIDE SERPL-SCNC: 102 MMOL/L (ref 96–112)
CHOLEST SERPL-MCNC: 144 MG/DL (ref 100–199)
CO2 SERPL-SCNC: 26 MMOL/L (ref 20–33)
CREAT SERPL-MCNC: 0.96 MG/DL (ref 0.5–1.4)
FASTING STATUS PATIENT QL REPORTED: NORMAL
GLOBULIN SER CALC-MCNC: 2.6 G/DL (ref 1.9–3.5)
GLUCOSE SERPL-MCNC: 90 MG/DL (ref 65–99)
HDLC SERPL-MCNC: 33 MG/DL
LDLC SERPL CALC-MCNC: 68 MG/DL
POTASSIUM SERPL-SCNC: 4.2 MMOL/L (ref 3.6–5.5)
PROT SERPL-MCNC: 6.9 G/DL (ref 6–8.2)
SODIUM SERPL-SCNC: 137 MMOL/L (ref 135–145)
TRIGL SERPL-MCNC: 216 MG/DL (ref 0–149)
TSH SERPL DL<=0.005 MIU/L-ACNC: 1.41 UIU/ML (ref 0.38–5.33)
URATE SERPL-MCNC: 7.7 MG/DL (ref 2.5–8.3)

## 2020-11-07 PROCEDURE — 80061 LIPID PANEL: CPT

## 2020-11-07 PROCEDURE — 36415 COLL VENOUS BLD VENIPUNCTURE: CPT

## 2020-11-07 PROCEDURE — 84550 ASSAY OF BLOOD/URIC ACID: CPT

## 2020-11-07 PROCEDURE — 84443 ASSAY THYROID STIM HORMONE: CPT

## 2020-11-07 PROCEDURE — 80053 COMPREHEN METABOLIC PANEL: CPT

## 2020-11-11 ENCOUNTER — TELEMEDICINE (OUTPATIENT)
Dept: MEDICAL GROUP | Facility: MEDICAL CENTER | Age: 50
End: 2020-11-11
Payer: COMMERCIAL

## 2020-11-11 VITALS — HEIGHT: 66 IN | HEART RATE: 88 BPM | BODY MASS INDEX: 43.39 KG/M2 | WEIGHT: 270 LBS | OXYGEN SATURATION: 96 %

## 2020-11-11 DIAGNOSIS — E79.0 HYPERURICEMIA: ICD-10-CM

## 2020-11-11 DIAGNOSIS — U07.1 PNEUMONIA DUE TO COVID-19 VIRUS: ICD-10-CM

## 2020-11-11 DIAGNOSIS — E66.01 MORBID OBESITY WITH BMI OF 40.0-44.9, ADULT (HCC): ICD-10-CM

## 2020-11-11 DIAGNOSIS — I10 HTN (HYPERTENSION), BENIGN: ICD-10-CM

## 2020-11-11 DIAGNOSIS — J12.82 PNEUMONIA DUE TO COVID-19 VIRUS: ICD-10-CM

## 2020-11-11 PROCEDURE — 99213 OFFICE O/P EST LOW 20 MIN: CPT | Mod: 95,CR | Performed by: NURSE PRACTITIONER

## 2020-11-11 RX ORDER — ALLOPURINOL 100 MG/1
200 TABLET ORAL DAILY
Qty: 180 TAB | Refills: 3
Start: 2020-11-11 | End: 2021-04-14 | Stop reason: SDUPTHER

## 2020-11-11 ASSESSMENT — FIBROSIS 4 INDEX: FIB4 SCORE: 1.16

## 2020-11-11 NOTE — PROGRESS NOTES
Virtual Visit: Established Patient   This visit was conducted via Zoom  using secure and encrypted videoconferencing technology. The patient was in a private location in the state of [place].    The patient's identity was confirmed and verbal consent was obtained for this virtual visit.      CC: Patient requesting telemedicine visit today to go over lab results including elevated uric acid levels and follow-up on hypertension and history of Covid pneumonia.                                                                                                                                      HPI:   Jim presents today with the following.    1. Hyperuricemia  Patient has a history of gout and hyperuricemia for which he was on allopurinol 300 mg at 1 time.  It was stopped in July after his hospitalization and with repeat lab work done off medication his uric acid level comes back elevated at 7.7.  He states he has not had a gout attack in 1 year.    2. HTN (hypertension), benign  Patient reports he had his blood pressure checked about a week ago and was running in the 120s over 80s range on his current combination of metoprolol, amlodipine and losartan.  Recent chemistry panel was normal and liver enzymes returned back to normal after having been elevated in July.    3. Pneumonia due to COVID-19 virus  Patient was hospitalized in June with Covid pneumonia.  He states as of now he is doing much better and is not having any respiratory issues.  He was treated with convalescent plasma and RVD.  He completed his anticoagulation..    4. Morbid obesity with BMI of 40.0-44.9, adult (HCC)  Patient continues to be morbidly obese.      Patient Active Problem List    Diagnosis Date Noted   • Hyperuricemia 11/11/2020   • H/O: gout 06/19/2019   • Environmental allergies 12/12/2018   • Morbid obesity with BMI of 40.0-44.9, adult (HCC) 10/17/2017   • HTN (hypertension), benign 01/21/2016       Current Outpatient Medications  "  Medication Sig Dispense Refill   • allopurinol (ZYLOPRIM) 100 MG Tab Take 2 Tabs by mouth every day. 180 Tab 3   • metoprolol (LOPRESSOR) 25 MG Tab TAKE 1 TABLET BY MOUTH TWICE DAILY 180 Tab 3   • fluticasone (FLONASE) 50 MCG/ACT nasal spray Spray 1 Spray in nose.     • amLODIPine (NORVASC) 10 MG Tab TAKE 1 TABLET DAILY 90 Tab 4   • losartan (COZAAR) 50 MG Tab TAKE 1 TABLET DAILY 90 Tab 4   • Cetirizine HCl 10 MG Cap Take 10 mg by mouth every day.       No current facility-administered medications for this visit.          Allergies as of 11/11/2020   • (No Known Allergies)        ROS:  Denies shortness of breath, chest pain, joint pain, fever or chills.  No pertinent positives.    Pulse 88   Ht 1.676 m (5' 6\")   Wt 122.5 kg (270 lb)   SpO2 96%   BMI 43.58 kg/m²       Physical Exam:  Constitutional: Alert, no distress, well-groomed.  Skin: No rashes in visible areas.  Eye: Round. Conjunctiva clear, lNo icterus.   ENMT: Lips pink without lesions, good dentition, moist mucous membranes. Phonation normal.  Neck: No masses, no thyromegaly. Moves freely without pain.  CV: Pulse as reported by patient  Respiratory: Unlabored respiratory effort, no cough or audible wheeze  Psych: Alert and oriented x3, normal affect and mood.          Assessment and Plan.   50 y.o. male with the following issues.    1. Hyperuricemia  Patient's uric acid level is elevated again at 7.7 off allopurinol and although he has not had a recent gout attack, he would like to prevent getting 1 so we will start him back in allopurinol at 200 mg a day.  We will check uric acid in 3 months to decide whether to go back up to 300 mg.  - Comp Metabolic Panel; Future  - URIC ACID; Future  - allopurinol (ZYLOPRIM) 100 MG Tab; Take 2 Tabs by mouth every day.  Dispense: 180 Tab; Refill: 3    2. HTN (hypertension), benign  Blood pressure reported to be well controlled and he will continue on same medications.  - CBC WITH DIFFERENTIAL; Future  - metoprolol " (LOPRESSOR) 25 MG Tab; TAKE 1 TABLET BY MOUTH TWICE DAILY  Dispense: 180 Tab; Refill: 3    3. Pneumonia due to COVID-19 virus  Patient appears to be fully recovered from his pneumonia.    4. Morbid obesity with BMI of 40.0-44.9, adult (HCC)  Patient advised to continue weight loss.    Patient will make an appointment to come in for seasonal flu vaccine and pneumococcal 23 vaccine

## 2021-04-13 ENCOUNTER — HOSPITAL ENCOUNTER (OUTPATIENT)
Dept: LAB | Facility: MEDICAL CENTER | Age: 51
End: 2021-04-13
Attending: NURSE PRACTITIONER
Payer: COMMERCIAL

## 2021-04-13 DIAGNOSIS — E79.0 HYPERURICEMIA: ICD-10-CM

## 2021-04-13 DIAGNOSIS — R79.89 ELEVATED LFTS: ICD-10-CM

## 2021-04-13 DIAGNOSIS — I10 HTN (HYPERTENSION), BENIGN: ICD-10-CM

## 2021-04-13 LAB
ALBUMIN SERPL BCP-MCNC: 4.5 G/DL (ref 3.2–4.9)
ALBUMIN/GLOB SERPL: 1.6 G/DL
ALP SERPL-CCNC: 60 U/L (ref 30–99)
ALT SERPL-CCNC: 90 U/L (ref 2–50)
ANION GAP SERPL CALC-SCNC: 8 MMOL/L (ref 7–16)
AST SERPL-CCNC: 46 U/L (ref 12–45)
BASOPHILS # BLD AUTO: 1.1 % (ref 0–1.8)
BASOPHILS # BLD: 0.09 K/UL (ref 0–0.12)
BILIRUB CONJ SERPL-MCNC: <0.2 MG/DL (ref 0.1–0.5)
BILIRUB INDIRECT SERPL-MCNC: NORMAL MG/DL (ref 0–1)
BILIRUB SERPL-MCNC: 0.6 MG/DL (ref 0.1–1.5)
BUN SERPL-MCNC: 15 MG/DL (ref 8–22)
CALCIUM SERPL-MCNC: 9.5 MG/DL (ref 8.5–10.5)
CHLORIDE SERPL-SCNC: 100 MMOL/L (ref 96–112)
CO2 SERPL-SCNC: 26 MMOL/L (ref 20–33)
CREAT SERPL-MCNC: 0.99 MG/DL (ref 0.5–1.4)
EOSINOPHIL # BLD AUTO: 0.53 K/UL (ref 0–0.51)
EOSINOPHIL NFR BLD: 6.2 % (ref 0–6.9)
ERYTHROCYTE [DISTWIDTH] IN BLOOD BY AUTOMATED COUNT: 42.5 FL (ref 35.9–50)
GLOBULIN SER CALC-MCNC: 2.9 G/DL (ref 1.9–3.5)
GLUCOSE SERPL-MCNC: 94 MG/DL (ref 65–99)
HCT VFR BLD AUTO: 46.4 % (ref 42–52)
HGB BLD-MCNC: 15.1 G/DL (ref 14–18)
IMM GRANULOCYTES # BLD AUTO: 0.07 K/UL (ref 0–0.11)
IMM GRANULOCYTES NFR BLD AUTO: 0.8 % (ref 0–0.9)
LYMPHOCYTES # BLD AUTO: 3.28 K/UL (ref 1–4.8)
LYMPHOCYTES NFR BLD: 38.3 % (ref 22–41)
MCH RBC QN AUTO: 27.4 PG (ref 27–33)
MCHC RBC AUTO-ENTMCNC: 32.5 G/DL (ref 33.7–35.3)
MCV RBC AUTO: 84.2 FL (ref 81.4–97.8)
MONOCYTES # BLD AUTO: 0.66 K/UL (ref 0–0.85)
MONOCYTES NFR BLD AUTO: 7.7 % (ref 0–13.4)
NEUTROPHILS # BLD AUTO: 3.94 K/UL (ref 1.82–7.42)
NEUTROPHILS NFR BLD: 45.9 % (ref 44–72)
NRBC # BLD AUTO: 0 K/UL
NRBC BLD-RTO: 0 /100 WBC
PLATELET # BLD AUTO: 235 K/UL (ref 164–446)
PMV BLD AUTO: 9.7 FL (ref 9–12.9)
POTASSIUM SERPL-SCNC: 4.2 MMOL/L (ref 3.6–5.5)
PROT SERPL-MCNC: 7.4 G/DL (ref 6–8.2)
RBC # BLD AUTO: 5.51 M/UL (ref 4.7–6.1)
SODIUM SERPL-SCNC: 134 MMOL/L (ref 135–145)
URATE SERPL-MCNC: 5.6 MG/DL (ref 2.5–8.3)
WBC # BLD AUTO: 8.6 K/UL (ref 4.8–10.8)

## 2021-04-13 PROCEDURE — 82248 BILIRUBIN DIRECT: CPT

## 2021-04-13 PROCEDURE — 85025 COMPLETE CBC W/AUTO DIFF WBC: CPT

## 2021-04-13 PROCEDURE — 36415 COLL VENOUS BLD VENIPUNCTURE: CPT

## 2021-04-13 PROCEDURE — 84550 ASSAY OF BLOOD/URIC ACID: CPT

## 2021-04-13 PROCEDURE — 80053 COMPREHEN METABOLIC PANEL: CPT

## 2021-04-14 ENCOUNTER — PATIENT MESSAGE (OUTPATIENT)
Dept: MEDICAL GROUP | Facility: MEDICAL CENTER | Age: 51
End: 2021-04-14

## 2021-04-14 DIAGNOSIS — E79.0 HYPERURICEMIA: ICD-10-CM

## 2021-04-14 RX ORDER — ALLOPURINOL 100 MG/1
200 TABLET ORAL DAILY
Qty: 180 TABLET | Refills: 1 | Status: SHIPPED | OUTPATIENT
Start: 2021-04-14 | End: 2021-04-14 | Stop reason: SDUPTHER

## 2021-04-14 RX ORDER — ALLOPURINOL 100 MG/1
200 TABLET ORAL DAILY
Qty: 180 TABLET | Refills: 1 | Status: SHIPPED | OUTPATIENT
Start: 2021-04-14 | End: 2021-10-08

## 2021-04-14 NOTE — PATIENT COMMUNICATION
Received request via: Patient    Was the patient seen in the last year in this department? Yes    Does the patient have an active prescription (recently filled or refills available) for medication(s) requested? [facility].      Requested Prescriptions     Pending Prescriptions Disp Refills   • allopurinol (ZYLOPRIM) 100 MG Tab 180 tablet 1     Sig: Take 2 Tablets by mouth every day.

## 2021-04-14 NOTE — PATIENT COMMUNICATION
Received request via: Patient    Was the patient seen in the last year in this department? Yes    Does the patient have an active prescription (recently filled or refills available) for medication(s) requested? No     Requested Prescriptions     Pending Prescriptions Disp Refills   • allopurinol (ZYLOPRIM) 100 MG Tab 180 tablet 1     Sig: Take 2 Tablets by mouth every day.

## 2021-04-14 NOTE — TELEPHONE ENCOUNTER
From: Jim Neves  To: Nurse Cecelia JENSEN  Sent: 4/14/2021 12:16 PM PDT  Subject: Prescription Question    Hello, thanks for working on my refill request. I did get a message from [facility] that they have a delay because of an insurance issue (think because my contracted pharmacy for filling 90 day scripts is [facility]). Can the order go to [facility] instead of [facility]?      ----- Message -----   From:Nurse Cecelia JENSEN   Sent:4/14/2021 9:57 AM PDT   To:Jim Neves   Subject:RE: Prescription Question    Hello,  I have passed on your medication refill request to a provider for authorization.   If there are any questions or concerns, a member of our team will be in touch.  Thank you,  Cecelia Sagastume, RIrasemaN.      ----- Message -----   From:Jim Neves   Sent:4/14/2021 9:48 AM PDT   To:Nurse Practitioner Rey Baer   Subject:Prescription Question    Hello, I got a message that a refill of allopurinol could not be requested in [facility] at this time. I was wondering if you could send a refill request to [facility] (not [facility]) which I believe is for 200mg daily for a 90 day supply. I have enough for about 2 weeks so was hoping to get the request to [facility] so they have enough time to fill and mail it. Please let me know if you have any questions or need more info. Thanks.

## 2021-04-14 NOTE — TELEPHONE ENCOUNTER
From: Jim Neves  To: Nurse Practitioner Rey Baer  Sent: 4/14/2021 9:48 AM PDT  Subject: Prescription Question    Hello, I got a message that a refill of allopurinol could not be requested in [facility] at this time. I was wondering if you could send a refill request to [facility] (not [facility]) which I believe is for 200mg daily for a 90 day supply. I have enough for about 2 weeks so was hoping to get the request to [facility] so they have enough time to fill and mail it. Please let me know if you have any questions or need more info. Thanks.

## 2021-06-08 DIAGNOSIS — I10 HTN (HYPERTENSION), BENIGN: ICD-10-CM

## 2021-06-08 RX ORDER — LOSARTAN POTASSIUM 50 MG/1
TABLET ORAL
Qty: 90 TABLET | Refills: 0 | Status: SHIPPED | OUTPATIENT
Start: 2021-06-08 | End: 2021-08-23

## 2021-06-08 RX ORDER — AMLODIPINE BESYLATE 10 MG/1
TABLET ORAL
Qty: 90 TABLET | Refills: 0 | Status: SHIPPED | OUTPATIENT
Start: 2021-06-08 | End: 2021-08-23

## 2021-08-22 DIAGNOSIS — I10 HTN (HYPERTENSION), BENIGN: ICD-10-CM

## 2021-08-23 RX ORDER — AMLODIPINE BESYLATE 10 MG/1
TABLET ORAL
Qty: 90 TABLET | Refills: 0 | Status: SHIPPED | OUTPATIENT
Start: 2021-08-23

## 2021-08-23 RX ORDER — LOSARTAN POTASSIUM 50 MG/1
TABLET ORAL
Qty: 90 TABLET | Refills: 0 | Status: SHIPPED | OUTPATIENT
Start: 2021-08-23

## 2021-10-08 DIAGNOSIS — E79.0 HYPERURICEMIA: ICD-10-CM

## 2021-10-10 RX ORDER — ALLOPURINOL 100 MG/1
TABLET ORAL
Qty: 60 TABLET | Refills: 0 | Status: SHIPPED | OUTPATIENT
Start: 2021-10-10

## 2023-12-06 ENCOUNTER — NON-PROVIDER VISIT (OUTPATIENT)
Dept: OCCUPATIONAL MEDICINE | Facility: CLINIC | Age: 53
End: 2023-12-06

## 2023-12-06 DIAGNOSIS — Z71.84 TRAVEL ADVICE ENCOUNTER: ICD-10-CM

## 2023-12-06 PROCEDURE — 90471 IMMUNIZATION ADMIN: CPT | Performed by: PREVENTIVE MEDICINE

## 2023-12-06 PROCEDURE — 90691 TYPHOID VACCINE IM: CPT | Performed by: PREVENTIVE MEDICINE

## 2023-12-07 NOTE — PROGRESS NOTES
Travel dates: 12/21/23-1/5/24  Countries to be visited:  [facility]   Reason for travel: pleasure- this patient will be spending most [place] the time in [place] areas, however, they will [place] the [place] part of the [place] couple of nights.       Rural travel: yes  High altitude travel: no     Accommodations:  Hotel: yes  Hostel:  Camping:  Cruise:  With family: yes  Other:    Vaccines in past 30 days? Yes- Tdap and Hep A   Sick today? No  Allergies: Seasonal Allergies    The screening intake form was reviewed with the traveler. Health risks associated with their travel plans have been reviewed and discussed with the traveler. The traveler has been provided with vaccine information statements for the vaccines that are recommended and given the opportunity to discuss risks and benefits of vaccination and or medications. The traveler has received education on the travel itinerary provided and on the following topics.    Personal safety precautions: Yes  Food and water precautions: Yes  Management of traveler's diarrhea: Yes  Mosquito/insect bite prevention:Yes  Animal bites/Rabies prevention: No  High altitude precautions: No      RN comments:     Typhoid vaccine administered, vis given.    Malaria prophylaxis recommended. Patient is aware of risks and benefits but declines medication at this time. Pt states they will see their PCP for Rx.          Physician consultation required: no